# Patient Record
Sex: MALE | Race: WHITE | NOT HISPANIC OR LATINO | URBAN - METROPOLITAN AREA
[De-identification: names, ages, dates, MRNs, and addresses within clinical notes are randomized per-mention and may not be internally consistent; named-entity substitution may affect disease eponyms.]

---

## 2014-09-10 RX ORDER — DOCUSATE SODIUM 100 MG
2 CAPSULE ORAL
Qty: 30 | Refills: 0 | DISCHARGE
Start: 2014-09-10

## 2017-03-10 ENCOUNTER — EMERGENCY (EMERGENCY)
Facility: HOSPITAL | Age: 27
LOS: 1 days | Discharge: HOME | End: 2017-03-10
Attending: EMERGENCY MEDICINE | Admitting: EMERGENCY MEDICINE
Payer: MEDICAID

## 2017-03-10 VITALS — HEART RATE: 89 BPM | OXYGEN SATURATION: 99 %

## 2017-03-10 VITALS
HEART RATE: 103 BPM | OXYGEN SATURATION: 100 % | DIASTOLIC BLOOD PRESSURE: 48 MMHG | SYSTOLIC BLOOD PRESSURE: 148 MMHG | RESPIRATION RATE: 15 BRPM | TEMPERATURE: 98 F

## 2017-03-10 PROCEDURE — 12013 RPR F/E/E/N/L/M 2.6-5.0 CM: CPT

## 2017-03-10 PROCEDURE — 99283 EMERGENCY DEPT VISIT LOW MDM: CPT | Mod: 25

## 2017-03-10 RX ORDER — TETANUS TOXOID, REDUCED DIPHTHERIA TOXOID AND ACELLULAR PERTUSSIS VACCINE, ADSORBED 5; 2.5; 8; 8; 2.5 [IU]/.5ML; [IU]/.5ML; UG/.5ML; UG/.5ML; UG/.5ML
0.5 SUSPENSION INTRAMUSCULAR ONCE
Qty: 0 | Refills: 0 | Status: COMPLETED | OUTPATIENT
Start: 2017-03-10 | End: 2017-03-10

## 2017-03-10 RX ORDER — BENZOIN RESIN 1000 MG/ML
1 LIQUID TOPICAL ONCE
Qty: 0 | Refills: 0 | Status: DISCONTINUED | OUTPATIENT
Start: 2017-03-10 | End: 2017-03-10

## 2017-03-10 RX ADMIN — TETANUS TOXOID, REDUCED DIPHTHERIA TOXOID AND ACELLULAR PERTUSSIS VACCINE, ADSORBED 0.5 MILLILITER(S): 5; 2.5; 8; 8; 2.5 SUSPENSION INTRAMUSCULAR at 09:27

## 2017-03-10 NOTE — ED PROVIDER NOTE - PMH
Acne    Acne    Autism    Bradycardia    Constipated    Constipation    Dermatitis  with hx atopic dermatitis  Enuresis    GERD (gastroesophageal reflux disease)    Hyperlipemia    Hypothyroid    Hypothyroid    Lead poisoning    Lead poisoning    Low HDL (under 40)    Lyme disease    Lyme disease    MR (mental retardation)    Pervasive developmental disorder, active  with self injurous behavior  Pneumonia    Proctitis    Sepsis    Sinus bradycardia

## 2017-03-10 NOTE — ED PROCEDURE NOTE - CPROC ED POST PROC CARE GUIDE1
Instructed patient/caregiver regarding signs and symptoms of infection./Keep the cast/splint/dressing clean and dry./Verbal/written post procedure instructions were given to patient/caregiver.
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
## 2017-03-10 NOTE — ED ADULT TRIAGE NOTE - CHIEF COMPLAINT QUOTE
awake from [place] has hx of  MR autism and head banging  was banging head today has small abrasion to back of head and laceration to chin   baseline mental status at present

## 2017-03-10 NOTE — ED PROVIDER NOTE - OBJECTIVE STATEMENT
28 y/o M pt with PMHx of MR [facility] aides from group home to the ED for laceration on chin since today. Aide states that pt was acting out today and had "head banging episode". Pt is currently at baseline state as per aides. Denies LOC as per aide. 26 y/o M pt with PMHx of MR [facility] aides from group home to the ED for laceration on chin since today. Aide states that pt was acting out today and had "head banging episode" - consistent w/ prior agitation episodes. Pt is currently at baseline state as per aides. Denies LOC as per aide.

## 2017-03-10 NOTE — ED PROVIDER NOTE - NS ED MD SCRIBE ATTENDING SCRIBE SECTIONS
REVIEW OF SYSTEMS/DISPOSITION/HIV/PAST MEDICAL/SURGICAL/SOCIAL HISTORY/VITAL SIGNS( Pullset)/HISTORY OF PRESENT ILLNESS

## 2017-03-10 NOTE — ED PROVIDER NOTE - PROGRESS NOTE DETAILS
The scribe's documentation has been prepared under my direction and personally reviewed by me in its entirety. I confirm that the note above accurately reflects all work, treatment, procedures, and medical decicion-making performed by me.  Hansel Rogel MD

## 2017-03-13 ENCOUNTER — EMERGENCY (EMERGENCY)
Facility: HOSPITAL | Age: 27
LOS: 1 days | Discharge: HOME | End: 2017-03-13
Attending: EMERGENCY MEDICINE | Admitting: EMERGENCY MEDICINE
Payer: MEDICAID

## 2017-03-13 VITALS — HEART RATE: 89 BPM | RESPIRATION RATE: 16 BRPM | OXYGEN SATURATION: 100 %

## 2017-03-13 PROCEDURE — 99282 EMERGENCY DEPT VISIT SF MDM: CPT

## 2017-03-13 NOTE — ED PROVIDER NOTE - OBJECTIVE STATEMENT
26yo M with PMHX of acne, autism, constipation, dermatitis, enuresis, GERD, HLD, hypothyroid, lead poisoning, low HDL, lyme disease, MR, developmental disorder, PNA, proctitis, sepsis, sinus bradycardia, from group facility, presents to ED for wound check. Per Aids: pt had x5 vicryl absorbable sutures placed x3d ago at [facility] ED for chin lac. Per aids, no redness, no pus. Pt at baseline.

## 2017-03-13 NOTE — ED PROVIDER NOTE - NS ED MD SCRIBE ATTENDING SCRIBE SECTIONS
REVIEW OF SYSTEMS/HISTORY OF PRESENT ILLNESS/PHYSICAL EXAM/HIV/PAST MEDICAL/SURGICAL/SOCIAL HISTORY/DISPOSITION/VITAL SIGNS( Pullset)

## 2017-05-17 ENCOUNTER — EMERGENCY (EMERGENCY)
Facility: HOSPITAL | Age: 27
LOS: 0 days | Discharge: HOME | End: 2017-05-17
Attending: EMERGENCY MEDICINE
Payer: MEDICAID

## 2017-05-17 VITALS
HEART RATE: 100 BPM | WEIGHT: 139.99 LBS | DIASTOLIC BLOOD PRESSURE: 97 MMHG | TEMPERATURE: 98 F | OXYGEN SATURATION: 100 % | SYSTOLIC BLOOD PRESSURE: 126 MMHG | HEIGHT: 65 IN | RESPIRATION RATE: 16 BRPM
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
## 2017-05-17 DIAGNOSIS — E73.9 LACTOSE INTOLERANCE, UNSPECIFIED: ICD-10-CM

## 2017-05-17 DIAGNOSIS — Z23 ENCOUNTER FOR IMMUNIZATION: ICD-10-CM

## 2017-05-17 DIAGNOSIS — S91.311A LACERATION WITHOUT FOREIGN BODY, RIGHT FOOT, INITIAL ENCOUNTER: ICD-10-CM
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
## 2017-05-17 DIAGNOSIS — M79.671 PAIN IN RIGHT FOOT: ICD-10-CM

## 2017-05-17 PROCEDURE — 99283 EMERGENCY DEPT VISIT LOW MDM: CPT

## 2017-05-17 RX ORDER — TETANUS TOXOID, REDUCED DIPHTHERIA TOXOID AND ACELLULAR PERTUSSIS VACCINE, ADSORBED 5; 2.5; 8; 8; 2.5 [IU]/.5ML; [IU]/.5ML; UG/.5ML; UG/.5ML; UG/.5ML
0.5 SUSPENSION INTRAMUSCULAR ONCE
Qty: 0 | Refills: 0 | Status: COMPLETED | OUTPATIENT
Start: 2017-05-17 | End: 2017-05-17

## 2017-05-17 RX ADMIN — TETANUS TOXOID, REDUCED DIPHTHERIA TOXOID AND ACELLULAR PERTUSSIS VACCINE, ADSORBED 0.5 MILLILITER(S): 5; 2.5; 8; 8; 2.5 SUSPENSION INTRAMUSCULAR at 19:03
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
## 2017-08-22 ENCOUNTER — EMERGENCY (EMERGENCY)
Facility: HOSPITAL | Age: 27
LOS: 1 days | Discharge: HOME | End: 2017-08-22
Admitting: EMERGENCY MEDICINE
Payer: COMMERCIAL

## 2017-08-22 VITALS
TEMPERATURE: 98 F | DIASTOLIC BLOOD PRESSURE: 75 MMHG | SYSTOLIC BLOOD PRESSURE: 125 MMHG | OXYGEN SATURATION: 96 % | HEART RATE: 57 BPM | RESPIRATION RATE: 16 BRPM

## 2017-08-22 PROCEDURE — 99283 EMERGENCY DEPT VISIT LOW MDM: CPT
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
## 2017-08-22 NOTE — ED PROVIDER NOTE - NS_EDPROVIDERDISPOUSERTYPE_ED_A_ED
Scribe Attestation (For Scribes USE Only)... Scribe Attestation (For Scribes USE Only).../I have personally evaluated and examined the patient. The Attending was available to me as a supervising provider if needed.

## 2018-01-07 ENCOUNTER — EMERGENCY (EMERGENCY)
Facility: HOSPITAL | Age: 28
LOS: 1 days | Discharge: HOME | End: 2018-01-07
Attending: EMERGENCY MEDICINE | Admitting: EMERGENCY MEDICINE
Payer: MEDICAID

## 2018-01-07 VITALS
RESPIRATION RATE: 17 BRPM | DIASTOLIC BLOOD PRESSURE: 86 MMHG | HEART RATE: 61 BPM | SYSTOLIC BLOOD PRESSURE: 128 MMHG | OXYGEN SATURATION: 97 % | TEMPERATURE: 98 F

## 2018-01-07 PROCEDURE — 71046 X-RAY EXAM CHEST 2 VIEWS: CPT | Mod: 26

## 2018-01-07 PROCEDURE — 99283 EMERGENCY DEPT VISIT LOW MDM: CPT

## 2018-01-07 NOTE — ED PROVIDER NOTE - OBJECTIVE STATEMENT
Pt is a 28 y/o M w/ autism, who presents to the ED with cough, and rhinorrhea since yesterday. He lives at a group home and as precaution he was brought in today by care takers. They deny any fever, or changes in behavior.

## 2018-01-07 NOTE — ED PROVIDER NOTE - CARE PLAN
Principal Discharge DX:	Cough Principal Discharge DX:	Cough  Instructions for follow-up, activity and diet:	See your primary care doctor within 24-48 hours, bring copies of all reports with you. Take Tylenol as prescribed for fever. Return to the ER for worsening symptoms or any other concerns.

## 2018-01-07 NOTE — ED PROVIDER NOTE - PROGRESS NOTE DETAILS
KAT Prieto: Received sign out from KAT RAE to reassess patient and follow up on CXR results. CXR wnl, will dc back to group home.

## 2018-01-07 NOTE — ED PROVIDER NOTE - PLAN OF CARE
See your primary care doctor within 24-48 hours, bring copies of all reports with you. Take Tylenol as prescribed for fever. Return to the ER for worsening symptoms or any other concerns.

## 2018-02-20 ENCOUNTER — EMERGENCY (EMERGENCY)
Facility: HOSPITAL | Age: 28
LOS: 1 days | Discharge: HOME | End: 2018-02-20
Attending: EMERGENCY MEDICINE | Admitting: EMERGENCY MEDICINE
Payer: MEDICAID

## 2018-02-20 ENCOUNTER — TRANSCRIPTION ENCOUNTER (OUTPATIENT)
Age: 28
End: 2018-02-20

## 2018-02-20 VITALS
TEMPERATURE: 99 F | DIASTOLIC BLOOD PRESSURE: 82 MMHG | RESPIRATION RATE: 16 BRPM | SYSTOLIC BLOOD PRESSURE: 137 MMHG | HEART RATE: 89 BPM | OXYGEN SATURATION: 99 %

## 2018-02-20 PROCEDURE — 99283 EMERGENCY DEPT VISIT LOW MDM: CPT
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
## 2018-02-20 NOTE — ED PROVIDER NOTE - OBJECTIVE STATEMENT
28 yo male bib aides, 28 yo male bib aides, had fever 101 earlier today, but now afebrile, no complaints, wanted rapid flu per group home.  denies nvd, no cough no abd pain cp sob

## 2018-02-20 NOTE — ED ADULT TRIAGE NOTE - CHIEF COMPLAINT QUOTE
pt with fever this morning 101 at group home, did not eat his breakfast otherwise acting his normal self as per group home staff.  Pt went to Urgent Care and was sent to ED because he could not give urine sample.  Pt stating that he had to urinate during triage, given cup.  PMH: autism, lead poisoning, MR, bradycardia

## 2018-02-20 NOTE — ED PROVIDER NOTE - MEDICAL DECISION MAKING DETAILS
spoke w dr hess, pmd, has appt this afternoon, discussed unremarkable exam, she accepts pt for workup in her office, as has rapid flu there, and return to ER if worse

## 2018-05-11 ENCOUNTER — EMERGENCY (EMERGENCY)
Facility: HOSPITAL | Age: 28
LOS: 1 days | Discharge: HOME | End: 2018-05-11
Attending: EMERGENCY MEDICINE | Admitting: EMERGENCY MEDICINE
Payer: MEDICAID

## 2018-05-11 VITALS
OXYGEN SATURATION: 97 % | SYSTOLIC BLOOD PRESSURE: 138 MMHG | HEART RATE: 82 BPM | TEMPERATURE: 98 F | DIASTOLIC BLOOD PRESSURE: 74 MMHG | RESPIRATION RATE: 18 BRPM

## 2018-05-11 PROCEDURE — 12011 RPR F/E/E/N/L/M 2.5 CM/<: CPT

## 2018-05-11 PROCEDURE — 99282 EMERGENCY DEPT VISIT SF MDM: CPT | Mod: 25

## 2018-05-11 NOTE — ED PROVIDER NOTE - PHYSICAL EXAMINATION
ATTENDING PHYSICAL EXAM DR. WASHINGTON ***GEN - NAD; well appearing; A+O x3 ***HEAD - NC/AT ***EYES/NOSE - PERRL, EOMI, mucous membranes moist, no discharge ***THROAT: Oral cavity and pharynx normal. No inflammation, swelling, exudate, or lesions.  ***NECK: Neck supple, non-tender without lymphadenopathy, no masses, no thyromegaly.   ***PULMONARY - CTA b/l, symmetric breath sounds. ***CARDIAC -s1s2, RRR, no M,G,R  ***ABDOMEN - +BS, ND, NT, soft, no guarding, no rebound, no masses   ***BACK - no CVA tenderness, Normal  spine ***EXTREMITIES - symmetric pulses, 2+ dp, capillary refill < 2 seconds, no clubbing, no cyanosis, no edema ***SKIN - no rash or bruising   ***NEUROLOGIC - alert, CN 2-12 intact, reflexes nl, sensation nl, coordination nl, finger to nose nl, moving all 4 ext, no pronator drift, gait nl

## 2018-05-11 NOTE — ED ADULT TRIAGE NOTE - CHIEF COMPLAINT QUOTE
Pt brought from Group home, accompanied by staff for slip & fall in shower. + head injury, apprx 1 cm lac observed to R forehead with minimal bleeding. No LOC. Hx: MR, Autism. Pt cooperative in triage, follows commands. No other complaints at this time.
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
## 2018-06-20 ENCOUNTER — OUTPATIENT (OUTPATIENT)
Dept: OUTPATIENT SERVICES | Facility: HOSPITAL | Age: 28
LOS: 1 days | End: 2018-06-20
Payer: MEDICAID

## 2018-06-20 VITALS
SYSTOLIC BLOOD PRESSURE: 114 MMHG | RESPIRATION RATE: 16 BRPM | DIASTOLIC BLOOD PRESSURE: 76 MMHG | TEMPERATURE: 97 F | WEIGHT: 164.91 LBS | OXYGEN SATURATION: 96 % | HEIGHT: 64.5 IN | HEART RATE: 71 BPM

## 2018-06-20 DIAGNOSIS — K05.6 PERIODONTAL DISEASE, UNSPECIFIED: ICD-10-CM

## 2018-06-20 DIAGNOSIS — K02.62 DENTAL CARIES ON SMOOTH SURFACE PENETRATING INTO DENTIN: ICD-10-CM

## 2018-06-20 DIAGNOSIS — Z01.818 ENCOUNTER FOR OTHER PREPROCEDURAL EXAMINATION: ICD-10-CM

## 2018-06-20 DIAGNOSIS — Z01.20 ENCOUNTER FOR DENTAL EXAMINATION AND CLEANING WITHOUT ABNORMAL FINDINGS: ICD-10-CM

## 2018-06-20 DIAGNOSIS — G40.909 EPILEPSY, UNSPECIFIED, NOT INTRACTABLE, WITHOUT STATUS EPILEPTICUS: ICD-10-CM

## 2018-06-20 LAB
ANION GAP SERPL CALC-SCNC: 14 MMOL/L — SIGNIFICANT CHANGE UP (ref 5–17)
BUN SERPL-MCNC: 13 MG/DL — SIGNIFICANT CHANGE UP (ref 7–23)
CALCIUM SERPL-MCNC: 9 MG/DL — SIGNIFICANT CHANGE UP (ref 8.4–10.5)
CHLORIDE SERPL-SCNC: 101 MMOL/L — SIGNIFICANT CHANGE UP (ref 96–108)
CO2 SERPL-SCNC: 24 MMOL/L — SIGNIFICANT CHANGE UP (ref 22–31)
CREAT SERPL-MCNC: 0.76 MG/DL — SIGNIFICANT CHANGE UP (ref 0.5–1.3)
GLUCOSE SERPL-MCNC: 90 MG/DL — SIGNIFICANT CHANGE UP (ref 70–99)
HCT VFR BLD CALC: 41.4 % — SIGNIFICANT CHANGE UP (ref 39–50)
HGB BLD-MCNC: 14.4 G/DL — SIGNIFICANT CHANGE UP (ref 13–17)
MCHC RBC-ENTMCNC: 31.6 PG — SIGNIFICANT CHANGE UP (ref 27–34)
MCHC RBC-ENTMCNC: 34.8 GM/DL — SIGNIFICANT CHANGE UP (ref 32–36)
MCV RBC AUTO: 91 FL — SIGNIFICANT CHANGE UP (ref 80–100)
PLATELET # BLD AUTO: 221 K/UL — SIGNIFICANT CHANGE UP (ref 150–400)
POTASSIUM SERPL-MCNC: 4.2 MMOL/L — SIGNIFICANT CHANGE UP (ref 3.5–5.3)
POTASSIUM SERPL-SCNC: 4.2 MMOL/L — SIGNIFICANT CHANGE UP (ref 3.5–5.3)
RBC # BLD: 4.55 M/UL — SIGNIFICANT CHANGE UP (ref 4.2–5.8)
RBC # FLD: 12.9 % — SIGNIFICANT CHANGE UP (ref 10.3–14.5)
SODIUM SERPL-SCNC: 139 MMOL/L — SIGNIFICANT CHANGE UP (ref 135–145)
WBC # BLD: 8.69 K/UL — SIGNIFICANT CHANGE UP (ref 3.8–10.5)
WBC # FLD AUTO: 8.69 K/UL — SIGNIFICANT CHANGE UP (ref 3.8–10.5)

## 2018-06-20 PROCEDURE — 85027 COMPLETE CBC AUTOMATED: CPT

## 2018-06-20 PROCEDURE — 80048 BASIC METABOLIC PNL TOTAL CA: CPT

## 2018-06-20 PROCEDURE — G0463: CPT

## 2018-06-20 RX ORDER — OXCARBAZEPINE 300 MG/1
1 TABLET, FILM COATED ORAL
Qty: 0 | Refills: 0 | COMMUNITY
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
## 2018-06-20 NOTE — H&P PST ADULT - PMH
Acne    Constipation    Dermatitis  with hx atopic dermatitis  Enuresis    Hypothyroid    Lead poisoning    Low HDL (under 40)    Lyme disease    MR (mental retardation)    Pervasive developmental disorder, active  with self injurous behavior  Proctitis    Sinus bradycardia Acne    Acne    Autism    Bradycardia    Constipated    Constipation    Dermatitis  with hx atopic dermatitis  Enuresis    GERD (gastroesophageal reflux disease)    Hyperlipemia    Hypothyroid    Hypothyroid    Lead poisoning    Lead poisoning    Low HDL (under 40)    Lyme disease    Lyme disease    Mental retardation  severe  Pervasive developmental disorder, active  with self injurous behavior  Pneumonia    Proctitis    Sepsis    Sinus bradycardia Autism    Constipation    Enuresis    GERD (gastroesophageal reflux disease)    Hyperlipemia    Hypothyroid    Lead poisoning    Low HDL (under 40)    Lyme disease    Mental retardation  severe  Pervasive developmental disorder, active  with self injurous behavior  Seizure disorder

## 2018-07-10 ENCOUNTER — TRANSCRIPTION ENCOUNTER (OUTPATIENT)
Age: 28
End: 2018-07-10

## 2018-07-11 ENCOUNTER — OUTPATIENT (OUTPATIENT)
Dept: OUTPATIENT SERVICES | Facility: HOSPITAL | Age: 28
LOS: 1 days | End: 2018-07-11
Payer: MEDICAID

## 2018-07-11 VITALS
WEIGHT: 164.91 LBS | RESPIRATION RATE: 18 BRPM | HEART RATE: 73 BPM | TEMPERATURE: 98 F | HEIGHT: 64.5 IN | OXYGEN SATURATION: 97 % | SYSTOLIC BLOOD PRESSURE: 125 MMHG | DIASTOLIC BLOOD PRESSURE: 78 MMHG

## 2018-07-11 VITALS
RESPIRATION RATE: 24 BRPM | SYSTOLIC BLOOD PRESSURE: 126 MMHG | DIASTOLIC BLOOD PRESSURE: 76 MMHG | HEART RATE: 87 BPM | TEMPERATURE: 98 F | OXYGEN SATURATION: 97 %

## 2018-07-11 DIAGNOSIS — K05.6 PERIODONTAL DISEASE, UNSPECIFIED: ICD-10-CM

## 2018-07-11 DIAGNOSIS — Z01.818 ENCOUNTER FOR OTHER PREPROCEDURAL EXAMINATION: ICD-10-CM

## 2018-07-11 DIAGNOSIS — K02.62 DENTAL CARIES ON SMOOTH SURFACE PENETRATING INTO DENTIN: ICD-10-CM

## 2018-07-11 PROCEDURE — D2161: CPT

## 2018-07-11 PROCEDURE — D7140: CPT

## 2018-07-11 PROCEDURE — C1889: CPT

## 2018-07-11 PROCEDURE — D2140: CPT

## 2018-07-11 PROCEDURE — D2391: CPT

## 2018-07-11 RX ORDER — SODIUM CHLORIDE 9 MG/ML
1000 INJECTION, SOLUTION INTRAVENOUS
Qty: 0 | Refills: 0 | Status: DISCONTINUED | OUTPATIENT
Start: 2018-07-11 | End: 2018-07-26

## 2018-07-11 RX ORDER — ACETAMINOPHEN 500 MG
1000 TABLET ORAL ONCE
Qty: 0 | Refills: 0 | Status: DISCONTINUED | OUTPATIENT
Start: 2018-07-11 | End: 2018-07-26

## 2018-07-11 RX ORDER — ONDANSETRON 8 MG/1
4 TABLET, FILM COATED ORAL ONCE
Qty: 0 | Refills: 0 | Status: DISCONTINUED | OUTPATIENT
Start: 2018-07-11 | End: 2018-07-26

## 2018-07-11 NOTE — ASU PATIENT PROFILE, ADULT - PMH
Autism    Constipation    Enuresis    GERD (gastroesophageal reflux disease)    Hyperlipemia    Hypothyroid    Lead poisoning    Low HDL (under 40)    Lyme disease    Mental retardation  severe  Pervasive developmental disorder, active  with self injurous behavior  Seizure disorder

## 2018-07-11 NOTE — BRIEF OPERATIVE NOTE - PROCEDURE
<<-----Click on this checkbox to enter Procedure Dental exam, with x-ray imaging, dental cleaning, and restoration  07/11/2018    Active  MONICA

## 2018-07-11 NOTE — ASU DISCHARGE PLAN (ADULT/PEDIATRIC). - INSTRUCTIONS
post op instructions were given, one extraction performed to the upper left side, no straw or spitting for two days, see Dr Day in one week [id number], patient may return to program on 7/13/18

## 2018-07-17 ENCOUNTER — EMERGENCY (EMERGENCY)
Facility: HOSPITAL | Age: 28
LOS: 1 days | Discharge: HOME | End: 2018-07-17
Attending: EMERGENCY MEDICINE | Admitting: EMERGENCY MEDICINE
Payer: MEDICAID

## 2018-07-17 VITALS
TEMPERATURE: 98 F | OXYGEN SATURATION: 99 % | SYSTOLIC BLOOD PRESSURE: 133 MMHG | DIASTOLIC BLOOD PRESSURE: 92 MMHG | HEART RATE: 84 BPM | RESPIRATION RATE: 18 BRPM

## 2018-07-17 PROBLEM — F79 UNSPECIFIED INTELLECTUAL DISABILITIES: Chronic | Status: ACTIVE | Noted: 2018-06-20

## 2018-07-17 PROCEDURE — 70450 CT HEAD/BRAIN W/O DYE: CPT | Mod: 26

## 2018-07-17 PROCEDURE — 99284 EMERGENCY DEPT VISIT MOD MDM: CPT
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
## 2018-07-17 NOTE — ED PROVIDER NOTE - ENMT, MLM
Airway patent, Nasal mucosa clear. Mouth with normal mucosa. Throat has no vesicles, no oropharyngeal exudates and uvula is midline. No hemotympanum bilaterally. No bustos's sign. No raccoon eyes. Head soft tissue swelling over right temporal area. No deformity or step off. No apparent tenderness

## 2018-07-17 NOTE — ED PROVIDER NOTE - PSYCHIATRIC, MLM
Alert and oriented to person, place, time/situation. normal mood and affect. no apparent risk to self or others. Calm and cooperative

## 2018-07-17 NOTE — ED PROVIDER NOTE - MEDICAL DECISION MAKING DETAILS
Pt with soft tissue swelling over temporal area after minor head trauma. Perform CT head and reassess
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
## 2018-07-17 NOTE — ED PROVIDER NOTE - MUSCULOSKELETAL, MLM
Spine appears normal, range of motion is not limited, no muscle or joint tenderness. No deformity. Moving all extremities. Ambulatory with steady gait

## 2018-07-24 ENCOUNTER — EMERGENCY (EMERGENCY)
Facility: HOSPITAL | Age: 28
LOS: 0 days | Discharge: HOME | End: 2018-07-24
Attending: STUDENT IN AN ORGANIZED HEALTH CARE EDUCATION/TRAINING PROGRAM
Payer: MEDICAID

## 2018-07-24 VITALS
DIASTOLIC BLOOD PRESSURE: 83 MMHG | HEART RATE: 97 BPM | TEMPERATURE: 99 F | RESPIRATION RATE: 20 BRPM | SYSTOLIC BLOOD PRESSURE: 136 MMHG | OXYGEN SATURATION: 99 %

## 2018-07-24 DIAGNOSIS — M25.571 PAIN IN RIGHT ANKLE AND JOINTS OF RIGHT FOOT: ICD-10-CM
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
## 2018-07-24 DIAGNOSIS — G40.909 EPILEPSY, UNSPECIFIED, NOT INTRACTABLE, WITHOUT STATUS EPILEPTICUS: ICD-10-CM
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
## 2018-07-24 DIAGNOSIS — E78.5 HYPERLIPIDEMIA, UNSPECIFIED: ICD-10-CM

## 2018-07-24 DIAGNOSIS — F84.0 AUTISTIC DISORDER: ICD-10-CM

## 2018-07-24 DIAGNOSIS — K59.00 CONSTIPATION, UNSPECIFIED: ICD-10-CM
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
## 2018-07-24 DIAGNOSIS — F79 UNSPECIFIED INTELLECTUAL DISABILITIES: ICD-10-CM

## 2018-07-24 DIAGNOSIS — F84.9 PERVASIVE DEVELOPMENTAL DISORDER, UNSPECIFIED: ICD-10-CM

## 2018-07-24 PROCEDURE — 99283 EMERGENCY DEPT VISIT LOW MDM: CPT | Mod: 25

## 2018-07-24 PROCEDURE — 29515 APPLICATION SHORT LEG SPLINT: CPT

## 2018-07-24 PROCEDURE — 73610 X-RAY EXAM OF ANKLE: CPT | Mod: 26,RT
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
## 2018-07-24 NOTE — ED PROVIDER NOTE - MEDICAL DECISION MAKING DETAILS
rest, ice, otc pain meds and orthopaedic f/u in 1 week   Patient discharge to 2 staff members at group home

## 2018-07-24 NOTE — ED ADULT TRIAGE NOTE - CHIEF COMPLAINT QUOTE
pt complaining of right ankle pain and swelling. as per group home rep pt fell this morning.  denies head injury. history of mental retardation.

## 2018-07-24 NOTE — ED PROVIDER NOTE - ATTENDING CONTRIBUTION TO CARE
I have personally seen and evaluated this patient and agree with the PA note, exam and plan of care    28 year old male presented today from a group home accompanied with a staff member for evaluation for ankle swelling and tenderness s/p fall, on exam pt has lateral ankle soft tissue swelling with decreased rom due to the pain, xray is neg for acute fracture, ace wrap applied, home care instructions provided, referred to ortho

## 2018-08-01 ENCOUNTER — EMERGENCY (EMERGENCY)
Facility: HOSPITAL | Age: 28
LOS: 1 days | Discharge: NURSING, REHAB OR LONG-TERM CARE | End: 2018-08-01
Attending: EMERGENCY MEDICINE | Admitting: EMERGENCY MEDICINE
Payer: MEDICAID

## 2018-08-01 ENCOUNTER — APPOINTMENT (OUTPATIENT)
Dept: ORTHOPEDIC SURGERY | Facility: HOSPITAL | Age: 28
End: 2018-08-01

## 2018-08-01 VITALS
DIASTOLIC BLOOD PRESSURE: 93 MMHG | OXYGEN SATURATION: 99 % | SYSTOLIC BLOOD PRESSURE: 132 MMHG | TEMPERATURE: 98 F | RESPIRATION RATE: 18 BRPM | HEART RATE: 88 BPM

## 2018-08-01 PROCEDURE — 99282 EMERGENCY DEPT VISIT SF MDM: CPT

## 2018-08-01 NOTE — ED PROVIDER NOTE - PHYSICAL EXAMINATION
MSK: full active ROM of LE and ankle, pt able to ambulate independently, some bruising of L lateral ankle

## 2018-08-01 NOTE — ED PROVIDER NOTE - OBJECTIVE STATEMENT
27 YO M presents from group home for f/u of foot injury. Three aides bedside contribute to HPI. Pt seen a week ago in [facility] ED and given a posterior splint and informed to f/u with ortho. Patient saw ortho. Per aides they state they were informed to f/u in the ED again in a week. Pt denies any acute complaints and pain. 27 YO M presents from group home for f/u of foot injury. Three aides bedside contribute to HPI. Pt seen a week ago in ED and given a posterior splint and informed to f/u with ortho. Patient saw ortho. Per aides they state they were informed to f/u in the ED again in a week. Pt denies any acute complaints and pain.

## 2018-08-01 NOTE — ED PROVIDER NOTE - ATTENDING CONTRIBUTION TO CARE
Laz: 29 yo male with a h/o MR presents to the ED for f/u of right ankle sprain. Pt already seen as an outpatient by ortho. Since then pt has been ambulatory and not complaining of pain. Staff confused as to why they came back tot he ED- likely meant to f/u with outpatient ortho. GENERAL: well appearing, NAD, HEENT: MMM, CARDIO: +S1/S2, no murmurs, rubs or gallops, LUNGS: CTA B/L, no wheezing, rales or rhonchi,  EXT: mild ecchymosis of lateral aspect of right ankle, no bony TTP, pt ambulatory.,, 2+ distal pulses x 4 extremities. NEURO: follows commands and answers basic questions SKIN: ecchymosis as noted above. A/P- 29 yo male with ankle sprain, healing well. xrays reviewed, no signs of fracture and patient clinically improving. will d/c to f/u with ortho.

## 2018-08-01 NOTE — ED PROVIDER NOTE - MEDICAL DECISION MAKING DETAILS
29 YO M presents for f/u of ankle sprain. Pt able to ambulate independently, does not appear in acute distress, and denies pain. Will inform to continue ortho f/u.

## 2018-08-05 ENCOUNTER — EMERGENCY (EMERGENCY)
Facility: HOSPITAL | Age: 28
LOS: 0 days | Discharge: HOME | End: 2018-08-05
Attending: EMERGENCY MEDICINE
Payer: MEDICAID

## 2018-08-05 VITALS
HEIGHT: 64 IN | SYSTOLIC BLOOD PRESSURE: 135 MMHG | DIASTOLIC BLOOD PRESSURE: 92 MMHG | RESPIRATION RATE: 17 BRPM | OXYGEN SATURATION: 100 % | HEART RATE: 83 BPM | TEMPERATURE: 98 F | WEIGHT: 139.99 LBS

## 2018-08-05 DIAGNOSIS — Z04.1 ENCOUNTER FOR EXAMINATION AND OBSERVATION FOLLOWING TRANSPORT ACCIDENT: ICD-10-CM

## 2018-08-05 DIAGNOSIS — F79 UNSPECIFIED INTELLECTUAL DISABILITIES: ICD-10-CM

## 2018-08-05 DIAGNOSIS — K59.00 CONSTIPATION, UNSPECIFIED: ICD-10-CM

## 2018-08-05 DIAGNOSIS — E78.5 HYPERLIPIDEMIA, UNSPECIFIED: ICD-10-CM

## 2018-08-05 DIAGNOSIS — F84.0 AUTISTIC DISORDER: ICD-10-CM

## 2018-08-05 DIAGNOSIS — A69.20 LYME DISEASE, UNSPECIFIED: ICD-10-CM

## 2018-08-05 DIAGNOSIS — E03.9 HYPOTHYROIDISM, UNSPECIFIED: ICD-10-CM

## 2018-08-05 PROCEDURE — 99283 EMERGENCY DEPT VISIT LOW MDM: CPT
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
## 2018-08-05 NOTE — ED ADULT NURSE NOTE - NSIMPLEMENTINTERV_GEN_ALL_ED
Implemented All Universal Safety Interventions:  [place] to call system. Call bell, personal items and telephone within reach. Instruct patient to call for assistance. Room bathroom lighting operational. Non-slip footwear when patient is off stretcher. Physically safe environment: no spills, clutter or unnecessary equipment. Stretcher in lowest position, wheels locked, appropriate side rails in place.
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
## 2018-08-08 ENCOUNTER — EMERGENCY (EMERGENCY)
Facility: HOSPITAL | Age: 28
LOS: 1 days | Discharge: HOME | End: 2018-08-08
Admitting: EMERGENCY MEDICINE
Payer: MEDICAID

## 2018-08-08 VITALS
HEART RATE: 60 BPM | SYSTOLIC BLOOD PRESSURE: 136 MMHG | OXYGEN SATURATION: 99 % | DIASTOLIC BLOOD PRESSURE: 54 MMHG | TEMPERATURE: 98 F | RESPIRATION RATE: 16 BRPM

## 2018-08-08 PROCEDURE — 99282 EMERGENCY DEPT VISIT SF MDM: CPT

## 2018-08-08 RX ORDER — TETANUS TOXOID, REDUCED DIPHTHERIA TOXOID AND ACELLULAR PERTUSSIS VACCINE, ADSORBED 5; 2.5; 8; 8; 2.5 [IU]/.5ML; [IU]/.5ML; UG/.5ML; UG/.5ML; UG/.5ML
0.5 SUSPENSION INTRAMUSCULAR ONCE
Qty: 0 | Refills: 0 | Status: COMPLETED | OUTPATIENT
Start: 2018-08-08 | End: 2018-08-08

## 2018-08-08 RX ADMIN — TETANUS TOXOID, REDUCED DIPHTHERIA TOXOID AND ACELLULAR PERTUSSIS VACCINE, ADSORBED 0.5 MILLILITER(S): 5; 2.5; 8; 8; 2.5 SUSPENSION INTRAMUSCULAR at 15:01

## 2018-08-08 NOTE — ED PROVIDER NOTE - PROGRESS NOTE DETAILS
RENETTA Roberts- cleaned wound with alcohol. Applied bacitracin and covered with band-aid. Wound care discussed- clean with soap and water daily, apply bacitracin and cover for 1 week- written on group home paper work.  Tdap administered. Head precautions/ strict return to ED discussed with patient and staff.

## 2018-08-08 NOTE — ED PROVIDER NOTE - PLAN OF CARE
Follow up with your PMD within 24-48 hrs hours.  Rest, Take Tylenol 650mg every 4-6 hours as needed for pain . You may have a headache associated with nausea and lightheadedness in the next few hours/days. This is called a concussion and does not warrant return to the Emergency department unless you develop significant worsening of pain, profuse vomiting, dizziness, changes in vision, difficulty walking/speaking, weakness or numbness to your extremities. Worsening or new concerning symptoms return to the emergency department. Follow up with your PMD within 24-48 hrs hours.  Rest, Take Tylenol 650mg every 4-6 hours as needed for pain . You may have a headache associated with nausea and lightheadedness in the next few hours/days. This is called a concussion and does not warrant return to the Emergency department unless you develop significant worsening of pain, profuse vomiting, dizziness, changes in vision, difficulty walking/speaking, weakness or numbness to your extremities. Wound care as discussed. Worsening or new concerning symptoms return to the emergency department. Tdap (tetanus shot) administered today.
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
## 2018-08-08 NOTE — ED PROVIDER NOTE - CARE PLAN
Principal Discharge DX:	Traumatic injury of head, initial encounter  Assessment and plan of treatment:	Follow up with your PMD within 24-48 hrs hours.  Rest, Take Tylenol 650mg every 4-6 hours as needed for pain . You may have a headache associated with nausea and lightheadedness in the next few hours/days. This is called a concussion and does not warrant return to the Emergency department unless you develop significant worsening of pain, profuse vomiting, dizziness, changes in vision, difficulty walking/speaking, weakness or numbness to your extremities. Worsening or new concerning symptoms return to the emergency department.  Secondary Diagnosis:	Abrasion Principal Discharge DX:	Traumatic injury of head, initial encounter  Assessment and plan of treatment:	Follow up with your PMD within 24-48 hrs hours.  Rest, Take Tylenol 650mg every 4-6 hours as needed for pain . You may have a headache associated with nausea and lightheadedness in the next few hours/days. This is called a concussion and does not warrant return to the Emergency department unless you develop significant worsening of pain, profuse vomiting, dizziness, changes in vision, difficulty walking/speaking, weakness or numbness to your extremities. Wound care as discussed. Worsening or new concerning symptoms return to the emergency department. Tdap (tetanus shot) administered today.  Secondary Diagnosis:	Abrasion
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
## 2018-08-08 NOTE — ED PROVIDER NOTE - MEDICAL DECISION MAKING DETAILS
27 y/o m with h/o MR presenting with head injury and abrasion- no LOC, no present neuro deficits- wound care, up date Tdap, strict head return precautions discussed with patient and staff

## 2018-08-24 NOTE — ED PROVIDER NOTE - CPE EDP RESP NORM
Info to pt, advised to monitor blood pressure and contact the office with a follow up. Reminder sets   normal...

## 2018-09-08 PROBLEM — S92.023A: Status: ACTIVE | Noted: 2018-09-08

## 2018-09-12 ENCOUNTER — APPOINTMENT (OUTPATIENT)
Dept: ORTHOPEDIC SURGERY | Facility: HOSPITAL | Age: 28
End: 2018-09-12

## 2018-09-12 DIAGNOSIS — S92.023A: ICD-10-CM

## 2018-11-27 ENCOUNTER — TRANSCRIPTION ENCOUNTER (OUTPATIENT)
Age: 28
End: 2018-11-27

## 2019-01-01 ENCOUNTER — EMERGENCY (EMERGENCY)
Facility: HOSPITAL | Age: 29
LOS: 1 days | Discharge: HOME | End: 2019-01-01
Attending: EMERGENCY MEDICINE | Admitting: EMERGENCY MEDICINE
Payer: MEDICAID

## 2019-01-01 VITALS
DIASTOLIC BLOOD PRESSURE: 94 MMHG | RESPIRATION RATE: 17 BRPM | OXYGEN SATURATION: 96 % | SYSTOLIC BLOOD PRESSURE: 136 MMHG | TEMPERATURE: 98 F | HEART RATE: 104 BPM

## 2019-01-01 PROCEDURE — 99285 EMERGENCY DEPT VISIT HI MDM: CPT | Mod: 25

## 2019-01-01 PROCEDURE — 70450 CT HEAD/BRAIN W/O DYE: CPT | Mod: 26

## 2019-01-01 RX ORDER — CEPHALEXIN 500 MG
500 CAPSULE ORAL ONCE
Qty: 0 | Refills: 0 | Status: COMPLETED | OUTPATIENT
Start: 2019-01-01 | End: 2019-01-01

## 2019-01-01 RX ORDER — CEPHALEXIN 500 MG
1 CAPSULE ORAL
Qty: 14 | Refills: 0
Start: 2019-01-01 | End: 2019-01-07

## 2019-01-01 RX ORDER — DIPHENHYDRAMINE HCL 50 MG
50 CAPSULE ORAL ONCE
Qty: 0 | Refills: 0 | Status: COMPLETED | OUTPATIENT
Start: 2019-01-01 | End: 2019-01-01

## 2019-01-01 RX ORDER — HALOPERIDOL DECANOATE 100 MG/ML
5 INJECTION INTRAMUSCULAR ONCE
Qty: 0 | Refills: 0 | Status: COMPLETED | OUTPATIENT
Start: 2019-01-01 | End: 2019-01-01

## 2019-01-01 RX ADMIN — HALOPERIDOL DECANOATE 5 MILLIGRAM(S): 100 INJECTION INTRAMUSCULAR at 06:36

## 2019-01-01 RX ADMIN — Medication 2 MILLIGRAM(S): at 06:36

## 2019-01-01 RX ADMIN — Medication 50 MILLIGRAM(S): at 06:38

## 2019-01-01 RX ADMIN — Medication 500 MILLIGRAM(S): at 07:59
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
## 2019-01-01 NOTE — ED ADULT NURSE NOTE - CHIEF COMPLAINT QUOTE
Patient arrives with 2 staff members from [facility] for injury to forehead after being aggressive towards staff as per staff members. Staff states he "knocks his head on different surfaces and injuries himself and that he has very volatile behavior." Noted to have dried blood on lips. Security called to [facility] patient becoming aggressive and staff holding patient, anm present. Patient escorted once calmed to stretcher and moved to  for safety. Unable to obtain v/s

## 2019-01-01 NOTE — ED ADULT TRIAGE NOTE - CHIEF COMPLAINT QUOTE
Patient arrives with 2 staff members from [facility] for injury to forehead after being aggressive towards staff as per staff members. Staff states he "knocks his head on different surfaces and injuries himself and that he has very volatile behavior." Noted to have dried blood on lips. Security called to [facility] patient becoming aggressive and staff holding patient, anm present. Patient escorted once calmed to stretcher and moved to  for safety. Unable to obtain v/s

## 2019-01-01 NOTE — ED PROCEDURE NOTE - PROCEDURE ADDITIONAL DETAILS
a bead was noted to be impacted in patient's forehead. it was removed with forceps. bleeding controlled, wound washed. hemostasis attained.

## 2019-01-01 NOTE — ED ADULT NURSE NOTE - CHPI ED NUR SYMPTOMS NEG
no nausea/no chills/no vomiting/no decreased eating/drinking/no dizziness/no fever/no weakness/no tingling

## 2019-01-01 NOTE — ED ADULT NURSE NOTE - INTERVENTIONS DEFINITIONS
Monitor gait and stability/Non-slip footwear when patient is off stretcher/Physically safe environment: no spills, clutter or unnecessary equipment/Stretcher in lowest position, wheels locked, appropriate side rails in place

## 2019-01-01 NOTE — ED PROVIDER NOTE - PROGRESS NOTE DETAILS
AJM: bead noted to be impacted in the middle of pts forehead on ct. bead was exracted and wound closed. will give keflex for infection ppx. no suture placed as hemostasis was reached and out of concern for possible infection/injury to patient. staff informed of decision. awaiting CT head read. AJM: ct head neg. dc

## 2019-01-01 NOTE — ED ADULT NURSE NOTE - ED STAT RN HANDOFF DETAILS
Pt discharged by provider with instructions.  Caregivers verbalizes understanding.  Pt returning to facility with caregivers.  Pt abx prescriptiuon sent electronically by provider.

## 2019-01-01 NOTE — ED ADULT NURSE REASSESSMENT NOTE - NS ED NURSE REASSESS COMMENT FT1
Pt escorted to CT with caregivers and RN.  Pt completed CT scan.  Attending removed plastic bead that had been "stuck" in pt forehead prior to visit.  Staff reports pt hits forehead against objects.  Bleeding stopped by guaze with pressure.  Fresh gauze placed with Bacitracin to forehead by RN as per MD.  Pt vitals as noted.  Caregivers remain at bedside.  Pt calm and cooperative.

## 2019-01-01 NOTE — ED ADULT NURSE NOTE - OBJECTIVE STATEMENT
Patient arrives with 2 staff members from [facility] for injury to forehead after being aggressive towards staff as per staff members. Staff states he "knocks his head on different surfaces and injuries himself and that he has very volatile behavior." Noted to have dried blood on lips.  Pt assessed by Attending Juan Carlos assessed pt.  Pt medicated as per EMAR.  Pt belongings checked and secured by staff.  Pt checked by metal detector.  Pt changed into gown and scrubs.  Pt awaiting CT scan.

## 2019-01-01 NOTE — ED PROVIDER NOTE - SKIN, MLM
Skin normal color for race, warm, dry and intact. No evidence of rash. + abrasion to middle of forehead, non-bleeding.

## 2019-01-01 NOTE — ED PROVIDER NOTE - NSFOLLOWUPINSTRUCTIONS_ED_ALL_ED_FT
Please take keflex as prescribed. Return to ER for worsening pain, aggression, fevers, chills, pus from wound.

## 2019-01-14 NOTE — PRE-ANESTHESIA EVALUATION ADULT - NSANTHSUBSTSD_GEN_ALL_CORE
No Skin Substitute Text: The defect edges were debeveled with a #15 scalpel blade.  Given the location of the defect, shape of the defect and the proximity to free margins a skin substitute graft was deemed most appropriate.  The graft material was trimmed to fit the size of the defect. The graft was then placed in the primary defect and oriented appropriately.

## 2019-02-20 ENCOUNTER — APPOINTMENT (OUTPATIENT)
Dept: ORTHOPEDIC SURGERY | Facility: HOSPITAL | Age: 29
End: 2019-02-20

## 2019-02-20 ENCOUNTER — OUTPATIENT (OUTPATIENT)
Dept: OUTPATIENT SERVICES | Facility: HOSPITAL | Age: 29
LOS: 1 days | End: 2019-02-20
Payer: MEDICAID

## 2019-02-20 VITALS
BODY MASS INDEX: 29.16 KG/M2 | SYSTOLIC BLOOD PRESSURE: 123 MMHG | HEIGHT: 65 IN | DIASTOLIC BLOOD PRESSURE: 65 MMHG | WEIGHT: 175 LBS | HEART RATE: 60 BPM

## 2019-02-20 DIAGNOSIS — M79.609 PAIN IN UNSPECIFIED LIMB: ICD-10-CM

## 2019-02-20 DIAGNOSIS — M24.573 CONTRACTURE, UNSPECIFIED ANKLE: ICD-10-CM

## 2019-02-20 PROCEDURE — G0463: CPT

## 2019-02-21 DIAGNOSIS — S93.401A SPRAIN OF UNSPECIFIED LIGAMENT OF RIGHT ANKLE, INITIAL ENCOUNTER: ICD-10-CM

## 2019-02-21 DIAGNOSIS — M24.573 CONTRACTURE, UNSPECIFIED ANKLE: ICD-10-CM

## 2019-03-27 NOTE — ED PROVIDER NOTE - LOWER EXTREMITY EXAM, RIGHT
April 8, 2019     Betina CASTELLANOS Anurag Odom  [address] [place] [address]  [place] [address]      Colonoscopy Instructions - Suprep with MAC sedation  Mert Hooks  [phone]    Date of Procedure: April 15, 2019  Arrive to register at: 7:45 am  Place: [facility]- 4th floor  [address], [address]  [place] [address]  [phone]  Please enter through the main doors near the [facility] and check in on the 4th floor.  Pre-Admit :  Open Access Scheduling Patient Line [phone]    Please read the entire instructions as soon as you receive them.    Patient Checklist  7 Days prior to your procedure  · If possible, try to avoid non-steroidal anti-inflammatory drugs (ibuprofen, Advil, Motrin, etc.).   · If you are on Plavix, please follow the instructions discussed at your clinic visit. If any questions call the office for more information.    · Stop taking iron supplements     5 Days prior to your procedure  · If you are on Coumadin, hold it as instructed at your visit. If any questions call the office for more information.  · Confirm you have a  to take you home following the procedure. You cannot take a taxi cab, bus, or public transport home alone.   · Obtain your Suprep from the pharmacy    2 Days before your procedure  · Avoid high fiber foods such as vegetables, popcorn, nuts etc.    1 Day before your procedure  · No solid food. No alcohol.   · Clear liquids ALL DAY. If you can see through it, you can drink it. Examples are clear broth or consommé, fruit juices without pulp (no orange or tomato), sports drinks (Gatorade, Propel etc.), Jell-o (no fruit added), coffee or tea (sweeteners are ok, no milk or cream), soda or non-alcoholic carbonated drinks, popsicles, water, and clear hard candies. Avoid red or purple colors.   · It is important to try and stay hydrated during the day by drinking fluids. A solution such as Gatorade or a  similar sports drink will help you to stay hydrated.   · At 3:00 pm take 20 mg Dulcolax (over the counter) with 8 oz. of water (either two 10 mg tablets or four 5 mg tablets).   · At 5:00 PM, pour one (1) 6-ounce bottle of Suprep liquid into the mixing container. Add cool drinking water to the 16-ounce line on the container and mix. Drink all the liquid in the container. Then drink two (2) more 16-ounce containers of water over the next 1 hour.      Day of the procedure  · No solid food. No alcohol-water only until 3 hours prior to your procedure.  You should not drink or take anything by mouth 3 hours prior to your procedure.  · At 7:45 AM (5 hrs prior to the procedure), pour one (1) 6-ounce bottle of Suprep liquid into the mixing container. Add cool drinking water to the 16-ounce line on the container and mix. Drink all the liquid in the container. Then drink two (2) more 16-ounce containers of water over the next 1 hour.  · You can take your medications with a sip of water up to 3 hours from your scheduled procedure, except for diabetic medications or blood thinners as previously instructed.      If you have any questions about the process, please call the office.     Special instructions  Diabetics - Contact your physician or my office for instructions on your diabetic medications, including insulin. In general, hold your diabetic medications the day of the procedure. Check your blood sugars frequently the day you are on a clear liquid diet.      Blood Thinners - Including dabigatran (Pradaxa), clopidogril (Plavix), warfarin (Coumadin), dipyridamole ( Persantine), rivaroxaban (Xarelto). Please contact my office if you are on these medications for instructions.     Kidney disease - If you have any problems with kidney disease. You should not take this prep. Please call my office, and we will prescribe a safer alternative    Frequently Asked Questions    What is a colonoscopy?   A colonoscopy is a procedure where  we can examine your large intestine for abnormalities. A flexible tube that has a camera and light at the tip and as thick as your finger is used in the exam.     How long is the colon?   The average length of the colon is 4 to 5 feet.     Why do I need to take the preparation and clear liquid diet?  The most important part in a successful exam is the preparation. It is important to clean your colon completely prior to the exam. If there is still remaining stool in the colon, it can prolong your procedure, and we may not be able to visualize the entire colon and lesions could be missed. If you have a substantial amount of stool remaining, the procedure may be terminated and a repeat or alternative prep may be required.    We have you take the Suprep the evening prior and morning of the procedure since studies have shown that taking the prep in 2 separate doses help improve the cleansing of the colon.     Is there an alternative to the Suprep?  In the past, most preps were performed with Fleets Phospho-Soda. However recent studies have shown the risk of causing permanent kidney damage/failure. Due to the risk of permanent kidney damage with the Fleet Phospho-Soda, most gastroenterologists have stopped using it. There is a prep taking pills; however this has the same medication as the Fleets Phospho-Soda, so the risk of kidney damage is still present.     There are alternatives to Suprep that may cost less, however it is 2 to 4 liters of total volume. If you would like the alternative, then contact my office.      If people have problems taking the prep at home, there is an alternative where you can take the entire preparation in the hospital the day of the procedure.     Can I take all my medications?  Most medications can be continued without problems. However please contact my office if you take diabetic medications, blood thinners such as Plavix, Coumadin, Warfarin, arthritis medications, non-steroidal  anti-inflammatory (ibuprofen, Motrin etc.), or iron supplements    What can I expect the day of the procedure?  You will arrive at the facility where you are scheduled. We have you arrive early since you will need to fill out your information. An IV will be placed so we can give you medications. Once in the procedure room, I will talk to you and answer any questions you may have. The anesthesiologist will talk to you before the procedure.     You will usually lie on your left side. I will then advance the scope to the end of your large intestine. During the procedure it is normal to feel some pressure, bloating or cramping. Careful examination will be performed throughout your colon. Then entire procedure takes approximately 30 minutes, however this can be prolonged in complicated cases.    Once the procedure is complete, you will be brought to the recovery room until you are stable to leave. You should plan on being at the procedure site for 2 to 3 hours.     What if something abnormal is found during the colonoscopy?  Most polyps can be removed at the time of the colonoscopy. Biopsies, tissue samples, can be obtained during the exam.     What are polyps?  Polyps are abnormal growth of colon tissue. A majority are benign or non-cancerous. All polyps that are removed are sent to the lab for analysis. Some polyps have the potential of transforming into cancer. This is why it is important to remove them while the are small and non-cancerous. Polyps can range from a couple of millimeters to a couple of inches.     How are the polyps removed?  The polyps can be removed by biopsy instruments. Some polyps are removed with a wire snare and cautery. Cautery produces an electrical current to help burn and remove the polyps. You should not feel any pain with removal of the polyps. The polyps are then retrieved and sent to a lab to be analyzed.     What will happen after the procedure?  I will discuss the findings with you. If  tissue samples/polyps are removed, I will give you a call in approximately 1 week with the results. If you do not hear from me in 2 weeks, you can call my office for results. Your family history, your personal history, and the number and type of polyps you have will determine when you may need a repeat exam.     Even if you feel alert after the procedure, your judgment and reflexes may be impaired the rest of the day. You should not drive, work machinery or perform any other task that requires your full attention.    You may have bloating and cramping after the exam. This usually is improved with passing of gas.    Normally you may resume a regular diet after the procedure is completed. If you are on a blood thinner, this may be held if a large polyp is removed.    Why do I need some one to accompany me to the exam?  Because you are given a sedative, you need someone you know to drive you home after the exam. If you are taking a bus, taxi or van service a responsible adult you know must accompany you. If this is a problem, the colonoscopy can be attempted without any sedation.     What are the complications of the exam?  Colonoscopies are relatively safe, however complications can occur. Some but not all of the risk are discussed below.  Some patients may have an adverse reaction with the sedation or complications from heart and lung disease. There is also the risk of causing bleeding and perforation (poking a hole in the colon). If these complications do occur, they may need surgical treatment. There are also a lot of large folds in the colon, so all of the walls of the colon cannot be visualized, and lesions can be missed.     After the procedure, if you have any abdominal pain, fever, chills, or rectal bleeding it is important to notify me or seek immediate medical attention.  It is important to know that bleeding can occur even several days after the procedure.     Information above was obtained and revised from  the American Society for Gastrointestinal Endoscopy web site.   Http://www.asge.org/PatientInfoIndex.aspx?fg=931. April 21st 2009.    MAC sedation/Anesthesia assistance    This is additional information about the sedation. You will be undergoing sedation under the assistance of the Anesthesia department. They will decide about what anesthesia would be in your best interest.    Due to the increase in potency of the medication that they use, this can increase the the risk of side effects compared to the regular conscious sedation. Any questions about the sedation should be directed to your anesthesiologist. You will meet them on the day of your procedure.    The Anesthesia Department has requested the following discontinuation of the following medications prior to your procedure:    • Phentermine - hold 10 days prior to your procedure  • Selegiline patches - hold 10 days prior to your procedure  • Sildenafil, Verdenafil, Tadalafil (only if taken for erectile dysfunction)- hold 48 hours prior to procedure  • Short acting insulin - hold the day of the procedure       normal/SWELLING

## 2019-05-15 ENCOUNTER — APPOINTMENT (OUTPATIENT)
Dept: ORTHOPEDIC SURGERY | Facility: HOSPITAL | Age: 29
End: 2019-05-15

## 2019-05-15 ENCOUNTER — OUTPATIENT (OUTPATIENT)
Dept: OUTPATIENT SERVICES | Facility: HOSPITAL | Age: 29
LOS: 1 days | End: 2019-05-15
Payer: MEDICAID

## 2019-05-15 VITALS
HEIGHT: 65 IN | DIASTOLIC BLOOD PRESSURE: 72 MMHG | WEIGHT: 172 LBS | SYSTOLIC BLOOD PRESSURE: 134 MMHG | BODY MASS INDEX: 28.66 KG/M2 | HEART RATE: 68 BPM

## 2019-05-15 DIAGNOSIS — M79.609 PAIN IN UNSPECIFIED LIMB: ICD-10-CM

## 2019-05-15 DIAGNOSIS — S93.401A SPRAIN OF UNSPECIFIED LIGAMENT OF RIGHT ANKLE, INITIAL ENCOUNTER: ICD-10-CM

## 2019-05-15 PROCEDURE — G0463: CPT

## 2019-07-07 ENCOUNTER — EMERGENCY (EMERGENCY)
Facility: HOSPITAL | Age: 29
LOS: 1 days | Discharge: HOME | End: 2019-07-07
Attending: EMERGENCY MEDICINE | Admitting: EMERGENCY MEDICINE
Payer: MEDICAID

## 2019-07-07 VITALS
HEART RATE: 92 BPM | RESPIRATION RATE: 16 BRPM | TEMPERATURE: 97 F | SYSTOLIC BLOOD PRESSURE: 144 MMHG | DIASTOLIC BLOOD PRESSURE: 97 MMHG | OXYGEN SATURATION: 98 %

## 2019-07-07 PROCEDURE — 99285 EMERGENCY DEPT VISIT HI MDM: CPT | Mod: 25

## 2019-07-07 PROCEDURE — 12013 RPR F/E/E/N/L/M 2.6-5.0 CM: CPT

## 2019-07-07 RX ORDER — DIPHENHYDRAMINE HCL 50 MG
50 CAPSULE ORAL ONCE
Refills: 0 | Status: COMPLETED | OUTPATIENT
Start: 2019-07-07 | End: 2019-07-07

## 2019-07-07 RX ORDER — CHLORPROMAZINE HCL 10 MG
25 TABLET ORAL ONCE
Refills: 0 | Status: COMPLETED | OUTPATIENT
Start: 2019-07-07 | End: 2019-07-07

## 2019-07-07 RX ORDER — HALOPERIDOL DECANOATE 100 MG/ML
5 INJECTION INTRAMUSCULAR ONCE
Refills: 0 | Status: DISCONTINUED | OUTPATIENT
Start: 2019-07-07 | End: 2019-07-07

## 2019-07-07 RX ORDER — HALOPERIDOL DECANOATE 100 MG/ML
5 INJECTION INTRAMUSCULAR ONCE
Refills: 0 | Status: COMPLETED | OUTPATIENT
Start: 2019-07-07 | End: 2019-07-07

## 2019-07-07 RX ADMIN — HALOPERIDOL DECANOATE 5 MILLIGRAM(S): 100 INJECTION INTRAMUSCULAR at 22:22

## 2019-07-07 RX ADMIN — Medication 2 MILLIGRAM(S): at 22:22

## 2019-07-07 RX ADMIN — Medication 25 MILLIGRAM(S): at 23:17

## 2019-07-07 RX ADMIN — Medication 50 MILLIGRAM(S): at 22:22

## 2019-07-07 NOTE — ED PROVIDER NOTE - PHYSICAL EXAMINATION
On exam, HDS, NAD, awake, alert, interactive, chin with 4cm laceration through the skin and muscle, no other signs of new trauma on the head, cspine NTTP in midline, + FROM, lungs CTAB, heart sounds normal, chest wall NTTP, abd benign, ext without deformity, edema, or TTP, + FROM On exam, HDS, NAD, awake, alert, interactive, chin with 4cm laceration through the skin and muscle, + hematoma to R parietal scalp, no other signs of new trauma on the head, cspine NTTP in midline, + FROM, lungs CTAB, heart sounds normal, chest wall NTTP, abd benign, ext without deformity, edema, or TTP, + FROM

## 2019-07-07 NOTE — ED ADULT TRIAGE NOTE - CHIEF COMPLAINT QUOTE
Pt. from [facility].  pt. banged his head and face at facility sustaining a laceration to chin. noted with a deep laceration to chin. hx of autism. Denies any fall or LOC

## 2019-07-07 NOTE — ED ADULT NURSE NOTE - OBJECTIVE STATEMENT
Pt received into intake A&Ox 2 (baseline mental status) ambulatory brought in by staff S/P  banging his head and chin into a table at 4pm: Pt has laceration to frontal chin with small amount BRB.  No LOC, N/V.  He is at his usual baseline of behavior at this time.  Last tdap 2017. MD evaluated. Pt medicated as ordered for behavioral purpose. Staff at bedside, comfort measures provided. Will continue to monitor for safety and comfort.

## 2019-07-07 NOTE — ED PROVIDER NOTE - CLINICAL SUMMARY MEDICAL DECISION MAKING FREE TEXT BOX
Cabot: 29M with severe MR, autism, self injurous behavior, here with a chin laceration after banging his head and chin into a table at 4pm.  No LOC, N/V.  He is at his usual baseline of behavior at this time.  Last tdap 2017.  On exam, HDS, NAD, awake, alert, interactive, chin with 4cm laceration through the skin and muscle, no other signs of new trauma on the head, cspine NTTP in midline, + FROM, lungs CTAB, heart sounds normal, chest wall NTTP, abd benign, ext without deformity, edema, or TTP, + FROM.  No sign if ICH.  Will give ativan, haldol, benadryl, repair laceration. Cabot: 29M with severe MR, autism, self injurous behavior, here with a chin laceration after banging his head and chin into a table at 4pm.  No LOC, N/V.  He is at his usual baseline of behavior at this time.  Last tdap 2017.  On exam, HDS, NAD, awake, alert, interactive, chin with 4cm laceration through the skin and muscle, no other signs of new trauma on the head, cspine NTTP in midline, + FROM, lungs CTAB, heart sounds normal, chest wall NTTP, abd benign, ext without deformity, edema, or TTP, + FROM.  No sign if ICH.  Will give ativan, haldol, benadryl, CT head and MF, repair laceration.

## 2019-07-07 NOTE — ED PROVIDER NOTE - ATTENDING CONTRIBUTION TO CARE
I, Jennifer Cabot, MD, have performed a history and physical exam of the patient and discussed their management with the resident. I reviewed the resident's note and agree with the documented findings and plan of care. My medical decision making and observations are found above.    Cabot: 29M with severe MR, autism, self injurous behavior, here with a chin laceration after banging his head and chin into a table at 4pm.  No LOC, N/V.  He is at his usual baseline of behavior at this time.  Last tdap 2017.  On exam, HDS, NAD, awake, alert, interactive, chin with 4cm laceration through the skin and muscle, no other signs of new trauma on the head, cspine NTTP in midline, + FROM, lungs CTAB, heart sounds normal, chest wall NTTP, abd benign, ext without deformity, edema, or TTP, + FROM.  No sign if ICH.  Will give ativan, haldol, benadryl, repair laceration. I, Jennifer Cabot, MD, have performed a history and physical exam of the patient and discussed their management with the resident. I reviewed the resident's note and agree with the documented findings and plan of care. My medical decision making and observations are found above.    Cabot: 29M with severe MR, autism, self injurous behavior, here with a chin laceration after banging his head and chin into a table at 4pm.  No LOC, N/V.  He is at his usual baseline of behavior at this time.  Last tdap 2017.  On exam, HDS, NAD, awake, alert, interactive, chin with 4cm laceration through the skin and muscle, no other signs of new trauma on the head, cspine NTTP in midline, + FROM, lungs CTAB, heart sounds normal, chest wall NTTP, abd benign, ext without deformity, edema, or TTP, + FROM.  No sign if ICH.  Will give ativan, haldol, benadryl, CT head and MF, repair laceration.

## 2019-07-07 NOTE — ED PROVIDER NOTE - NSFOLLOWUPINSTRUCTIONS_ED_ALL_ED_FT
You were seen at [facility] ER for chin laceration after self injury. You had a negative CT Scan of the head and cervical spine without any blood in the brain or cervical fracture. You also have the laceration repaired with running sutures (6).     You should keep the area clean with warm soap and water.     You should have the sutures removed in 5-7 days by his Primary Care doctor or can return to the ER.     Please return for any puss draining from the laceration repair, change in mental status, or any other concerns.

## 2019-07-07 NOTE — ED PROVIDER NOTE - PROGRESS NOTE DETAILS
I saw the patient and according to staff at bedside patient was hitting is head today when he hit his chin causing a large laceration. Last tetanus 4cm, at baseline mental status, injury occurred 4 hours prior to arrival. CT scans negative [facility] ICH and Fc. Laceration repaired. Will discharge with instructions to have sutures removed in 5 days by PCP or ED.   -Imer Burgos PGY5 EMIM Pager#[phone]

## 2019-07-07 NOTE — ED PROVIDER NOTE - OBJECTIVE STATEMENT
Cabot: 29M with severe MR, autism, self injurous behavior, here with a chin laceration after banging his head and chin into a table at 4pm.  No LOC, N/V.  He is at his usual baseline of behavior at this time.  Last tdap 2017.

## 2019-07-07 NOTE — ED ADULT NURSE NOTE - CHIEF COMPLAINT QUOTE
Pt. from [facility].  pt. banged his head and face at facility sustaining a laceration to chin. noted with a deep laceration to chin. hx of autism. Denies any fall or LOC

## 2019-07-08 PROCEDURE — 70486 CT MAXILLOFACIAL W/O DYE: CPT | Mod: 26

## 2019-07-08 PROCEDURE — 70450 CT HEAD/BRAIN W/O DYE: CPT | Mod: 26

## 2019-07-08 RX ORDER — CHLORPROMAZINE HCL 10 MG
25 TABLET ORAL ONCE
Refills: 0 | Status: COMPLETED | OUTPATIENT
Start: 2019-07-08 | End: 2019-07-08

## 2019-07-08 RX ADMIN — Medication 25 MILLIGRAM(S): at 00:06

## 2019-09-23 ENCOUNTER — OUTPATIENT (OUTPATIENT)
Dept: OUTPATIENT SERVICES | Facility: HOSPITAL | Age: 29
LOS: 1 days | End: 2019-09-23
Payer: MEDICAID

## 2019-09-23 VITALS
TEMPERATURE: 98 F | DIASTOLIC BLOOD PRESSURE: 78 MMHG | HEIGHT: 66.5 IN | RESPIRATION RATE: 20 BRPM | WEIGHT: 192.02 LBS | SYSTOLIC BLOOD PRESSURE: 132 MMHG | OXYGEN SATURATION: 98 % | HEART RATE: 78 BPM

## 2019-09-23 DIAGNOSIS — K05.6 PERIODONTAL DISEASE, UNSPECIFIED: ICD-10-CM

## 2019-09-23 DIAGNOSIS — Z01.818 ENCOUNTER FOR OTHER PREPROCEDURAL EXAMINATION: ICD-10-CM

## 2019-09-23 DIAGNOSIS — K02.9 DENTAL CARIES, UNSPECIFIED: ICD-10-CM

## 2019-09-23 DIAGNOSIS — K02.62 DENTAL CARIES ON SMOOTH SURFACE PENETRATING INTO DENTIN: ICD-10-CM

## 2019-09-23 DIAGNOSIS — R56.9 UNSPECIFIED CONVULSIONS: ICD-10-CM

## 2019-09-23 DIAGNOSIS — Z98.811 DENTAL RESTORATION STATUS: Chronic | ICD-10-CM

## 2019-09-23 LAB
ANION GAP SERPL CALC-SCNC: 12 MMOL/L — SIGNIFICANT CHANGE UP (ref 5–17)
BUN SERPL-MCNC: 11 MG/DL — SIGNIFICANT CHANGE UP (ref 7–23)
CALCIUM SERPL-MCNC: 9.9 MG/DL — SIGNIFICANT CHANGE UP (ref 8.4–10.5)
CHLORIDE SERPL-SCNC: 93 MMOL/L — LOW (ref 96–108)
CO2 SERPL-SCNC: 24 MMOL/L — SIGNIFICANT CHANGE UP (ref 22–31)
CREAT SERPL-MCNC: 0.61 MG/DL — SIGNIFICANT CHANGE UP (ref 0.5–1.3)
GLUCOSE SERPL-MCNC: 91 MG/DL — SIGNIFICANT CHANGE UP (ref 70–99)
HCT VFR BLD CALC: 45.1 % — SIGNIFICANT CHANGE UP (ref 39–50)
HGB BLD-MCNC: 14.9 G/DL — SIGNIFICANT CHANGE UP (ref 13–17)
MCHC RBC-ENTMCNC: 31.2 PG — SIGNIFICANT CHANGE UP (ref 27–34)
MCHC RBC-ENTMCNC: 33 GM/DL — SIGNIFICANT CHANGE UP (ref 32–36)
MCV RBC AUTO: 94.5 FL — SIGNIFICANT CHANGE UP (ref 80–100)
PLATELET # BLD AUTO: 238 K/UL — SIGNIFICANT CHANGE UP (ref 150–400)
POTASSIUM SERPL-MCNC: 4.2 MMOL/L — SIGNIFICANT CHANGE UP (ref 3.5–5.3)
POTASSIUM SERPL-SCNC: 4.2 MMOL/L — SIGNIFICANT CHANGE UP (ref 3.5–5.3)
RBC # BLD: 4.77 M/UL — SIGNIFICANT CHANGE UP (ref 4.2–5.8)
RBC # FLD: 12.3 % — SIGNIFICANT CHANGE UP (ref 10.3–14.5)
SODIUM SERPL-SCNC: 129 MMOL/L — LOW (ref 135–145)
WBC # BLD: 7.01 K/UL — SIGNIFICANT CHANGE UP (ref 3.8–10.5)
WBC # FLD AUTO: 7.01 K/UL — SIGNIFICANT CHANGE UP (ref 3.8–10.5)

## 2019-09-23 PROCEDURE — 85027 COMPLETE CBC AUTOMATED: CPT

## 2019-09-23 PROCEDURE — 80048 BASIC METABOLIC PNL TOTAL CA: CPT

## 2019-09-23 PROCEDURE — G0463: CPT

## 2019-09-23 RX ORDER — OXCARBAZEPINE 300 MG/1
1 TABLET, FILM COATED ORAL
Qty: 0 | Refills: 0 | DISCHARGE

## 2019-09-23 RX ORDER — DIAZEPAM 5 MG
1 TABLET ORAL
Qty: 0 | Refills: 0 | DISCHARGE

## 2019-09-23 RX ORDER — FLUTICASONE PROPIONATE 50 MCG
1 SPRAY, SUSPENSION NASAL
Qty: 0 | Refills: 0 | DISCHARGE

## 2019-09-23 RX ORDER — PSYLLIUM SEED (WITH DEXTROSE)
2 POWDER (GRAM) ORAL
Qty: 0 | Refills: 0 | DISCHARGE

## 2019-09-23 RX ORDER — LACTULOSE 10 G/15ML
1 SOLUTION ORAL
Qty: 0 | Refills: 0 | DISCHARGE

## 2019-09-23 RX ORDER — OXYBUTYNIN CHLORIDE 5 MG
1 TABLET ORAL
Qty: 0 | Refills: 0 | DISCHARGE

## 2019-09-23 NOTE — H&P PST ADULT - HISTORY OF PRESENT ILLNESS
28 y/o male with PMH: Seizure Disorder: medically mangaed. No seizure activity in years,  Cerebral Palsy, Autism, Mental Retardation: simple verbalizations, co-operative with care, Ambulatory, steady Gait, self mutilating behavior: bangs head: wears helmet as safety precaution. Current dx: Dental Caries. Scheduled: Comprehensive Dental Treatment on 10/7/19.

## 2019-09-23 NOTE — H&P PST ADULT - RS GEN PE MLT RESP DETAILS PC
airway patent/normal/respirations non-labored/clear to auscultation bilaterally/breath sounds equal/good air movement

## 2019-09-23 NOTE — H&P PST ADULT - NSICDXPASTMEDICALHX_GEN_ALL_CORE_FT
PAST MEDICAL HISTORY:  Autism     Constipation     Enuresis     GERD (gastroesophageal reflux disease)     Hyperlipemia     Hypothyroid     Lead poisoning     Low HDL (under 40)     Lyme disease     Mental retardation severe    Pervasive developmental disorder, active with self injurous behavior    Seizure disorder

## 2019-09-23 NOTE — H&P PST ADULT - NSICDXPROBLEM_GEN_ALL_CORE_FT
PROBLEM DIAGNOSES  Problem: Dental caries  Assessment and Plan: Comprehensive dental treatement     Problem: Seizure  Assessment and Plan: Seizure precautions

## 2019-10-06 ENCOUNTER — TRANSCRIPTION ENCOUNTER (OUTPATIENT)
Age: 29
End: 2019-10-06

## 2019-10-06 NOTE — ASU DISCHARGE PLAN (ADULT/PEDIATRIC) - ASU DC SPECIAL INSTRUCTIONSFT
comprehensive dental tx under ga performed    tylenol prn pain    see Dr Day in one to two weeks [phone]    return to program tomorrow comprehensive dental tx under ga performed    tylenol prn pain    see Dr Day in one to two weeks [phone]    return to program tomorrow    Stage 2 to be scheduled in the near future

## 2019-10-06 NOTE — ASU DISCHARGE PLAN (ADULT/PEDIATRIC) - NURSING INSTRUCTIONS
You received IV Tylenol for pain management at 0800. Please DO NOT take any Tylenol (Acetaminophen) containing products, such as Vicodin, Percocet, Excedrin, and cold medications for the next 6 hours (until 2:00PM). DO NOT TAKE MORE THAN 3000 MG OF TYLENOL in a 24 hour period.

## 2019-10-06 NOTE — ASU DISCHARGE PLAN (ADULT/PEDIATRIC) - CALL YOUR DOCTOR IF YOU HAVE ANY OF THE FOLLOWING:
Fever greater than (need to indicate Fahrenheit or Celsius)/Pain not relieved by Medications/Swelling that gets worse/Bleeding that does not stop/Wound/Surgical Site with redness, or foul smelling discharge or pus

## 2019-10-07 ENCOUNTER — OUTPATIENT (OUTPATIENT)
Dept: OUTPATIENT SERVICES | Facility: HOSPITAL | Age: 29
LOS: 1 days | End: 2019-10-07
Payer: MEDICAID

## 2019-10-07 VITALS
RESPIRATION RATE: 18 BRPM | SYSTOLIC BLOOD PRESSURE: 119 MMHG | OXYGEN SATURATION: 98 % | WEIGHT: 192.02 LBS | HEIGHT: 64 IN | TEMPERATURE: 97 F | HEART RATE: 75 BPM | DIASTOLIC BLOOD PRESSURE: 70 MMHG

## 2019-10-07 VITALS
OXYGEN SATURATION: 99 % | SYSTOLIC BLOOD PRESSURE: 140 MMHG | TEMPERATURE: 98 F | HEART RATE: 80 BPM | DIASTOLIC BLOOD PRESSURE: 78 MMHG | RESPIRATION RATE: 20 BRPM

## 2019-10-07 DIAGNOSIS — Z98.811 DENTAL RESTORATION STATUS: Chronic | ICD-10-CM

## 2019-10-07 DIAGNOSIS — K02.62 DENTAL CARIES ON SMOOTH SURFACE PENETRATING INTO DENTIN: ICD-10-CM

## 2019-10-07 DIAGNOSIS — K05.6 PERIODONTAL DISEASE, UNSPECIFIED: ICD-10-CM

## 2019-10-07 PROCEDURE — D2391: CPT

## 2019-10-07 PROCEDURE — D2393: CPT

## 2019-10-07 PROCEDURE — D1110: CPT

## 2019-10-07 PROCEDURE — D2392: CPT

## 2019-10-07 PROCEDURE — D2394: CPT

## 2019-10-07 PROCEDURE — D4355: CPT

## 2019-10-07 RX ORDER — ONDANSETRON 8 MG/1
4 TABLET, FILM COATED ORAL ONCE
Refills: 0 | Status: DISCONTINUED | OUTPATIENT
Start: 2019-10-07 | End: 2019-10-22

## 2019-10-07 RX ORDER — SODIUM CHLORIDE 9 MG/ML
1000 INJECTION, SOLUTION INTRAVENOUS
Refills: 0 | Status: DISCONTINUED | OUTPATIENT
Start: 2019-10-07 | End: 2019-10-22

## 2019-10-07 NOTE — PRE-ANESTHESIA EVALUATION ADULT - NSANTHPMHFT_GEN_ALL_CORE
seizures well controlled - no recent seizures  METS>6 seizures well controlled - no recent seizures  Na chronically low, stable 130-131  METS>6

## 2019-11-05 RX ORDER — LIDOCAINE HCL 20 MG/ML
0.2 VIAL (ML) INJECTION ONCE
Refills: 0 | Status: DISCONTINUED | OUTPATIENT
Start: 2019-11-08 | End: 2019-11-28

## 2019-11-05 RX ORDER — SODIUM CHLORIDE 9 MG/ML
3 INJECTION INTRAMUSCULAR; INTRAVENOUS; SUBCUTANEOUS EVERY 8 HOURS
Refills: 0 | Status: DISCONTINUED | OUTPATIENT
Start: 2019-11-08 | End: 2019-11-28

## 2019-11-06 NOTE — ASU DISCHARGE PLAN (ADULT/PEDIATRIC) - CALL YOUR DOCTOR IF YOU HAVE ANY OF THE FOLLOWING:
Pain not relieved by Medications/Fever greater than (need to indicate Fahrenheit or Celsius)/Swelling that gets worse/Bleeding that does not stop/Wound/Surgical Site with redness, or foul smelling discharge or pus

## 2019-11-06 NOTE — ASU DISCHARGE PLAN (ADULT/PEDIATRIC) - ASU DC SPECIAL INSTRUCTIONSFT
comprehensive dental tx under [place]    see DR Day in one week     tylenol prn pain    return to program tomorrow

## 2019-11-07 ENCOUNTER — TRANSCRIPTION ENCOUNTER (OUTPATIENT)
Age: 29
End: 2019-11-07

## 2019-11-08 ENCOUNTER — OUTPATIENT (OUTPATIENT)
Dept: OUTPATIENT SERVICES | Facility: HOSPITAL | Age: 29
LOS: 1 days | End: 2019-11-08
Payer: MEDICAID

## 2019-11-08 VITALS
DIASTOLIC BLOOD PRESSURE: 86 MMHG | SYSTOLIC BLOOD PRESSURE: 141 MMHG | OXYGEN SATURATION: 100 % | RESPIRATION RATE: 14 BRPM | HEART RATE: 71 BPM

## 2019-11-08 VITALS — WEIGHT: 192.02 LBS | OXYGEN SATURATION: 97 % | TEMPERATURE: 97 F | HEART RATE: 72 BPM | RESPIRATION RATE: 18 BRPM

## 2019-11-08 DIAGNOSIS — Z98.811 DENTAL RESTORATION STATUS: Chronic | ICD-10-CM

## 2019-11-08 DIAGNOSIS — K02.62 DENTAL CARIES ON SMOOTH SURFACE PENETRATING INTO DENTIN: ICD-10-CM

## 2019-11-08 DIAGNOSIS — K05.6 PERIODONTAL DISEASE, UNSPECIFIED: ICD-10-CM

## 2019-11-08 PROCEDURE — D2393: CPT

## 2019-11-08 PROCEDURE — D2394: CPT

## 2019-11-08 PROCEDURE — D2391: CPT

## 2019-11-08 RX ORDER — SODIUM CHLORIDE 9 MG/ML
1000 INJECTION, SOLUTION INTRAVENOUS
Refills: 0 | Status: DISCONTINUED | OUTPATIENT
Start: 2019-11-08 | End: 2019-11-28

## 2019-11-08 RX ORDER — ONDANSETRON 8 MG/1
4 TABLET, FILM COATED ORAL ONCE
Refills: 0 | Status: DISCONTINUED | OUTPATIENT
Start: 2019-11-08 | End: 2019-11-28

## 2019-11-26 NOTE — ED ADULT TRIAGE NOTE - SPO2 (%)
[facility] Cardiology OP Consult Note    Kj Valadez  [id number]  2019    Referred By: Mary Kay Taylor AP*    Chief Complaint: Abnormal ECG    History of Present Illness:   Mr. Kj Valadez is a 64 y.o. male who presents to the Cardiology Clinic for evaluation of hypertension and an abnormal ECG.  The patient has a past medical history significant for type 2 diabetes mellitus with a hemoglobin A1c of 10.4%, hypertension, and dyslipidemia.  He is previously had both splenectomy and pancreatectomy.  He does not have any past cardiac history.  He presents the cardiology clinic today as a referral from his PCP after he was noted to have an abnormal ECG.  At the time of his recent PCP follow-up, an ECG revealed a right bundle branch block, as well as a possible prior inferior MI.  The patient denies any prior history of chest pain or chest discomfort.  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  He frequently walks uphill while carrying bags of feed, and he is able to do so without exertional chest pain or significant exertional dyspnea.  He denies orthopnea, PND, or lower extremity edema.  No syncopal or presyncopal events in the past.  He does report a long history of hypertension, and he has been somewhat intolerant of several antihypertensives in the past.  He brings his home BP log today for review, showing his systolic BP frequently in the 130s.  He has no specific complaints at this time.    Past Cardiac Testin. Last Coronary Angio: None  2. Prior Stress Testing: None  3. Last Echo: None  4. Prior Holter Monitor: None    Review of Systems:   Review of Systems   Constitutional: Negative for activity change, appetite change, chills, diaphoresis, fatigue, fever, unexpected weight gain and unexpected weight loss.   Respiratory: Negative for cough, chest tightness, shortness of breath and wheezing.    Cardiovascular: Negative for chest pain, palpitations and  leg swelling.   Gastrointestinal: Negative for abdominal pain, anal bleeding, blood in stool and GERD.   Endocrine: Negative for cold intolerance and heat intolerance.   Genitourinary: Negative for hematuria.   Neurological: Negative for dizziness, syncope, weakness and light-headedness.   Hematological: Does not bruise/bleed easily.   Psychiatric/Behavioral: Negative for depressed mood and stress. The patient is not nervous/anxious.        Past Medical History:   Past Medical History:   Diagnosis Date   • Basal cell carcinoma (BCC) of skin of right upper extremity including shoulder    • Diabetes mellitus (CMS/HCC)    • Hyperlipidemia     Spouse Denies    • Hypertension        Past Surgical History:   Past Surgical History:   Procedure Laterality Date   • PANCREATECTOMY  2006   • SKIN LESION EXCISION Right 6/14/2019    Procedure: Wide local excision of a right forearm skin lesion with frozen section and complex layered closure;  Surgeon: Camille Pereira MD;  Location: [facility];  Service: General   • SPLENECTOMY  2006       Family History:   Family History   Problem Relation Age of Onset   • Heart attack Other    • Hypertension Other    • Ovarian cancer Other    • Ovarian cancer Mother    • Heart disease Father    • Heart attack Father    • Obesity Sister        Social History:   Social History     Socioeconomic History   • Marital status:      Spouse name: Not on file   • Number of children: Not on file   • Years of education: Not on file   • Highest education level: Not on file   Tobacco Use   • Smoking status: Never Smoker   • Smokeless tobacco: Never Used   • Tobacco comment: Spouse Denies    Substance and Sexual Activity   • Alcohol use: No     Frequency: Never     Comment: Spouse Denies    • Drug use: No     Comment: Spouse Denies    • Sexual activity: Defer       Medications:     Current Outpatient Medications:   •  amLODIPine (NORVASC) 5 MG tablet, Take 1 tablet by mouth Daily., Disp: 30 tablet,  "Rfl: 11  •  aspirin 81 MG tablet, Take  by mouth., Disp: , Rfl:   •  Blood Glucose Monitoring Suppl (FREESTYLE LITE) device, Test blood glucose three times daily, Disp: 1 each, Rfl: 0  •  carvedilol (COREG) 12.5 MG tablet, Take 1 tablet by mouth 2 (Two) Times a Day With Meals., Disp: 60 tablet, Rfl: 11  •  cetirizine (ZyrTEC) 10 MG tablet, Take 10 mg by mouth Daily., Disp: , Rfl:   •  glucose blood test strip, Use to test blood sugar three times daily, Disp: 100 each, Rfl: 11  •  insulin detemir (LEVEMIR) 100 UNIT/ML injection, Inject 15 Units under the skin into the appropriate area as directed Daily., Disp: 10 mL, Rfl: 12  •  Insulin Pen Needle 31G X 5 MM misc, Use to inject Tresiba twice daily, Disp: 100 each, Rfl: 11  •  Insulin Syringe-Needle U-100 31G X 5/16\" 0.3 ML misc, Use as directed to inject insulin once daily, Disp: 100 each, Rfl: 3  •  Lancets (FREESTYLE) lancets, Use to test blood sugar three times daily, Disp: 100 each, Rfl: 11  •  triamterene-hydrochlorothiazide (MAXZIDE) 75-50 MG per tablet, Take 1 tablet by mouth Daily., Disp: 30 tablet, Rfl: 11  •  valsartan (DIOVAN) 320 MG tablet, Take 1 tablet by mouth Daily., Disp: 30 tablet, Rfl: 11    Current Facility-Administered Medications:   •  cloNIDine (CATAPRES) tablet 0.2 mg, 0.2 mg, Oral, Q12H, Melba Malone APRN    Allergies:   Allergies   Allergen Reactions   • No Known Drug Allergy        Physical Exam:  Vital Signs:   Vitals:    11/26/19 0818 11/26/19 0828   BP: (!) 170/102 (!) 154/102   BP Location: Right arm Left arm   Patient Position: Sitting Sitting   Cuff Size: Large Adult Large Adult   Pulse: 75    SpO2: 98%    Weight: 110 kg (243 lb)    Height: 182.9 cm (72\")        Physical Exam   Constitutional: He is oriented to person, place, and time. He appears well-developed and well-nourished. No distress.   HENT:   Head: Normocephalic and atraumatic.   Moist Mucous Membranes.    Eyes: EOM are normal. Pupils are equal, round, and reactive to " light. No scleral icterus.   Neck: No tracheal deviation present.   Cardiovascular: Normal rate, regular rhythm, normal heart sounds and intact distal pulses. Exam reveals no gallop and no friction rub.   No murmur heard.  Normal JVD.     Pulmonary/Chest: Effort normal and breath sounds normal. No stridor. No respiratory distress. He has no wheezes. He has no rales. He exhibits no tenderness.   Abdominal: Soft. Bowel sounds are normal. He exhibits no distension. There is no tenderness. There is no rebound and no guarding.   Musculoskeletal: Normal range of motion. He exhibits no edema.   Lymphadenopathy:     He has no cervical adenopathy.   Neurological: He is alert and oriented to person, place, and time.   Skin: Skin is warm and dry. He is not diaphoretic. No erythema.   Psychiatric: He has a normal mood and affect. His behavior is normal.       Results Review:   I reviewed the patient's new clinical results.  Labs reviewed from 5/31/2019:  1.  Hemoglobin A1c: 10.4%  2.  Lipid profile: Total cholesterol 154, triglycerides 86, HDL 40, LDL 97    ECG 11/11/2019: Sinus rhythm at 67 bpm.  Right bundle branch block.  Cannot rule out prior inferior MI.    Assessment / Plan:     1.  Abnormal ECG  --Recent ECG shows sinus rhythm with a left bundle branch block, cannot rule out inferior MI  --Currently no anginal symptoms or evidence of CHF  --Will obtain echocardiogram to evaluate LVEF and regional wall motion  --If LVEF is preserved and no regional wall motion abnormalities, then no indication for further evaluation given lack of anginal symptoms  --Follow-up in 1 month to review results of his echocardiogram    2.  Essential hypertension  --BP uncontrolled today, however home BP log shows adequate control  --Will continue current antihypertensives  --If the patient is hypertensive at his next follow-up appointment, will then uptitrate carvedilol    3.  Type 2 diabetes mellitus   --Poorly controlled with hemoglobin A1c  10.4%  --Further management per PCP        Preventative Cardiology:   Tobacco Cessation: N/A  Obstructive Sleep Apnea Screening: Deffered  AAA Screening: N/A  Peripheral Arterial Disease Screening: N/A    Follow Up:   Return in about 4 weeks (around 12/24/2019).      Thank you for allowing me to participate in the care of your patient. Please to not hesitate to contact me with additional questions or concerns.     RAS James MD  Interventional Cardiology   11/26/2019  8:32 AM                     97

## 2020-01-08 ENCOUNTER — EMERGENCY (EMERGENCY)
Facility: HOSPITAL | Age: 30
LOS: 1 days | Discharge: HOME | End: 2020-01-08
Attending: EMERGENCY MEDICINE | Admitting: EMERGENCY MEDICINE
Payer: MEDICAID

## 2020-01-08 VITALS
DIASTOLIC BLOOD PRESSURE: 75 MMHG | TEMPERATURE: 98 F | HEART RATE: 82 BPM | SYSTOLIC BLOOD PRESSURE: 137 MMHG | OXYGEN SATURATION: 100 % | RESPIRATION RATE: 16 BRPM

## 2020-01-08 DIAGNOSIS — Z98.811 DENTAL RESTORATION STATUS: Chronic | ICD-10-CM

## 2020-01-08 PROCEDURE — 99282 EMERGENCY DEPT VISIT SF MDM: CPT

## 2020-01-08 NOTE — ED ADULT TRIAGE NOTE - CHIEF COMPLAINT QUOTE
Pt brought in from a group home because pt hit his head on the floor earlier today. As per staff pt was complaining of head pain. Pt has a hx of MR.

## 2020-01-08 NOTE — ED PROVIDER NOTE - PATIENT PORTAL LINK FT
You can access the FollowMyHealth Patient Portal offered by [facility] by registering at the following website: [email or web address]. By joining [email or web address]’s FollowMyHealth portal, you will also be able to view your health information using other applications (apps) compatible with our system.

## 2020-01-08 NOTE — ED PROVIDER NOTE - NSFOLLOWUPINSTRUCTIONS_ED_ALL_ED_FT
Keep abrasions clean and dry.  If vomiting, lethargy, weakness, or any worse symptoms return to the ER.  Keep helmet on at all times when possible.  Followup with your primary care doctor.

## 2020-01-08 NOTE — ED PROVIDER NOTE - OBJECTIVE STATEMENT
brought in by [facility] after pt had usual outburst, lay on floor and started banging head against floor.  Had helmet on but partially slipped off head.  NO LOC, weakness, vomiting.  Incident occurred at 9am and pt presented to the eD in afternoon. Pt has been usual self without complaints.

## 2020-01-08 NOTE — ED PROVIDER NOTE - CLINICAL SUMMARY MEDICAL DECISION MAKING FREE TEXT BOX
Pt s/p minor head injury without fall.  With abrasion on back of head.  No evidence of other injury and well appearing, ambulatory and smiling.    Head injury precautions given to staff and for discharge back to group home with staff.

## 2020-03-11 ENCOUNTER — TRANSCRIPTION ENCOUNTER (OUTPATIENT)
Age: 30
End: 2020-03-11

## 2020-03-23 ENCOUNTER — INPATIENT (INPATIENT)
Facility: HOSPITAL | Age: 30
LOS: 13 days | Discharge: NURSING, REHAB OR LONG-TERM CARE | DRG: 871 | End: 2020-04-06
Attending: PEDIATRICS | Admitting: HOSPITALIST
Payer: MEDICAID

## 2020-03-23 ENCOUNTER — TRANSCRIPTION ENCOUNTER (OUTPATIENT)
Age: 30
End: 2020-03-23

## 2020-03-23 VITALS
HEART RATE: 135 BPM | WEIGHT: 199.96 LBS | SYSTOLIC BLOOD PRESSURE: 116 MMHG | HEIGHT: 66 IN | DIASTOLIC BLOOD PRESSURE: 66 MMHG | OXYGEN SATURATION: 98 % | RESPIRATION RATE: 23 BRPM | TEMPERATURE: 103 F

## 2020-03-23 DIAGNOSIS — B34.9 VIRAL INFECTION, UNSPECIFIED: ICD-10-CM

## 2020-03-23 DIAGNOSIS — A41.9 SEPSIS, UNSPECIFIED ORGANISM: ICD-10-CM

## 2020-03-23 DIAGNOSIS — J18.9 PNEUMONIA, UNSPECIFIED ORGANISM: ICD-10-CM

## 2020-03-23 DIAGNOSIS — G40.909 EPILEPSY, UNSPECIFIED, NOT INTRACTABLE, WITHOUT STATUS EPILEPTICUS: ICD-10-CM

## 2020-03-23 DIAGNOSIS — Z29.9 ENCOUNTER FOR PROPHYLACTIC MEASURES, UNSPECIFIED: ICD-10-CM

## 2020-03-23 DIAGNOSIS — Z98.811 DENTAL RESTORATION STATUS: Chronic | ICD-10-CM

## 2020-03-23 DIAGNOSIS — Z02.9 ENCOUNTER FOR ADMINISTRATIVE EXAMINATIONS, UNSPECIFIED: ICD-10-CM

## 2020-03-23 LAB
ALBUMIN SERPL ELPH-MCNC: 3.8 G/DL — SIGNIFICANT CHANGE UP (ref 3.3–5)
ALP SERPL-CCNC: 64 U/L — SIGNIFICANT CHANGE UP (ref 40–120)
ALT FLD-CCNC: 17 U/L — SIGNIFICANT CHANGE UP (ref 10–45)
ANION GAP SERPL CALC-SCNC: 17 MMOL/L — SIGNIFICANT CHANGE UP (ref 5–17)
APTT BLD: 33 SEC — SIGNIFICANT CHANGE UP (ref 27.5–36.3)
AST SERPL-CCNC: 21 U/L — SIGNIFICANT CHANGE UP (ref 10–40)
BASE EXCESS BLDV CALC-SCNC: 2.6 MMOL/L — HIGH (ref -2–2)
BASOPHILS # BLD AUTO: 0.02 K/UL — SIGNIFICANT CHANGE UP (ref 0–0.2)
BASOPHILS NFR BLD AUTO: 0.3 % — SIGNIFICANT CHANGE UP (ref 0–2)
BILIRUB SERPL-MCNC: 0.3 MG/DL — SIGNIFICANT CHANGE UP (ref 0.2–1.2)
BUN SERPL-MCNC: 14 MG/DL — SIGNIFICANT CHANGE UP (ref 7–23)
CA-I SERPL-SCNC: 1.1 MMOL/L — LOW (ref 1.12–1.3)
CALCIUM SERPL-MCNC: 8.7 MG/DL — SIGNIFICANT CHANGE UP (ref 8.4–10.5)
CHLORIDE BLDV-SCNC: 99 MMOL/L — SIGNIFICANT CHANGE UP (ref 96–108)
CHLORIDE SERPL-SCNC: 94 MMOL/L — LOW (ref 96–108)
CO2 BLDV-SCNC: 27 MMOL/L — SIGNIFICANT CHANGE UP (ref 22–30)
CO2 SERPL-SCNC: 22 MMOL/L — SIGNIFICANT CHANGE UP (ref 22–31)
CREAT SERPL-MCNC: 0.79 MG/DL — SIGNIFICANT CHANGE UP (ref 0.5–1.3)
EOSINOPHIL # BLD AUTO: 0 K/UL — SIGNIFICANT CHANGE UP (ref 0–0.5)
EOSINOPHIL NFR BLD AUTO: 0 % — SIGNIFICANT CHANGE UP (ref 0–6)
GAS PNL BLDV: 131 MMOL/L — LOW (ref 135–145)
GAS PNL BLDV: SIGNIFICANT CHANGE UP
GAS PNL BLDV: SIGNIFICANT CHANGE UP
GLUCOSE BLDV-MCNC: 99 MG/DL — SIGNIFICANT CHANGE UP (ref 70–99)
GLUCOSE SERPL-MCNC: 100 MG/DL — HIGH (ref 70–99)
HCO3 BLDV-SCNC: 26 MMOL/L — SIGNIFICANT CHANGE UP (ref 21–29)
HCT VFR BLD CALC: 37.1 % — LOW (ref 39–50)
HCT VFR BLDA CALC: 60 % — HIGH (ref 39–50)
HGB BLD CALC-MCNC: 19.7 G/DL — CRITICAL HIGH (ref 13–17)
HGB BLD-MCNC: 12.9 G/DL — LOW (ref 13–17)
IMM GRANULOCYTES NFR BLD AUTO: 0.4 % — SIGNIFICANT CHANGE UP (ref 0–1.5)
INR BLD: 1.45 RATIO — HIGH (ref 0.88–1.16)
LACTATE BLDV-MCNC: 1.3 MMOL/L — SIGNIFICANT CHANGE UP (ref 0.7–2)
LYMPHOCYTES # BLD AUTO: 0.67 K/UL — LOW (ref 1–3.3)
LYMPHOCYTES # BLD AUTO: 10 % — LOW (ref 13–44)
MCHC RBC-ENTMCNC: 31.4 PG — SIGNIFICANT CHANGE UP (ref 27–34)
MCHC RBC-ENTMCNC: 34.8 GM/DL — SIGNIFICANT CHANGE UP (ref 32–36)
MCV RBC AUTO: 90.3 FL — SIGNIFICANT CHANGE UP (ref 80–100)
MONOCYTES # BLD AUTO: 0.82 K/UL — SIGNIFICANT CHANGE UP (ref 0–0.9)
MONOCYTES NFR BLD AUTO: 12.2 % — SIGNIFICANT CHANGE UP (ref 2–14)
NEUTROPHILS # BLD AUTO: 5.19 K/UL — SIGNIFICANT CHANGE UP (ref 1.8–7.4)
NEUTROPHILS NFR BLD AUTO: 77.1 % — HIGH (ref 43–77)
NRBC # BLD: 0 /100 WBCS — SIGNIFICANT CHANGE UP (ref 0–0)
PCO2 BLDV: 36 MMHG — SIGNIFICANT CHANGE UP (ref 35–50)
PH BLDV: 7.46 — HIGH (ref 7.35–7.45)
PLATELET # BLD AUTO: 122 K/UL — LOW (ref 150–400)
PO2 BLDV: 56 MMHG — HIGH (ref 25–45)
POTASSIUM BLDV-SCNC: 4.4 MMOL/L — SIGNIFICANT CHANGE UP (ref 3.5–5.3)
POTASSIUM SERPL-MCNC: 4.4 MMOL/L — SIGNIFICANT CHANGE UP (ref 3.5–5.3)
POTASSIUM SERPL-SCNC: 4.4 MMOL/L — SIGNIFICANT CHANGE UP (ref 3.5–5.3)
PROT SERPL-MCNC: 7 G/DL — SIGNIFICANT CHANGE UP (ref 6–8.3)
PROTHROM AB SERPL-ACNC: 16.7 SEC — HIGH (ref 10–12.9)
RBC # BLD: 4.11 M/UL — LOW (ref 4.2–5.8)
RBC # FLD: 12.8 % — SIGNIFICANT CHANGE UP (ref 10.3–14.5)
SAO2 % BLDV: 89 % — HIGH (ref 67–88)
SARS-COV-2 RNA SPEC QL NAA+PROBE: DETECTED
SODIUM SERPL-SCNC: 133 MMOL/L — LOW (ref 135–145)
WBC # BLD: 6.73 K/UL — SIGNIFICANT CHANGE UP (ref 3.8–10.5)
WBC # FLD AUTO: 6.73 K/UL — SIGNIFICANT CHANGE UP (ref 3.8–10.5)

## 2020-03-23 PROCEDURE — 71250 CT THORAX DX C-: CPT | Mod: 26

## 2020-03-23 PROCEDURE — 71045 X-RAY EXAM CHEST 1 VIEW: CPT | Mod: 26

## 2020-03-23 PROCEDURE — 99285 EMERGENCY DEPT VISIT HI MDM: CPT

## 2020-03-23 PROCEDURE — 99222 1ST HOSP IP/OBS MODERATE 55: CPT | Mod: GC

## 2020-03-23 PROCEDURE — 93010 ELECTROCARDIOGRAM REPORT: CPT

## 2020-03-23 RX ORDER — ACETAMINOPHEN 500 MG
650 TABLET ORAL EVERY 6 HOURS
Refills: 0 | Status: DISCONTINUED | OUTPATIENT
Start: 2020-03-23 | End: 2020-04-06

## 2020-03-23 RX ORDER — THIOTHIXENE 5 MG
10 CAPSULE ORAL
Refills: 0 | Status: DISCONTINUED | OUTPATIENT
Start: 2020-03-23 | End: 2020-04-06

## 2020-03-23 RX ORDER — HYDROXYCHLOROQUINE SULFATE 200 MG
400 TABLET ORAL EVERY 12 HOURS
Refills: 0 | Status: COMPLETED | OUTPATIENT
Start: 2020-03-23 | End: 2020-03-24

## 2020-03-23 RX ORDER — OXYBUTYNIN CHLORIDE 5 MG
5 TABLET ORAL THREE TIMES A DAY
Refills: 0 | Status: DISCONTINUED | OUTPATIENT
Start: 2020-03-23 | End: 2020-04-06

## 2020-03-23 RX ORDER — THIOTHIXENE 5 MG
5 CAPSULE ORAL
Refills: 0 | Status: DISCONTINUED | OUTPATIENT
Start: 2020-03-23 | End: 2020-03-23

## 2020-03-23 RX ORDER — PIPERACILLIN AND TAZOBACTAM 4; .5 G/20ML; G/20ML
3.38 INJECTION, POWDER, LYOPHILIZED, FOR SOLUTION INTRAVENOUS ONCE
Refills: 0 | Status: COMPLETED | OUTPATIENT
Start: 2020-03-23 | End: 2020-03-23

## 2020-03-23 RX ORDER — ENOXAPARIN SODIUM 100 MG/ML
40 INJECTION SUBCUTANEOUS DAILY
Refills: 0 | Status: DISCONTINUED | OUTPATIENT
Start: 2020-03-23 | End: 2020-04-06

## 2020-03-23 RX ORDER — OXCARBAZEPINE 300 MG/1
600 TABLET, FILM COATED ORAL
Refills: 0 | Status: DISCONTINUED | OUTPATIENT
Start: 2020-03-23 | End: 2020-04-06

## 2020-03-23 RX ORDER — DIVALPROEX SODIUM 500 MG/1
500 TABLET, DELAYED RELEASE ORAL
Refills: 0 | Status: DISCONTINUED | OUTPATIENT
Start: 2020-03-23 | End: 2020-04-06

## 2020-03-23 RX ORDER — CLINDAMYCIN PHOSPHATE GEL USP, 1% 10 MG/G
1 GEL TOPICAL
Qty: 0 | Refills: 0 | DISCHARGE

## 2020-03-23 RX ORDER — HYDROXYCHLOROQUINE SULFATE 200 MG
TABLET ORAL
Refills: 0 | Status: COMPLETED | OUTPATIENT
Start: 2020-03-23 | End: 2020-03-28

## 2020-03-23 RX ORDER — OXCARBAZEPINE 300 MG/1
1200 TABLET, FILM COATED ORAL
Refills: 0 | Status: DISCONTINUED | OUTPATIENT
Start: 2020-03-23 | End: 2020-04-06

## 2020-03-23 RX ORDER — VANCOMYCIN HCL 1 G
1000 VIAL (EA) INTRAVENOUS ONCE
Refills: 0 | Status: COMPLETED | OUTPATIENT
Start: 2020-03-23 | End: 2020-03-23

## 2020-03-23 RX ORDER — LEVOTHYROXINE SODIUM 125 MCG
50 TABLET ORAL DAILY
Refills: 0 | Status: DISCONTINUED | OUTPATIENT
Start: 2020-03-23 | End: 2020-04-06

## 2020-03-23 RX ORDER — CALCIUM POLYCARBOPHIL 625 MG/1
1 TABLET, FILM COATED ORAL
Qty: 0 | Refills: 0 | DISCHARGE

## 2020-03-23 RX ORDER — MULTIVIT WITH MIN/MFOLATE/K2 340-15/3 G
1 POWDER (GRAM) ORAL
Qty: 0 | Refills: 0 | DISCHARGE

## 2020-03-23 RX ORDER — OXCARBAZEPINE 300 MG/1
1 TABLET, FILM COATED ORAL
Qty: 0 | Refills: 0 | DISCHARGE

## 2020-03-23 RX ORDER — SODIUM CHLORIDE 9 MG/ML
2000 INJECTION, SOLUTION INTRAVENOUS ONCE
Refills: 0 | Status: COMPLETED | OUTPATIENT
Start: 2020-03-23 | End: 2020-03-23

## 2020-03-23 RX ORDER — SODIUM CHLORIDE 0.65 %
2 AEROSOL, SPRAY (ML) NASAL
Qty: 0 | Refills: 0 | DISCHARGE

## 2020-03-23 RX ORDER — DIAZEPAM 5 MG
10 TABLET ORAL DAILY
Refills: 0 | Status: DISCONTINUED | OUTPATIENT
Start: 2020-03-23 | End: 2020-03-30

## 2020-03-23 RX ORDER — DESMOPRESSIN ACETATE 0.1 MG/1
0.2 TABLET ORAL AT BEDTIME
Refills: 0 | Status: DISCONTINUED | OUTPATIENT
Start: 2020-03-23 | End: 2020-04-06

## 2020-03-23 RX ADMIN — Medication 5 MILLIGRAM(S): at 23:57

## 2020-03-23 RX ADMIN — SODIUM CHLORIDE 2000 MILLILITER(S): 9 INJECTION, SOLUTION INTRAVENOUS at 15:33

## 2020-03-23 RX ADMIN — Medication 650 MILLIGRAM(S): at 21:08

## 2020-03-23 RX ADMIN — PIPERACILLIN AND TAZOBACTAM 200 GRAM(S): 4; .5 INJECTION, POWDER, LYOPHILIZED, FOR SOLUTION INTRAVENOUS at 16:05

## 2020-03-23 RX ADMIN — DESMOPRESSIN ACETATE 0.2 MILLIGRAM(S): 0.1 TABLET ORAL at 23:56

## 2020-03-23 RX ADMIN — Medication 10 MILLIGRAM(S): at 23:57

## 2020-03-23 RX ADMIN — Medication 250 MILLIGRAM(S): at 18:00

## 2020-03-23 NOTE — H&P ADULT - PROBLEM SELECTOR PLAN 3
Fever + tachycardia  - 2/2 viral or bacterial PNA most likely  - F/u Bcx, COVID-19 PCR, urine legionella  - S/p Zosyn & Vanc x 1 and 2L LR in ED.  - C/w empirical CTX and AZT as above Fever + tachycardia  - 2/2 viral or bacterial PNA most likely; COVID-19 positive  - F/u Bcx & urine legionella  - S/p Zosyn & Vanc x 1 and 2L LR in ED.  - C/w empirical CTX and AZT as above  - Start COVID-19 treatment

## 2020-03-23 NOTE — H&P ADULT - NSHPPHYSICALEXAM_GEN_ALL_CORE
PHYSICAL EXAM:  GENERAL: NAD, Resting in bed  HEENT:  Head atraumatic, EOMI, PERRLA, conjunctiva and sclera clear; Moist mucous membranes, normal oropharynx  NECK: Supple  CHEST/LUNG: Clear to auscultation bilaterally; No rales or wheezing. Unlabored respirations on 3L O2 via NC, SaO2 95-98%  HEART: Regular rate and rhythm;  ABDOMEN: Bowel sounds present; Soft, Nontender, Nondistended, truncal obesity. No hepatomegaly  EXTREMITIES:  No clubbing, cyanosis, or edema  NERVOUS SYSTEM:  Non-focal; minimally verbal  SKIN: No rashes or lesions

## 2020-03-23 NOTE — ED ADULT NURSE REASSESSMENT NOTE - NS ED NURSE REASSESS COMMENT FT1
pharmacy called for night meds-as per bryan Mcguire non-formulary. gave admitting teams pager to pharmacy to contact. other med to be sent
admitting team being contacted regarding temp

## 2020-03-23 NOTE — ED PROVIDER NOTE - ATTENDING CONTRIBUTION TO CARE
RGUJRAL 29yo male hx Autism presents from group home with cough and and congestion. Pt is a limited historian, Aide w patient. Pt noted to be hypoxic improves w supplemental O2, not in respiratory distress. No sick contacts. Lungs dec bs at bases. +S1S2, Abd soft non tender.  check labs, covid, xray chest.

## 2020-03-23 NOTE — H&P ADULT - ASSESSMENT
30M w/ intellectual disability/Autism with self injurious behavior, HLD, hypothyroidism, GERD, seizure disorder, presents with fever and dry cough x 4 days, and acute worsening of SOB, found to have multifocal PNA, r/o COVID-19.

## 2020-03-23 NOTE — H&P ADULT - HISTORY OF PRESENT ILLNESS
30M w/ intellectual disability/Autism with self injurious behavior, HLD, hypothyroidism, GERD, seizure disorder, presents with fever and dry cough x 4 days, and acute worsening of SOB. Fever max 103.6F while in urgent care this AM. Aide from group home reported patient only went to a birthday party about 1 month ago, and no other sick contacts or travel recently. No known COVID-19 cases in the group home. No other symptoms reported.     Patient is minimally verbal, and unable to answer any questions.

## 2020-03-23 NOTE — H&P ADULT - NSHPLABSRESULTS_GEN_ALL_CORE
12.9   6.73  )-----------( 122      ( 23 Mar 2020 15:49 )             37.1       03-23    133<L>  |  94<L>  |  14  ----------------------------<  100<H>  4.4   |  22  |  0.79    Ca    8.7      23 Mar 2020 15:49    TPro  7.0  /  Alb  3.8  /  TBili  0.3  /  DBili  x   /  AST  21  /  ALT  17  /  AlkPhos  64  03-23            PT/INR - ( 23 Mar 2020 15:49 )   PT: 16.7 sec;   INR: 1.45 ratio         PTT - ( 23 Mar 2020 15:49 )  PTT:33.0 sec      < from: Xray Chest 1 View AP/PA (03.23.20 @ 15:10) >    Impression:    Poor inspiratory effort. The heart is normal in size. Right upper lobe pneumonia is present. Right lower lobe pneumonia cannot be ruled out as well. No acute bony pathology could be identified.    < end of copied text >    < from: CT Chest No Cont (03.23.20 @ 17:17) >    IMPRESSION:    Multifocal pneumonia. Differential includes COVID-19 infection. Correlation with RT-PCR is recommended.    Posterior left 10th and right seventh rib fracture deformities, likely subacute to chronic. Correlate clinically.    < end of copied text >

## 2020-03-23 NOTE — H&P ADULT - PROBLEM SELECTOR PLAN 6
1. Name of PCP: Will obtain info in the AM  2. PCP contacted on admission: [  ] Y   [ x ] N  3. PCP contacted at Discharge: [  ] Y   [  ] N  4. Post-Discharge Appointment Date and Location:   5 Summary of Handoff given to PCP:

## 2020-03-23 NOTE — H&P ADULT - PROBLEM SELECTOR PLAN 2
R/O COVID-19  - F/u COVID-19 PCR  - If hypoxic, escalate as per COVID-19 protocol, no HFNC or BiPAP COVID-19 (+) 3/23    Labs to be checked on admission    -- CBC w/ diff; CMP/Mg/Phos    -- Coags, D-dimer    -- Procalcitonin, lactate    -- ESR, CRP, ferritin    -- LDH, CKs, ABG with lactate; T-cell subset    -- G6PD  - Hydroxychloroquine 400 mg BID x 1 day, then 200 mg BID x 4 days

## 2020-03-23 NOTE — H&P ADULT - NSHPREVIEWOFSYSTEMS_GEN_ALL_CORE
Unable to obtain ROS from patient 2/2 minimally verbal and intellectual disability    Per aide:  REVIEW OF SYSTEMS:    CONSTITUTIONAL: (+) fevers or chills  EYES/ENT: No throat pain   NECK: No pain  RESPIRATORY: (+) cough, (-) wheezing; (+) shortness of breath  CARDIOVASCULAR: No chest pain  GASTROINTESTINAL: No abdominal pain. No vomiting, No diarrhea.   GENITOURINARY: No dysuria  NEUROLOGICAL: No weakness  SKIN: No itching, rashes

## 2020-03-23 NOTE — H&P ADULT - PROBLEM SELECTOR PLAN 1
CT chest revealed multifocal PNA  - CAP vs. COVID-19  - C/w empirical coverage with CTX 1g and  mg x 5 days  - R/o COVID-19  - Wean O2 as tolerated  - Supportive care for fever and cough CT chest revealed multifocal PNA  - COVID-19 vs. superimposed CAP  - C/w empirical coverage with CTX 1g and  mg x 5 days  - COVID-19 (+)  - Wean O2 as tolerated  - Supportive care for fever and cough

## 2020-03-23 NOTE — ED PROVIDER NOTE - PHYSICAL EXAMINATION
Gen: AAO x 3, NAD  Skin: No rashes or lesions, diaphoretic  HEENT: NC/AT, PERRLA, EOMI, dry mucosa  Resp: unlabored, right sided crackles  Cardiac: tachycardic s1s2, no murmurs, rubs or gallops  GI: ND, +BS, Soft, NT  Ext: no pedal edema, FROM in all extremities  Neuro: no focal deficits

## 2020-03-23 NOTE — H&P ADULT - PROBLEM SELECTOR PLAN 4
C/w home meds  - Needs med rec: Unable to reach pharmacy or group home contact after-hour. No med list in chart; Urgent care visit med list without dosage and frequency. C/w home meds

## 2020-03-23 NOTE — H&P ADULT - NSHPSOCIALHISTORY_GEN_ALL_CORE
Lives in group home  Minimally verbal at baseline  Needs assistance for ADL's and daily activities  No ETOH, tobacco use

## 2020-03-23 NOTE — ED ADULT NURSE NOTE - OBJECTIVE STATEMENT
31 yo M presents to ED from urgent care via EMS c/o fever. Patient is awake, alert and follows basic commands, Hx autism. Per caregiver from group home, patient has had fever and cough for 3 days. Patient had temperature of 100.3F but was found to have temperature of 103F at urgent care and was given tylenol. Caregiver denies patient having any sick contacts. Patient arrives on NRB mask, per EMS patient was found to have oxygen saturation in "lows 80s" on room air. Patient found to have oxygen saturation in low 90s in ED, placed on 3L and oxygen saturation improved to high 90s. Nonverbal indicators of pain absent. Skin warm pink and dry. 29 yo M presents to ED from urgent care via EMS c/o fever. Patient is awake, alert and follows basic commands, Hx autism. Per caregiver from group home, patient has had fever and cough for 3 days. Patient had temperature of 100.3F but was found to have temperature of 103F at urgent care and was given tylenol. Caregiver denies patient having any sick contacts. Patient arrives on NRB mask, per EMS patient was found to have oxygen saturation in "lows 80s" on room air. Patient found to have oxygen saturation in low 90s in ED, placed on 3L and oxygen saturation improved to high 90s. Nonverbal indicators of pain absent. Skin warm pink and dry. Comfort and safety measures in place. Bed in lowest position, side rails up. Call bell within reach.

## 2020-03-24 LAB
4/8 RATIO: 3.63 RATIO — HIGH (ref 0.9–3.6)
ABS CD8: 87 /UL — LOW (ref 142–740)
ALBUMIN SERPL ELPH-MCNC: 3.7 G/DL — SIGNIFICANT CHANGE UP (ref 3.3–5)
ALP SERPL-CCNC: 53 U/L — SIGNIFICANT CHANGE UP (ref 40–120)
ALT FLD-CCNC: 18 U/L — SIGNIFICANT CHANGE UP (ref 10–45)
ANION GAP SERPL CALC-SCNC: 12 MMOL/L — SIGNIFICANT CHANGE UP (ref 5–17)
APTT BLD: 31.8 SEC — SIGNIFICANT CHANGE UP (ref 27.5–36.3)
AST SERPL-CCNC: 26 U/L — SIGNIFICANT CHANGE UP (ref 10–40)
BASOPHILS # BLD AUTO: 0.01 K/UL — SIGNIFICANT CHANGE UP (ref 0–0.2)
BASOPHILS NFR BLD AUTO: 0.2 % — SIGNIFICANT CHANGE UP (ref 0–2)
BILIRUB SERPL-MCNC: 0.3 MG/DL — SIGNIFICANT CHANGE UP (ref 0.2–1.2)
BUN SERPL-MCNC: 12 MG/DL — SIGNIFICANT CHANGE UP (ref 7–23)
CALCIUM SERPL-MCNC: 8.7 MG/DL — SIGNIFICANT CHANGE UP (ref 8.4–10.5)
CD3 BLASTS SPEC-ACNC: 414 /UL — LOW (ref 672–1870)
CD3 BLASTS SPEC-ACNC: 57 % — LOW (ref 59–83)
CD4 %: 43 % — SIGNIFICANT CHANGE UP (ref 30–62)
CD8 %: 12 % — SIGNIFICANT CHANGE UP (ref 12–36)
CHLORIDE SERPL-SCNC: 94 MMOL/L — LOW (ref 96–108)
CK SERPL-CCNC: 228 U/L — HIGH (ref 30–200)
CO2 SERPL-SCNC: 25 MMOL/L — SIGNIFICANT CHANGE UP (ref 22–31)
CREAT SERPL-MCNC: 0.75 MG/DL — SIGNIFICANT CHANGE UP (ref 0.5–1.3)
CRP SERPL-MCNC: 18.46 MG/DL — HIGH (ref 0–0.4)
D DIMER BLD IA.RAPID-MCNC: 535 NG/ML DDU — HIGH
EOSINOPHIL # BLD AUTO: 0 K/UL — SIGNIFICANT CHANGE UP (ref 0–0.5)
EOSINOPHIL NFR BLD AUTO: 0 % — SIGNIFICANT CHANGE UP (ref 0–6)
ERYTHROCYTE [SEDIMENTATION RATE] IN BLOOD: 29 MM/HR — HIGH (ref 0–15)
FERRITIN SERPL-MCNC: 504 NG/ML — HIGH (ref 30–400)
GLUCOSE SERPL-MCNC: 88 MG/DL — SIGNIFICANT CHANGE UP (ref 70–99)
HBA1C BLD-MCNC: 5.5 % — SIGNIFICANT CHANGE UP (ref 4–5.6)
HCT VFR BLD CALC: 36 % — LOW (ref 39–50)
HGB BLD-MCNC: 12.2 G/DL — LOW (ref 13–17)
IMM GRANULOCYTES NFR BLD AUTO: 0.7 % — SIGNIFICANT CHANGE UP (ref 0–1.5)
INR BLD: 1.31 RATIO — HIGH (ref 0.88–1.16)
LACTATE BLDV-MCNC: 1.3 MMOL/L — SIGNIFICANT CHANGE UP (ref 0.7–2)
LDH SERPL L TO P-CCNC: 267 U/L — HIGH (ref 50–242)
LYMPHOCYTES # BLD AUTO: 0.87 K/UL — LOW (ref 1–3.3)
LYMPHOCYTES # BLD AUTO: 19.1 % — SIGNIFICANT CHANGE UP (ref 13–44)
MAGNESIUM SERPL-MCNC: 1.8 MG/DL — SIGNIFICANT CHANGE UP (ref 1.6–2.6)
MCHC RBC-ENTMCNC: 30.9 PG — SIGNIFICANT CHANGE UP (ref 27–34)
MCHC RBC-ENTMCNC: 33.9 GM/DL — SIGNIFICANT CHANGE UP (ref 32–36)
MCV RBC AUTO: 91.1 FL — SIGNIFICANT CHANGE UP (ref 80–100)
MONOCYTES # BLD AUTO: 0.68 K/UL — SIGNIFICANT CHANGE UP (ref 0–0.9)
MONOCYTES NFR BLD AUTO: 14.9 % — HIGH (ref 2–14)
NEUTROPHILS # BLD AUTO: 2.97 K/UL — SIGNIFICANT CHANGE UP (ref 1.8–7.4)
NEUTROPHILS NFR BLD AUTO: 65.1 % — SIGNIFICANT CHANGE UP (ref 43–77)
NRBC # BLD: 0 /100 WBCS — SIGNIFICANT CHANGE UP (ref 0–0)
PHOSPHATE SERPL-MCNC: 2.9 MG/DL — SIGNIFICANT CHANGE UP (ref 2.5–4.5)
PLATELET # BLD AUTO: 103 K/UL — LOW (ref 150–400)
POTASSIUM SERPL-MCNC: 4.2 MMOL/L — SIGNIFICANT CHANGE UP (ref 3.5–5.3)
POTASSIUM SERPL-SCNC: 4.2 MMOL/L — SIGNIFICANT CHANGE UP (ref 3.5–5.3)
PROCALCITONIN SERPL-MCNC: 0.45 NG/ML — HIGH (ref 0.02–0.1)
PROT SERPL-MCNC: 6.6 G/DL — SIGNIFICANT CHANGE UP (ref 6–8.3)
PROTHROM AB SERPL-ACNC: 15.2 SEC — HIGH (ref 10–13.1)
RBC # BLD: 3.95 M/UL — LOW (ref 4.2–5.8)
RBC # FLD: 13.1 % — SIGNIFICANT CHANGE UP (ref 10.3–14.5)
SODIUM SERPL-SCNC: 131 MMOL/L — LOW (ref 135–145)
T-CELL CD4 SUBSET PNL BLD: 315 /UL — LOW (ref 489–1457)
TROPONIN T, HIGH SENSITIVITY RESULT: <6 NG/L — SIGNIFICANT CHANGE UP (ref 0–51)
TSH SERPL-MCNC: 1.57 UIU/ML — SIGNIFICANT CHANGE UP (ref 0.27–4.2)
WBC # BLD: 4.56 K/UL — SIGNIFICANT CHANGE UP (ref 3.8–10.5)
WBC # FLD AUTO: 4.56 K/UL — SIGNIFICANT CHANGE UP (ref 3.8–10.5)

## 2020-03-24 PROCEDURE — 93010 ELECTROCARDIOGRAM REPORT: CPT

## 2020-03-24 PROCEDURE — 99233 SBSQ HOSP IP/OBS HIGH 50: CPT | Mod: GC

## 2020-03-24 RX ORDER — HYDROXYCHLOROQUINE SULFATE 200 MG
200 TABLET ORAL EVERY 12 HOURS
Refills: 0 | Status: COMPLETED | OUTPATIENT
Start: 2020-03-25 | End: 2020-03-28

## 2020-03-24 RX ORDER — MAGNESIUM SULFATE 500 MG/ML
2 VIAL (ML) INJECTION ONCE
Refills: 0 | Status: COMPLETED | OUTPATIENT
Start: 2020-03-24 | End: 2020-03-24

## 2020-03-24 RX ADMIN — DIVALPROEX SODIUM 500 MILLIGRAM(S): 500 TABLET, DELAYED RELEASE ORAL at 06:45

## 2020-03-24 RX ADMIN — Medication 400 MILLIGRAM(S): at 17:25

## 2020-03-24 RX ADMIN — Medication 50 MICROGRAM(S): at 06:45

## 2020-03-24 RX ADMIN — OXCARBAZEPINE 600 MILLIGRAM(S): 300 TABLET, FILM COATED ORAL at 06:45

## 2020-03-24 RX ADMIN — OXCARBAZEPINE 1200 MILLIGRAM(S): 300 TABLET, FILM COATED ORAL at 17:25

## 2020-03-24 RX ADMIN — Medication 10 MILLIGRAM(S): at 21:59

## 2020-03-24 RX ADMIN — Medication 5 MILLIGRAM(S): at 21:30

## 2020-03-24 RX ADMIN — DIVALPROEX SODIUM 500 MILLIGRAM(S): 500 TABLET, DELAYED RELEASE ORAL at 17:25

## 2020-03-24 RX ADMIN — Medication 650 MILLIGRAM(S): at 15:39

## 2020-03-24 RX ADMIN — Medication 400 MILLIGRAM(S): at 06:45

## 2020-03-24 RX ADMIN — ENOXAPARIN SODIUM 40 MILLIGRAM(S): 100 INJECTION SUBCUTANEOUS at 11:13

## 2020-03-24 RX ADMIN — Medication 5 MILLIGRAM(S): at 06:45

## 2020-03-24 RX ADMIN — Medication 50 GRAM(S): at 10:11

## 2020-03-24 RX ADMIN — DESMOPRESSIN ACETATE 0.2 MILLIGRAM(S): 0.1 TABLET ORAL at 21:30

## 2020-03-24 RX ADMIN — Medication 650 MILLIGRAM(S): at 14:37

## 2020-03-24 RX ADMIN — Medication 5 MILLIGRAM(S): at 14:00

## 2020-03-24 NOTE — PROGRESS NOTE ADULT - SUBJECTIVE AND OBJECTIVE BOX
PROGRESS NOTE:   Authoted by Dr. Daniella Sanchez MD  Pager [phone] [facility], [id number] J     Patient is a 30y old  Male who presents with a chief complaint of SOB (23 Mar 2020 19:14)      SUBJECTIVE / OVERNIGHT EVENTS: Continued to spike to 103.2F last night, with oral T 99.9F this AM. O2 weaned down to 2L via NC, SaO2 well.     Unable to obtain subjective or ROS 2/2 minimally-verbal baseline    MEDICATIONS  (STANDING):  desmopressin 0.2 milliGRAM(s) Oral at bedtime  diVALproex  milliGRAM(s) Oral <User Schedule>  enoxaparin Injectable 40 milliGRAM(s) SubCutaneous daily  hydroxychloroquine 400 milliGRAM(s) Oral every 12 hours  hydroxychloroquine   Oral   levothyroxine 50 MICROGram(s) Oral daily  OXcarbazepine 600 milliGRAM(s) Oral <User Schedule>  OXcarbazepine 1200 milliGRAM(s) Oral <User Schedule>  oxybutynin 5 milliGRAM(s) Oral three times a day  thiothixene 10 milliGRAM(s) Oral <User Schedule>    MEDICATIONS  (PRN):  acetaminophen   Tablet .. 650 milliGRAM(s) Oral every 6 hours PRN Temp greater or equal to 38C (100.4F)  diazepam    Tablet 10 milliGRAM(s) Oral daily PRN for anxiety      CAPILLARY BLOOD GLUCOSE        I&O's Summary      PHYSICAL EXAM:  Vital Signs Last 24 Hrs  T(C): 37.5 (24 Mar 2020 10:05), Max: 39.9 (23 Mar 2020 20:20)  T(F): 99.5 (24 Mar 2020 10:05), Max: 103.8 (23 Mar 2020 20:20)  HR: 103 (24 Mar 2020 10:05) (80 - 135)  BP: 120/69 (24 Mar 2020 10:05) (113/65 - 155/55)  BP(mean): --  RR: 20 (24 Mar 2020 10:05) (20 - 30)  SpO2: 97% (24 Mar 2020 10:05) (93% - 100%)    CONSTITUTIONAL: NAD, well-developed  RESPIRATORY: Normal respiratory effort; lungs are clear to auscultation bilaterally  CARDIOVASCULAR: Regular rate and rhythm, normal S1 and S2; No lower extremity edema;  ABDOMEN: Nontender to palpation, normoactive bowel sounds, no rebound/guarding; No hepatosplenomegaly  MUSCULOSKELETAL no clubbing or cyanosis of digits; no joint swelling or tenderness to palpation  PSYCH: A+O x 0; Minimally verbal baseline  NEURO: Non-focal, no tremors  SKIN: No rashes    LABS:                        12.2   4.56  )-----------( 103      ( 24 Mar 2020 07:17 )             36.0     03-24    131<L>  |  94<L>  |  12  ----------------------------<  88  4.2   |  25  |  0.75    Ca    8.7      24 Mar 2020 07:09  Phos  2.9     03-24  Mg     1.8     03-24    TPro  6.6  /  Alb  3.7  /  TBili  0.3  /  DBili  x   /  AST  26  /  ALT  18  /  AlkPhos  53  03-24    PT/INR - ( 24 Mar 2020 08:43 )   PT: 15.2 sec;   INR: 1.31 ratio         PTT - ( 24 Mar 2020 08:43 )  PTT:31.8 sec  CARDIAC MARKERS ( 24 Mar 2020 07:09 )  x     / x     / 228 U/L / x     / x          RADIOLOGY & ADDITIONAL TESTS:  No new imaging or tests    COORDINATION OF CARE:  Care Discussed with Consultants/Other Providers [Y/N]: N/A  Prior or Outpatient Records Reviewed [Y/N]: Urgent care documentation.

## 2020-03-24 NOTE — PROGRESS NOTE ADULT - PROBLEM SELECTOR PLAN 3
Fever + tachycardia  - COVID-19 positive  - F/u Bcx & urine legionella  - S/p Zosyn & Vanc x 1 and 2L LR in ED.  - Start COVID-19 treatment

## 2020-03-24 NOTE — PROGRESS NOTE ADULT - PROBLEM SELECTOR PLAN 2
CT chest revealed multifocal PNA  - Likely 2/2 COVID-19  - S/p vanc and Zosyn x 1 in ED; not started on AZT and CTX last night.   - F/u ID regarding utility of AZT  - Wean O2 as tolerated  - Supportive care for fever and cough

## 2020-03-24 NOTE — PROGRESS NOTE ADULT - ASSESSMENT
30M w/ intellectual disability/Autism with self injurious behavior, HLD, hypothyroidism, GERD, seizure disorder, presents with fever and dry cough x 4 days, and acute worsening of SOB, found to have multifocal PNA 2/2 COVID-19 (positive at 3/23).

## 2020-03-24 NOTE — PROGRESS NOTE ADULT - PROBLEM SELECTOR PLAN 1
COVID-19 (+) 3/23  - Hydroxychloroquine 400 mg BID x 1 day, then 200 mg BID x 4 days  - Will f/u with ID regarding utility of AZT;  - Procalcitonin 0.45, , D-dimer 535, trop < 6, ESR 29; F/u the rest of the labs.  - Will check daily CBC w/ diff, CMP/Mg/phos, CRP, lactate, and EKG for QTc  - Will trend every 3 days: ESR, LDH, ferritin, coags, CK/trop, d-dimer and T-cell subset.   - Wean O2 as tolerated

## 2020-03-25 LAB
ALBUMIN SERPL ELPH-MCNC: 3.1 G/DL — LOW (ref 3.3–5)
ALP SERPL-CCNC: 47 U/L — SIGNIFICANT CHANGE UP (ref 40–120)
ALT FLD-CCNC: 26 U/L — SIGNIFICANT CHANGE UP (ref 10–45)
ANION GAP SERPL CALC-SCNC: 14 MMOL/L — SIGNIFICANT CHANGE UP (ref 5–17)
AST SERPL-CCNC: 48 U/L — HIGH (ref 10–40)
BILIRUB SERPL-MCNC: 0.2 MG/DL — SIGNIFICANT CHANGE UP (ref 0.2–1.2)
BUN SERPL-MCNC: 13 MG/DL — SIGNIFICANT CHANGE UP (ref 7–23)
CALCIUM SERPL-MCNC: 8.4 MG/DL — SIGNIFICANT CHANGE UP (ref 8.4–10.5)
CHLORIDE SERPL-SCNC: 95 MMOL/L — LOW (ref 96–108)
CO2 SERPL-SCNC: 24 MMOL/L — SIGNIFICANT CHANGE UP (ref 22–31)
CREAT SERPL-MCNC: 0.76 MG/DL — SIGNIFICANT CHANGE UP (ref 0.5–1.3)
CRP SERPL-MCNC: 19.17 MG/DL — HIGH (ref 0–0.4)
GLUCOSE SERPL-MCNC: 81 MG/DL — SIGNIFICANT CHANGE UP (ref 70–99)
HCT VFR BLD CALC: 36 % — LOW (ref 39–50)
HGB BLD-MCNC: 12.1 G/DL — LOW (ref 13–17)
LACTATE BLDV-MCNC: 1.4 MMOL/L — SIGNIFICANT CHANGE UP (ref 0.7–2)
MAGNESIUM SERPL-MCNC: 2.2 MG/DL — SIGNIFICANT CHANGE UP (ref 1.6–2.6)
MCHC RBC-ENTMCNC: 30.6 PG — SIGNIFICANT CHANGE UP (ref 27–34)
MCHC RBC-ENTMCNC: 33.6 GM/DL — SIGNIFICANT CHANGE UP (ref 32–36)
MCV RBC AUTO: 90.9 FL — SIGNIFICANT CHANGE UP (ref 80–100)
NRBC # BLD: 0 /100 WBCS — SIGNIFICANT CHANGE UP (ref 0–0)
PHOSPHATE SERPL-MCNC: 3.6 MG/DL — SIGNIFICANT CHANGE UP (ref 2.5–4.5)
PLATELET # BLD AUTO: 100 K/UL — LOW (ref 150–400)
POTASSIUM SERPL-MCNC: 4.3 MMOL/L — SIGNIFICANT CHANGE UP (ref 3.5–5.3)
POTASSIUM SERPL-SCNC: 4.3 MMOL/L — SIGNIFICANT CHANGE UP (ref 3.5–5.3)
PROT SERPL-MCNC: 6.2 G/DL — SIGNIFICANT CHANGE UP (ref 6–8.3)
RBC # BLD: 3.96 M/UL — LOW (ref 4.2–5.8)
RBC # FLD: 13 % — SIGNIFICANT CHANGE UP (ref 10.3–14.5)
SODIUM SERPL-SCNC: 133 MMOL/L — LOW (ref 135–145)
WBC # BLD: 3.1 K/UL — LOW (ref 3.8–10.5)
WBC # FLD AUTO: 3.1 K/UL — LOW (ref 3.8–10.5)

## 2020-03-25 PROCEDURE — 99233 SBSQ HOSP IP/OBS HIGH 50: CPT | Mod: GC

## 2020-03-25 PROCEDURE — 93010 ELECTROCARDIOGRAM REPORT: CPT

## 2020-03-25 RX ADMIN — Medication 200 MILLIGRAM(S): at 05:30

## 2020-03-25 RX ADMIN — DESMOPRESSIN ACETATE 0.2 MILLIGRAM(S): 0.1 TABLET ORAL at 22:15

## 2020-03-25 RX ADMIN — Medication 10 MILLIGRAM(S): at 21:15

## 2020-03-25 RX ADMIN — DIVALPROEX SODIUM 500 MILLIGRAM(S): 500 TABLET, DELAYED RELEASE ORAL at 17:00

## 2020-03-25 RX ADMIN — ENOXAPARIN SODIUM 40 MILLIGRAM(S): 100 INJECTION SUBCUTANEOUS at 11:21

## 2020-03-25 RX ADMIN — DIVALPROEX SODIUM 500 MILLIGRAM(S): 500 TABLET, DELAYED RELEASE ORAL at 05:51

## 2020-03-25 RX ADMIN — Medication 5 MILLIGRAM(S): at 22:15

## 2020-03-25 RX ADMIN — Medication 50 MICROGRAM(S): at 05:30

## 2020-03-25 RX ADMIN — Medication 5 MILLIGRAM(S): at 05:30

## 2020-03-25 RX ADMIN — Medication 5 MILLIGRAM(S): at 11:21

## 2020-03-25 RX ADMIN — Medication 200 MILLIGRAM(S): at 17:00

## 2020-03-25 RX ADMIN — OXCARBAZEPINE 1200 MILLIGRAM(S): 300 TABLET, FILM COATED ORAL at 17:01

## 2020-03-25 RX ADMIN — OXCARBAZEPINE 600 MILLIGRAM(S): 300 TABLET, FILM COATED ORAL at 05:51

## 2020-03-25 NOTE — PROGRESS NOTE ADULT - PROBLEM SELECTOR PLAN 2
CT chest revealed multifocal PNA  - Likely 2/2 COVID-19  - S/p vanc and Zosyn x 1 in ED; not started on AZT and CTX last night.   - F/u ID regarding utility of AZT  - Wean O2 as tolerated  - Supportive care for fever and cough CT chest revealed multifocal PNA  - Likely 2/2 COVID-19  - S/p vanc and Zosyn x 1 in ED  - Off abx now, only on hydroxychloroquine  - Wean O2 as tolerated  - Supportive care for fever and cough

## 2020-03-25 NOTE — PROGRESS NOTE ADULT - PROBLEM SELECTOR PLAN 1
COVID-19 (+) 3/23  - Hydroxychloroquine 400 mg BID x 1 day, then 200 mg BID x 4 days  - Will f/u with ID regarding utility of AZT;  - Procalcitonin 0.45, , D-dimer 535, trop < 6, ESR 29; F/u the rest of the labs.  - Will check daily CBC w/ diff, CMP/Mg/phos, CRP, lactate, and EKG for QTc  - Will trend every 3 days: ESR, LDH, ferritin, coags, CK/trop, d-dimer and T-cell subset.   - Wean O2 as tolerated COVID-19 (+) 3/23  - Hydroxychloroquine 400 mg BID x 1 day, then 200 mg BID x 4 days (3/23 - )  - AZT not required per ID.   - Will check daily CBC w/ diff, CMP/Mg/phos, CRP, lactate, and EKG for QTc  - Will trend every 3 days: ESR, LDH, ferritin, coags, CK/trop, d-dimer and T-cell subset.   - Wean O2 as tolerated

## 2020-03-25 NOTE — PROGRESS NOTE ADULT - PROBLEM SELECTOR PLAN 3
Fever + tachycardia  - COVID-19 positive  - F/u Bcx & urine legionella  - S/p Zosyn & Vanc x 1 and 2L LR in ED.  - Start COVID-19 treatment Fever + tachycardia  - COVID-19 positive  - Bcx 3/23 NGTD, Ucx and urine legionella not sent  - S/p Zosyn & Vanc x 1 and 2L LR in ED.  - C/w COVID-19 treatment

## 2020-03-25 NOTE — PROGRESS NOTE ADULT - SUBJECTIVE AND OBJECTIVE BOX
PROGRESS NOTE:   Authoted by Dr. Daniella Sanchez MD  Pager [phone] [facility], [id number] [facility]     Patient is a 30y old  Male who presents with a chief complaint of SOB (24 Mar 2020 10:39)      SUBJECTIVE / OVERNIGHT EVENTS:     REVIEW OF SYSTEMS:    CONSTITUTIONAL: No weakness, fevers or chills  EYES/ENT: No visual changes;  No vertigo or throat pain   NECK: No pain or stiffness  RESPIRATORY: No cough, wheezing, hemoptysis; No shortness of breath  CARDIOVASCULAR: No chest pain or palpitations  GASTROINTESTINAL: No abdominal or epigastric pain. No nausea, vomiting, or hematemesis; No diarrhea or constipation. No melena or hematochezia.  GENITOURINARY: No dysuria, frequency or hematuria  NEUROLOGICAL: No numbness or weakness  SKIN: No itching, rashes    MEDICATIONS  (STANDING):  desmopressin 0.2 milliGRAM(s) Oral at bedtime  diVALproex  milliGRAM(s) Oral <User Schedule>  enoxaparin Injectable 40 milliGRAM(s) SubCutaneous daily  hydroxychloroquine 200 milliGRAM(s) Oral every 12 hours  hydroxychloroquine   Oral   levothyroxine 50 MICROGram(s) Oral daily  OXcarbazepine 600 milliGRAM(s) Oral <User Schedule>  OXcarbazepine 1200 milliGRAM(s) Oral <User Schedule>  oxybutynin 5 milliGRAM(s) Oral three times a day  thiothixene 10 milliGRAM(s) Oral <User Schedule>    MEDICATIONS  (PRN):  acetaminophen   Tablet .. 650 milliGRAM(s) Oral every 6 hours PRN Temp greater or equal to 38C (100.4F)  diazepam    Tablet 10 milliGRAM(s) Oral daily PRN for anxiety      CAPILLARY BLOOD GLUCOSE        I&O's Summary    24 Mar 2020 07:01  -  25 Mar 2020 06:47  --------------------------------------------------------  IN: 320 mL / OUT: 0 mL / NET: 320 mL        PHYSICAL EXAM:  Vital Signs Last 24 Hrs  T(C): 37.8 (25 Mar 2020 04:43), Max: 39.1 (24 Mar 2020 14:39)  T(F): 100 (25 Mar 2020 04:43), Max: 102.4 (24 Mar 2020 14:39)  HR: 94 (25 Mar 2020 04:43) (94 - 107)  BP: 100/63 (25 Mar 2020 04:43) (100/63 - 120/69)  BP(mean): --  RR: 20 (25 Mar 2020 04:43) (20 - 20)  SpO2: 92% (25 Mar 2020 04:43) (92% - 97%)    CONSTITUTIONAL: NAD, well-developed  RESPIRATORY: Normal respiratory effort; lungs are clear to auscultation bilaterally  CARDIOVASCULAR: Regular rate and rhythm, normal S1 and S2, no murmur/rub/gallop; No lower extremity edema; Peripheral pulses are 2+ bilaterally  ABDOMEN: Nontender to palpation, normoactive bowel sounds, no rebound/guarding; No hepatosplenomegaly  MUSCLOSKELETAL: no clubbing or cyanosis of digits; no joint swelling or tenderness to palpation  PSYCH: A+O to person, place, and time; affect appropriate  NEURO: Non-focal, no tremors  SKIN: No rashes    LABS:                        12.2   4.56  )-----------( 103      ( 24 Mar 2020 07:17 )             36.0     03-24    131<L>  |  94<L>  |  12  ----------------------------<  88  4.2   |  25  |  0.75    Ca    8.7      24 Mar 2020 07:09  Phos  2.9     03-24  Mg     1.8     03-24    TPro  6.6  /  Alb  3.7  /  TBili  0.3  /  DBili  x   /  AST  26  /  ALT  18  /  AlkPhos  53  03-24    PT/INR - ( 24 Mar 2020 08:43 )   PT: 15.2 sec;   INR: 1.31 ratio         PTT - ( 24 Mar 2020 08:43 )  PTT:31.8 sec  CARDIAC MARKERS ( 24 Mar 2020 07:09 )  x     / x     / 228 U/L / x     / x              Culture - Blood (collected 23 Mar 2020 18:26)  Source: .Blood Blood-Peripheral  Preliminary Report (24 Mar 2020 19:02):    No growth to date.    Culture - Blood (collected 23 Mar 2020 18:26)  Source: .Blood Blood-Peripheral  Preliminary Report (24 Mar 2020 19:02):    No growth to date.        RADIOLOGY & ADDITIONAL TESTS:  No new imaging or tests    COORDINATION OF CARE:  Care Discussed with Consultants/Other Providers [Y/N]:  Prior or Outpatient Records Reviewed [Y/N]: PROGRESS NOTE:   Authoted by Dr. Daniella Sanchez MD  Pager [phone] [facility], [id number] [facility]     Patient is a 30y old  Male who presents with a chief complaint of SOB (24 Mar 2020 10:39)      SUBJECTIVE / OVERNIGHT EVENTS: Desat on 2L O2 overnight, and O2 increased to 6L. However, patient pulled the NC off during the desat. Currently still on 6L O2 via NC satting ~ 95%. Sleeping.     REVIEW OF SYSTEMS:  Unable to obtain detailed ROS 2/2 baseline minimally verbal.     MEDICATIONS  (STANDING):  desmopressin 0.2 milliGRAM(s) Oral at bedtime  diVALproex  milliGRAM(s) Oral <User Schedule>  enoxaparin Injectable 40 milliGRAM(s) SubCutaneous daily  hydroxychloroquine 200 milliGRAM(s) Oral every 12 hours  hydroxychloroquine   Oral   levothyroxine 50 MICROGram(s) Oral daily  OXcarbazepine 600 milliGRAM(s) Oral <User Schedule>  OXcarbazepine 1200 milliGRAM(s) Oral <User Schedule>  oxybutynin 5 milliGRAM(s) Oral three times a day  thiothixene 10 milliGRAM(s) Oral <User Schedule>    MEDICATIONS  (PRN):  acetaminophen   Tablet .. 650 milliGRAM(s) Oral every 6 hours PRN Temp greater or equal to 38C (100.4F)  diazepam    Tablet 10 milliGRAM(s) Oral daily PRN for anxiety      CAPILLARY BLOOD GLUCOSE        I&O's Summary    24 Mar 2020 07:01  -  25 Mar 2020 06:47  --------------------------------------------------------  IN: 320 mL / OUT: 0 mL / NET: 320 mL        PHYSICAL EXAM:  Vital Signs Last 24 Hrs  T(C): 37.8 (25 Mar 2020 04:43), Max: 39.1 (24 Mar 2020 14:39)  T(F): 100 (25 Mar 2020 04:43), Max: 102.4 (24 Mar 2020 14:39)  HR: 94 (25 Mar 2020 04:43) (94 - 107)  BP: 100/63 (25 Mar 2020 04:43) (100/63 - 120/69)  BP(mean): --  RR: 20 (25 Mar 2020 04:43) (20 - 20)  SpO2: 92% (25 Mar 2020 04:43) (92% - 97%)    CONSTITUTIONAL: NAD, well-developed, sleeping  RESPIRATORY: Normal respiratory effort on 6L O2; lungs are clear to auscultation bilaterally  CARDIOVASCULAR: Regular rate and rhythm, normal S1 and S2; No lower extremity edema;  ABDOMEN: Nontender to palpation, normoactive bowel sounds, no rebound/guarding; No hepatosplenomegaly  MUSCULOSKELETAL no clubbing or cyanosis of digits; no joint swelling or tenderness to palpation  PSYCH: A+O x 0; Minimally verbal baseline  NEURO: Non-focal, no tremors  SKIN: No rashes    LABS:                        12.2   4.56  )-----------( 103      ( 24 Mar 2020 07:17 )             36.0     03-24    131<L>  |  94<L>  |  12  ----------------------------<  88  4.2   |  25  |  0.75    Ca    8.7      24 Mar 2020 07:09  Phos  2.9     03-24  Mg     1.8     03-24    TPro  6.6  /  Alb  3.7  /  TBili  0.3  /  DBili  x   /  AST  26  /  ALT  18  /  AlkPhos  53  03-24    PT/INR - ( 24 Mar 2020 08:43 )   PT: 15.2 sec;   INR: 1.31 ratio         PTT - ( 24 Mar 2020 08:43 )  PTT:31.8 sec  CARDIAC MARKERS ( 24 Mar 2020 07:09 )  x     / x     / 228 U/L / x     / x              Culture - Blood (collected 23 Mar 2020 18:26)  Source: .Blood Blood-Peripheral  Preliminary Report (24 Mar 2020 19:02):    No growth to date.    Culture - Blood (collected 23 Mar 2020 18:26)  Source: .Blood Blood-Peripheral  Preliminary Report (24 Mar 2020 19:02):    No growth to date.        RADIOLOGY & ADDITIONAL TESTS:  No new imaging or tests    COORDINATION OF CARE:  Care Discussed with Consultants/Other Providers [Y/N]: N/A  Prior or Outpatient Records Reviewed [Y/N]: N/A

## 2020-03-26 ENCOUNTER — TRANSCRIPTION ENCOUNTER (OUTPATIENT)
Age: 30
End: 2020-03-26

## 2020-03-26 LAB
ALBUMIN SERPL ELPH-MCNC: 3.1 G/DL — LOW (ref 3.3–5)
ALP SERPL-CCNC: 49 U/L — SIGNIFICANT CHANGE UP (ref 40–120)
ALT FLD-CCNC: 32 U/L — SIGNIFICANT CHANGE UP (ref 10–45)
ANION GAP SERPL CALC-SCNC: 13 MMOL/L — SIGNIFICANT CHANGE UP (ref 5–17)
AST SERPL-CCNC: 58 U/L — HIGH (ref 10–40)
BASOPHILS # BLD AUTO: 0.02 K/UL — SIGNIFICANT CHANGE UP (ref 0–0.2)
BASOPHILS NFR BLD AUTO: 0.5 % — SIGNIFICANT CHANGE UP (ref 0–2)
BILIRUB SERPL-MCNC: 0.2 MG/DL — SIGNIFICANT CHANGE UP (ref 0.2–1.2)
BUN SERPL-MCNC: 16 MG/DL — SIGNIFICANT CHANGE UP (ref 7–23)
CALCIUM SERPL-MCNC: 8.3 MG/DL — LOW (ref 8.4–10.5)
CHLORIDE SERPL-SCNC: 97 MMOL/L — SIGNIFICANT CHANGE UP (ref 96–108)
CO2 SERPL-SCNC: 25 MMOL/L — SIGNIFICANT CHANGE UP (ref 22–31)
CREAT SERPL-MCNC: 0.7 MG/DL — SIGNIFICANT CHANGE UP (ref 0.5–1.3)
EOSINOPHIL # BLD AUTO: 0 K/UL — SIGNIFICANT CHANGE UP (ref 0–0.5)
EOSINOPHIL NFR BLD AUTO: 0 % — SIGNIFICANT CHANGE UP (ref 0–6)
GLUCOSE SERPL-MCNC: 74 MG/DL — SIGNIFICANT CHANGE UP (ref 70–99)
HCT VFR BLD CALC: 37 % — LOW (ref 39–50)
HGB BLD-MCNC: 12.7 G/DL — LOW (ref 13–17)
IMM GRANULOCYTES NFR BLD AUTO: 1.1 % — SIGNIFICANT CHANGE UP (ref 0–1.5)
LYMPHOCYTES # BLD AUTO: 1.55 K/UL — SIGNIFICANT CHANGE UP (ref 1–3.3)
LYMPHOCYTES # BLD AUTO: 34.9 % — SIGNIFICANT CHANGE UP (ref 13–44)
MAGNESIUM SERPL-MCNC: 2.1 MG/DL — SIGNIFICANT CHANGE UP (ref 1.6–2.6)
MCHC RBC-ENTMCNC: 31.3 PG — SIGNIFICANT CHANGE UP (ref 27–34)
MCHC RBC-ENTMCNC: 34.3 GM/DL — SIGNIFICANT CHANGE UP (ref 32–36)
MCV RBC AUTO: 91.1 FL — SIGNIFICANT CHANGE UP (ref 80–100)
MONOCYTES # BLD AUTO: 0.63 K/UL — SIGNIFICANT CHANGE UP (ref 0–0.9)
MONOCYTES NFR BLD AUTO: 14.2 % — HIGH (ref 2–14)
NEUTROPHILS # BLD AUTO: 2.19 K/UL — SIGNIFICANT CHANGE UP (ref 1.8–7.4)
NEUTROPHILS NFR BLD AUTO: 49.3 % — SIGNIFICANT CHANGE UP (ref 43–77)
NRBC # BLD: 0 /100 WBCS — SIGNIFICANT CHANGE UP (ref 0–0)
PLATELET # BLD AUTO: 120 K/UL — LOW (ref 150–400)
POTASSIUM SERPL-MCNC: 4.3 MMOL/L — SIGNIFICANT CHANGE UP (ref 3.5–5.3)
POTASSIUM SERPL-SCNC: 4.3 MMOL/L — SIGNIFICANT CHANGE UP (ref 3.5–5.3)
PROT SERPL-MCNC: 6.5 G/DL — SIGNIFICANT CHANGE UP (ref 6–8.3)
RBC # BLD: 4.06 M/UL — LOW (ref 4.2–5.8)
RBC # FLD: 12.8 % — SIGNIFICANT CHANGE UP (ref 10.3–14.5)
SODIUM SERPL-SCNC: 135 MMOL/L — SIGNIFICANT CHANGE UP (ref 135–145)
WBC # BLD: 4.44 K/UL — SIGNIFICANT CHANGE UP (ref 3.8–10.5)
WBC # FLD AUTO: 4.44 K/UL — SIGNIFICANT CHANGE UP (ref 3.8–10.5)

## 2020-03-26 PROCEDURE — 93010 ELECTROCARDIOGRAM REPORT: CPT

## 2020-03-26 PROCEDURE — 99233 SBSQ HOSP IP/OBS HIGH 50: CPT | Mod: GC

## 2020-03-26 RX ORDER — MAGNESIUM OXIDE 400 MG ORAL TABLET 241.3 MG
400 TABLET ORAL
Refills: 0 | Status: COMPLETED | OUTPATIENT
Start: 2020-03-26 | End: 2020-03-26

## 2020-03-26 RX ADMIN — Medication 200 MILLIGRAM(S): at 17:53

## 2020-03-26 RX ADMIN — Medication 5 MILLIGRAM(S): at 06:43

## 2020-03-26 RX ADMIN — ENOXAPARIN SODIUM 40 MILLIGRAM(S): 100 INJECTION SUBCUTANEOUS at 11:23

## 2020-03-26 RX ADMIN — DESMOPRESSIN ACETATE 0.2 MILLIGRAM(S): 0.1 TABLET ORAL at 21:14

## 2020-03-26 RX ADMIN — OXCARBAZEPINE 600 MILLIGRAM(S): 300 TABLET, FILM COATED ORAL at 06:43

## 2020-03-26 RX ADMIN — Medication 200 MILLIGRAM(S): at 06:42

## 2020-03-26 RX ADMIN — DIVALPROEX SODIUM 500 MILLIGRAM(S): 500 TABLET, DELAYED RELEASE ORAL at 17:53

## 2020-03-26 RX ADMIN — MAGNESIUM OXIDE 400 MG ORAL TABLET 400 MILLIGRAM(S): 241.3 TABLET ORAL at 17:53

## 2020-03-26 RX ADMIN — Medication 10 MILLIGRAM(S): at 21:15

## 2020-03-26 RX ADMIN — Medication 50 MICROGRAM(S): at 06:42

## 2020-03-26 RX ADMIN — OXCARBAZEPINE 1200 MILLIGRAM(S): 300 TABLET, FILM COATED ORAL at 17:53

## 2020-03-26 RX ADMIN — DIVALPROEX SODIUM 500 MILLIGRAM(S): 500 TABLET, DELAYED RELEASE ORAL at 06:42

## 2020-03-26 RX ADMIN — Medication 5 MILLIGRAM(S): at 13:01

## 2020-03-26 RX ADMIN — MAGNESIUM OXIDE 400 MG ORAL TABLET 400 MILLIGRAM(S): 241.3 TABLET ORAL at 09:51

## 2020-03-26 RX ADMIN — Medication 5 MILLIGRAM(S): at 21:14

## 2020-03-26 NOTE — PROGRESS NOTE ADULT - SUBJECTIVE AND OBJECTIVE BOX
PROGRESS NOTE:   Authoted by Dr. Daniella Sanchez MD  Pager [phone] [facility], [id number] [facility]     Patient is a 30y old  Male who presents with a chief complaint of SOB (25 Mar 2020 06:47)      SUBJECTIVE / OVERNIGHT EVENTS: No acute events overnight. Patient was still on 6L O2 via NC with sat > 95%.     REVIEW OF SYSTEMS:    Unable to obtain detailed ROS 2/2 minimally verbal at baseline    MEDICATIONS  (STANDING):  desmopressin 0.2 milliGRAM(s) Oral at bedtime  diVALproex  milliGRAM(s) Oral <User Schedule>  enoxaparin Injectable 40 milliGRAM(s) SubCutaneous daily  hydroxychloroquine 200 milliGRAM(s) Oral every 12 hours  hydroxychloroquine   Oral   levothyroxine 50 MICROGram(s) Oral daily  OXcarbazepine 600 milliGRAM(s) Oral <User Schedule>  OXcarbazepine 1200 milliGRAM(s) Oral <User Schedule>  oxybutynin 5 milliGRAM(s) Oral three times a day  thiothixene 10 milliGRAM(s) Oral <User Schedule>    MEDICATIONS  (PRN):  acetaminophen   Tablet .. 650 milliGRAM(s) Oral every 6 hours PRN Temp greater or equal to 38C (100.4F)  diazepam    Tablet 10 milliGRAM(s) Oral daily PRN for anxiety      CAPILLARY BLOOD GLUCOSE      I&O's Summary    24 Mar 2020 07:01  -  25 Mar 2020 07:00  --------------------------------------------------------  IN: 320 mL / OUT: 0 mL / NET: 320 mL    25 Mar 2020 07:01  -  26 Mar 2020 06:51  --------------------------------------------------------  IN: 360 mL / OUT: 0 mL / NET: 360 mL        PHYSICAL EXAM:  Vital Signs Last 24 Hrs  T(C): 37.2 (25 Mar 2020 21:15), Max: 37.9 (25 Mar 2020 09:13)  T(F): 98.9 (25 Mar 2020 21:15), Max: 100.2 (25 Mar 2020 09:13)  HR: 88 (25 Mar 2020 21:15) (88 - 98)  BP: 99/62 (25 Mar 2020 21:15) (99/62 - 102/64)  BP(mean): --  RR: 20 (25 Mar 2020 21:15) (20 - 20)  SpO2: 96% (25 Mar 2020 21:15) (92% - 97%)    CONSTITUTIONAL: NAD, well-developed  RESPIRATORY: Normal respiratory effort on 6L O2; lungs are clear to auscultation bilaterally  CARDIOVASCULAR: Regular rate and rhythm, normal S1 and S2; No lower extremity edema;  ABDOMEN: Nontender to palpation, normoactive bowel sounds, no rebound/guarding; No hepatosplenomegaly  MUSCULOSKELETAL no clubbing or cyanosis of digits; no joint swelling or tenderness to palpation  PSYCH: A+O x 0; Minimally verbal baseline  NEURO: Non-focal, no tremors  SKIN: No rashes    LABS:                        12.1   3.10  )-----------( 100      ( 25 Mar 2020 06:42 )             36.0     03-25    133<L>  |  95<L>  |  13  ----------------------------<  81  4.3   |  24  |  0.76    Ca    8.4      25 Mar 2020 06:42  Phos  3.6     03-25  Mg     2.2     03-25    TPro  6.2  /  Alb  3.1<L>  /  TBili  0.2  /  DBili  x   /  AST  48<H>  /  ALT  26  /  AlkPhos  47  03-25    PT/INR - ( 24 Mar 2020 08:43 )   PT: 15.2 sec;   INR: 1.31 ratio         PTT - ( 24 Mar 2020 08:43 )  PTT:31.8 sec  CARDIAC MARKERS ( 24 Mar 2020 07:09 )  x     / x     / 228 U/L / x     / x              Culture - Blood (collected 23 Mar 2020 18:26)  Source: .Blood Blood-Peripheral  Preliminary Report (24 Mar 2020 19:02):    No growth to date.    Culture - Blood (collected 23 Mar 2020 18:26)  Source: .Blood Blood-Peripheral  Preliminary Report (24 Mar 2020 19:02):    No growth to date.        RADIOLOGY & ADDITIONAL TESTS:  No new imaging or tests    COORDINATION OF CARE:  Care Discussed with Consultants/Other Providers [Y/N]:  Prior or Outpatient Records Reviewed [Y/N]: PROGRESS NOTE:   Authoted by Dr. Daniella Sanchez MD  Pager [phone] [facility], [id number] [facility]     Patient is a 30y old  Male who presents with a chief complaint of SOB (25 Mar 2020 06:47)      SUBJECTIVE / OVERNIGHT EVENTS: No acute events overnight. Patient was still on 6L O2 via NC with sat 93-97%     REVIEW OF SYSTEMS:    Unable to obtain detailed ROS 2/2 minimally verbal at baseline    MEDICATIONS  (STANDING):  desmopressin 0.2 milliGRAM(s) Oral at bedtime  diVALproex  milliGRAM(s) Oral <User Schedule>  enoxaparin Injectable 40 milliGRAM(s) SubCutaneous daily  hydroxychloroquine 200 milliGRAM(s) Oral every 12 hours  hydroxychloroquine   Oral   levothyroxine 50 MICROGram(s) Oral daily  OXcarbazepine 600 milliGRAM(s) Oral <User Schedule>  OXcarbazepine 1200 milliGRAM(s) Oral <User Schedule>  oxybutynin 5 milliGRAM(s) Oral three times a day  thiothixene 10 milliGRAM(s) Oral <User Schedule>    MEDICATIONS  (PRN):  acetaminophen   Tablet .. 650 milliGRAM(s) Oral every 6 hours PRN Temp greater or equal to 38C (100.4F)  diazepam    Tablet 10 milliGRAM(s) Oral daily PRN for anxiety      CAPILLARY BLOOD GLUCOSE      I&O's Summary    24 Mar 2020 07:01  -  25 Mar 2020 07:00  --------------------------------------------------------  IN: 320 mL / OUT: 0 mL / NET: 320 mL    25 Mar 2020 07:01  -  26 Mar 2020 06:51  --------------------------------------------------------  IN: 360 mL / OUT: 0 mL / NET: 360 mL        PHYSICAL EXAM:  Vital Signs Last 24 Hrs  T(C): 37.2 (25 Mar 2020 21:15), Max: 37.9 (25 Mar 2020 09:13)  T(F): 98.9 (25 Mar 2020 21:15), Max: 100.2 (25 Mar 2020 09:13)  HR: 88 (25 Mar 2020 21:15) (88 - 98)  BP: 99/62 (25 Mar 2020 21:15) (99/62 - 102/64)  BP(mean): --  RR: 20 (25 Mar 2020 21:15) (20 - 20)  SpO2: 96% (25 Mar 2020 21:15) (92% - 97%)    CONSTITUTIONAL: NAD, well-developed  RESPIRATORY: Normal respiratory effort on 6L O2; lungs are clear to auscultation bilaterally  CARDIOVASCULAR: Regular rate and rhythm, normal S1 and S2; No lower extremity edema;  ABDOMEN: Nontender to palpation, normoactive bowel sounds, no rebound/guarding; No hepatosplenomegaly  MUSCULOSKELETAL no clubbing or cyanosis of digits; no joint swelling or tenderness to palpation  PSYCH: A+O x 0; Minimally verbal baseline  NEURO: Non-focal, no tremors  SKIN: No rashes    LABS:                        12.1   3.10  )-----------( 100      ( 25 Mar 2020 06:42 )             36.0     03-25    133<L>  |  95<L>  |  13  ----------------------------<  81  4.3   |  24  |  0.76    Ca    8.4      25 Mar 2020 06:42  Phos  3.6     03-25  Mg     2.2     03-25    TPro  6.2  /  Alb  3.1<L>  /  TBili  0.2  /  DBili  x   /  AST  48<H>  /  ALT  26  /  AlkPhos  47  03-25    PT/INR - ( 24 Mar 2020 08:43 )   PT: 15.2 sec;   INR: 1.31 ratio         PTT - ( 24 Mar 2020 08:43 )  PTT:31.8 sec  CARDIAC MARKERS ( 24 Mar 2020 07:09 )  x     / x     / 228 U/L / x     / x              Culture - Blood (collected 23 Mar 2020 18:26)  Source: .Blood Blood-Peripheral  Preliminary Report (24 Mar 2020 19:02):    No growth to date.    Culture - Blood (collected 23 Mar 2020 18:26)  Source: .Blood Blood-Peripheral  Preliminary Report (24 Mar 2020 19:02):    No growth to date.        RADIOLOGY & ADDITIONAL TESTS:  No new imaging or tests    COORDINATION OF CARE:  Care Discussed with Consultants/Other Providers [Y/N]:  Prior or Outpatient Records Reviewed [Y/N]:

## 2020-03-26 NOTE — PROGRESS NOTE ADULT - PROBLEM SELECTOR PLAN 2
CT chest revealed multifocal PNA  - Likely 2/2 COVID-19  - S/p vanc and Zosyn x 1 in ED  - Off abx now, only on hydroxychloroquine  - Wean O2 as tolerated  - Supportive care for fever and cough CT chest revealed multifocal PNA  - Likely 2/2 COVID-19  - S/p vanc and Zosyn x 1 in ED  - Off abx now, only on hydroxychloroquine; QTC wnl 3/25  - Wean O2 as tolerated  - Supportive care for fever and cough

## 2020-03-26 NOTE — PROGRESS NOTE ADULT - PROBLEM SELECTOR PLAN 3
Fever + tachycardia  - COVID-19 positive  - Bcx 3/23 NGTD, Ucx and urine legionella not sent  - S/p Zosyn & Vanc x 1 and 2L LR in ED.  - C/w COVID-19 treatment

## 2020-03-26 NOTE — DISCHARGE NOTE PROVIDER - HOSPITAL COURSE
30M w/ intellectual disability/Autism with self injurious behavior, HLD, hypothyroidism, GERD, seizure disorder, presents with fever and dry cough x 4 days, and acute worsening of SOB, found to have multifocal PNA 2/2 COVID-19 (positive at 3/23). Patient was started on hydroxychloroquine (QTC wnl), and received Vanc/Zosyn x 1 in ED for bacterial PNA coverage (later d/c'ed 2/2 no indications). Patient initially had escalation of O2 requirement to 6L via NC, however, was thought to be partially a result of underlying SHAWN. _____O2 weaned. _____. Had a transient episode of bradycardia to 40's, asymptomatic and spontaneous recovery. EKG post-episode didn't review any heart blocks or QT prolongation (unable to obtain EKG during the episode 2/2 technical difficulty).     Patient lives in a group home with 5 other residents, requiring full time aides 2/2 intellectual disability and aggressive behaviors. Given COVID-19 (+) status, home quarantine was difficult to achieve. 30M w/ intellectual disability/Autism with self injurious behavior, HLD, hypothyroidism, GERD, seizure disorder, presents with fever and dry cough x 4 days, and acute worsening of SOB, found to have multifocal PNA 2/2 COVID-19 (positive at 3/23). Patient was started on hydroxychloroquine (QTC wnl), and received Vanc/Zosyn x 1 in ED for bacterial PNA coverage (later d/c'ed 2/2 no indications). Patient initially had escalation of O2 requirement to 6L via NC, however, was thought to be partially a result of underlying SHAWN. _____O2 weaned to room air eventually _____. Had a transient episode of bradycardia to 40's, asymptomatic. EKG revealed bigeminy, and MCOT rhythm analysis didn't reveal any unstable heart blocks or QT prolongation.     Patient lives in a group home with 5 other residents, requiring full time aides 2/2 intellectual disability and aggressive behaviors. Given COVID-19 (+) status, home quarantine was difficult to achieve. 30M w/ intellectual disability/Autism with self injurious behavior, HLD, hypothyroidism, GERD, seizure disorder, presents with fever and dry cough x 4 days, and acute worsening of SOB, found to have multifocal PNA 2/2 COVID-19 (positive at 3/23). Patient was started on hydroxychloroquine (QTC wnl), and received Vanc/Zosyn x 1 in ED for bacterial PNA coverage (later d/c'ed 2/2 no indications). Patient initially had escalation of O2 requirement to 6L via NC, however, was thought to be partially a result of underlying SHAWN. O2 via NC weaned to room air eventually. Had a transient episode of bradycardia to 40's, asymptomatic. EKG revealed bigeminy, and MCOT rhythm analysis didn't reveal any unstable heart blocks or QT prolongation.     Patient lives in a group home with 5 other residents, requiring full time aides 2/2 intellectual disability and aggressive behaviors. Given COVID-19 (+) status, home quarantine was difficult to achieve. 30M w/ intellectual disability/Autism with self injurious behavior, HLD, hypothyroidism, GERD, seizure disorder, presents with fever and dry cough x 4 days, and acute worsening of SOB, found to have multifocal PNA 2/2 COVID-19 (positive at 3/23). Patient was started on hydroxychloroquine (QTC wnl), and received Vanc/Zosyn x 1 in ED for bacterial PNA coverage (later d/c'ed 2/2 no indications). Patient initially had escalation of O2 requirement to 6L via NC, however, was thought to be partially a result of underlying SHAWN. O2 via NC weaned to room air eventually. Had a transient episode of bradycardia to 40's, asymptomatic. EKG revealed bigeminy, and MCOT rhythm analysis didn't reveal any unstable heart blocks or QT prolongation.     Patient lives in a group home with 5 other residents, requiring full time aides 2/2 intellectual disability and aggressive behaviors. Given COVID-19 (+) status, home quarantine was difficult to achieve. Transportation was arranged back to group home on 4/6.

## 2020-03-26 NOTE — DISCHARGE NOTE PROVIDER - NSDCCPTREATMENT_GEN_ALL_CORE_FT
PRINCIPAL PROCEDURE  Procedure: CT chest wo con  Findings and Treatment: IMPRESSION:  Multifocal pneumonia. Differential includes COVID-19 infection. Correlation with RT-PCR is recommended.  Posterior left 10th and right seventh rib fracture deformities, likely subacute to chronic. Correlate clinically.

## 2020-03-26 NOTE — DISCHARGE NOTE PROVIDER - NSDCCPCAREPLAN_GEN_ALL_CORE_FT
PRINCIPAL DISCHARGE DIAGNOSIS  Diagnosis: Pneumonia  Assessment and Plan of Treatment: You came to the hospital with shortness of breath, and was found to have pneumonia as a result of COVID-19 (coronavirus) infection. You were given medications and oxygen supplement, and your symptoms improved. Please stay home for at least 14 days for quarantine, and be aware of social distancing. If you experience increased difficulty breathing, chest pain, or other severe symptoms, please call your primary care doctor or come to the hospital.      SECONDARY DISCHARGE DIAGNOSES  Diagnosis: Sepsis  Assessment and Plan of Treatment: You had sepsis (fever, fast heart rate in the setting of infection) due to COVID-19 infection. There was no bacteria in the blood and no other viral infections in upper respiratory system (results from urgent care). Please follow the instruction for COVID-19 infection protocol while at home. If you experience increased difficulty breathing, chest pain, or other severe symptoms, please call your primary care doctor or come to the hospital.

## 2020-03-26 NOTE — DISCHARGE NOTE PROVIDER - DISCHARGE DATE
-- restart prednisone as prescribed  -- start using nebulizer or inhaler at least 4x/day for next 3 days, then as needed  -- try codeine cough syrup before bed - may cause drowsiness so now driving while taking  -- followup next for recheck 06-Apr-2020

## 2020-03-26 NOTE — DISCHARGE NOTE PROVIDER - CARE PROVIDER_API CALL
[facility])  Family Medicine  [address] [place] [address], [address]  [place] [address]  Phone: [phone]  Fax: [phone]  Follow Up Time: 2 weeks

## 2020-03-26 NOTE — DISCHARGE NOTE PROVIDER - NSDCFUADDAPPT_GEN_ALL_CORE_FT
Call [phone] and tell them you were discharged from [facility] with COVID, for a follow-up appt in 3-4 days

## 2020-03-26 NOTE — DISCHARGE NOTE PROVIDER - NSDCMRMEDTOKEN_GEN_ALL_CORE_FT
acetaminophen 325 mg oral tablet: 2 tab(s) orally every 4 hours, As Needed-pain  benzoyl peroxide 10% topical gel: Apply topically to affected area once a day (at bedtime)  benzoyl peroxide 5% wash: Apply topically to affected area once a day (in the morning)  Centrum Silver oral tablet: 1 tab(s) orally once a day  Claritin 10 mg oral tablet: 1 tab(s) orally once a day  clindamycin 1% topical solution: Apply topically to affected area once a day  Colace 100 mg oral capsule: 1 cap(s) orally 2 times a day  Depakote  mg oral tablet, extended release: 1 tab(s) orally 2 times a day  desmopressin 0.2 mg oral tablet: 1 tab(s) orally once a day (at bedtime)  diazePAM 10 mg oral tablet: 2 tab(s) orally once a day, As Needed - 1hour prior PCP, Urology, ENT, DENTAL, Neuro, Xray, Derm and testing  fluticasone 50 mcg/inh nasal spray: 1 spray(s) nasal once a day  Kristalose 10 g oral powder for reconstitution: 1 packet(s) orally once a day  lactase 3000 units oral tablet: 3 tab(s) orally once a day  levothyroxine 50 mcg (0.05 mg) oral tablet: 1 tab(s) orally once a day (in the morning)  magnesium citrate: 150 milliliter(s) orally once a day, As Needed on 2nd day no Bowel Movement  OXcarbazepine 600 mg oral tablet: 1 tab(s) orally once a day (in the morning)  OXcarbazepine 600 mg oral tablet: 2 tab(s) orally once a day (at bedtime)  oxybutynin 5 mg oral tablet: 1 tab(s) orally 3 times a day  psyllium oral capsule: 2 cap(s) orally once a day  Saline Mist 0.65% nasal spray: 2 spray(s) nasal once a day  thiothixene 5 mg oral capsule: 2 cap(s) orally once a day (at bedtime)

## 2020-03-26 NOTE — PROGRESS NOTE ADULT - PROBLEM SELECTOR PLAN 1
COVID-19 (+) 3/23  - Hydroxychloroquine 400 mg BID x 1 day, then 200 mg BID x 4 days (3/23 - )  - AZT not required per ID.   - Daily EKG for QTC   - Wean O2 as tolerated COVID-19 (+) 3/23  - Hydroxychloroquine 400 mg BID x 1 day, then 200 mg BID x 4 days (3/23 - )  - AZT not required per ID.   - Daily EKG for QTC   - Wean O2 as tolerated; currently still on 6L with SaO2 92-97%, may have a component of SHAWN.

## 2020-03-26 NOTE — PROGRESS NOTE ADULT - PROBLEM SELECTOR PLAN 5
DVT ppx: Lovenox 40 mg daily  Diet: Regular  Activity: as tolerated  Dispo: Pending clinical w/u DVT ppx: Lovenox 40 mg daily  Diet: Regular  Activity: as tolerated  Dispo: Pending clinical w/u; May have difficulty with home quarantine 2/2 living in group home with full time aides.

## 2020-03-26 NOTE — DISCHARGE NOTE PROVIDER - NSDCCAREPROVSEEN_GEN_ALL_CORE_FT
[facility] Medicine, Team 1 [facility] Medicine, Team 1  [facility] Medicine, Team 2

## 2020-03-27 DIAGNOSIS — R00.1 BRADYCARDIA, UNSPECIFIED: ICD-10-CM

## 2020-03-27 LAB
ALBUMIN SERPL ELPH-MCNC: 3.2 G/DL — LOW (ref 3.3–5)
ALP SERPL-CCNC: 55 U/L — SIGNIFICANT CHANGE UP (ref 40–120)
ALT FLD-CCNC: 42 U/L — SIGNIFICANT CHANGE UP (ref 10–45)
ANION GAP SERPL CALC-SCNC: 14 MMOL/L — SIGNIFICANT CHANGE UP (ref 5–17)
AST SERPL-CCNC: 62 U/L — HIGH (ref 10–40)
BILIRUB SERPL-MCNC: 0.3 MG/DL — SIGNIFICANT CHANGE UP (ref 0.2–1.2)
BUN SERPL-MCNC: 15 MG/DL — SIGNIFICANT CHANGE UP (ref 7–23)
CALCIUM SERPL-MCNC: 8.5 MG/DL — SIGNIFICANT CHANGE UP (ref 8.4–10.5)
CHLORIDE SERPL-SCNC: 96 MMOL/L — SIGNIFICANT CHANGE UP (ref 96–108)
CO2 SERPL-SCNC: 25 MMOL/L — SIGNIFICANT CHANGE UP (ref 22–31)
CREAT SERPL-MCNC: 0.67 MG/DL — SIGNIFICANT CHANGE UP (ref 0.5–1.3)
CRP SERPL-MCNC: 8.76 MG/DL — HIGH (ref 0–0.4)
G6PD RBC-CCNC: 12.9 U/G HGB — SIGNIFICANT CHANGE UP (ref 7–20.5)
GLUCOSE SERPL-MCNC: 90 MG/DL — SIGNIFICANT CHANGE UP (ref 70–99)
HCT VFR BLD CALC: 37.7 % — LOW (ref 39–50)
HGB BLD-MCNC: 12.7 G/DL — LOW (ref 13–17)
MAGNESIUM SERPL-MCNC: 2.4 MG/DL — SIGNIFICANT CHANGE UP (ref 1.6–2.6)
MCHC RBC-ENTMCNC: 30.8 PG — SIGNIFICANT CHANGE UP (ref 27–34)
MCHC RBC-ENTMCNC: 33.7 GM/DL — SIGNIFICANT CHANGE UP (ref 32–36)
MCV RBC AUTO: 91.3 FL — SIGNIFICANT CHANGE UP (ref 80–100)
NRBC # BLD: 0 /100 WBCS — SIGNIFICANT CHANGE UP (ref 0–0)
PHOSPHATE SERPL-MCNC: 3.3 MG/DL — SIGNIFICANT CHANGE UP (ref 2.5–4.5)
PLATELET # BLD AUTO: 119 K/UL — LOW (ref 150–400)
POTASSIUM SERPL-MCNC: 4.1 MMOL/L — SIGNIFICANT CHANGE UP (ref 3.5–5.3)
POTASSIUM SERPL-SCNC: 4.1 MMOL/L — SIGNIFICANT CHANGE UP (ref 3.5–5.3)
PROT SERPL-MCNC: 6.5 G/DL — SIGNIFICANT CHANGE UP (ref 6–8.3)
RBC # BLD: 4.13 M/UL — LOW (ref 4.2–5.8)
RBC # FLD: 13 % — SIGNIFICANT CHANGE UP (ref 10.3–14.5)
SODIUM SERPL-SCNC: 135 MMOL/L — SIGNIFICANT CHANGE UP (ref 135–145)
WBC # BLD: 4.54 K/UL — SIGNIFICANT CHANGE UP (ref 3.8–10.5)
WBC # FLD AUTO: 4.54 K/UL — SIGNIFICANT CHANGE UP (ref 3.8–10.5)

## 2020-03-27 PROCEDURE — 99232 SBSQ HOSP IP/OBS MODERATE 35: CPT | Mod: GC

## 2020-03-27 RX ADMIN — Medication 10 MILLIGRAM(S): at 20:00

## 2020-03-27 RX ADMIN — Medication 200 MILLIGRAM(S): at 05:43

## 2020-03-27 RX ADMIN — DESMOPRESSIN ACETATE 0.2 MILLIGRAM(S): 0.1 TABLET ORAL at 22:10

## 2020-03-27 RX ADMIN — Medication 200 MILLIGRAM(S): at 18:30

## 2020-03-27 RX ADMIN — OXCARBAZEPINE 600 MILLIGRAM(S): 300 TABLET, FILM COATED ORAL at 06:19

## 2020-03-27 RX ADMIN — DIVALPROEX SODIUM 500 MILLIGRAM(S): 500 TABLET, DELAYED RELEASE ORAL at 06:19

## 2020-03-27 RX ADMIN — OXCARBAZEPINE 1200 MILLIGRAM(S): 300 TABLET, FILM COATED ORAL at 18:30

## 2020-03-27 RX ADMIN — Medication 5 MILLIGRAM(S): at 14:42

## 2020-03-27 RX ADMIN — Medication 5 MILLIGRAM(S): at 22:09

## 2020-03-27 RX ADMIN — ENOXAPARIN SODIUM 40 MILLIGRAM(S): 100 INJECTION SUBCUTANEOUS at 14:41

## 2020-03-27 RX ADMIN — DIVALPROEX SODIUM 500 MILLIGRAM(S): 500 TABLET, DELAYED RELEASE ORAL at 18:30

## 2020-03-27 RX ADMIN — Medication 5 MILLIGRAM(S): at 05:43

## 2020-03-27 RX ADMIN — Medication 50 MICROGRAM(S): at 05:43

## 2020-03-27 NOTE — DIETITIAN INITIAL EVALUATION ADULT. - PHYSICAL APPEARANCE
obese/other (specify) Ht: 66inches, Wt: 199.5lbs, BMI: 32.3kg/m2, IBW: lbs +/- 10%, %IBW: %  Edema: none, Skin: free of pressure injuries per nursing flow sheets

## 2020-03-27 NOTE — DIETITIAN INITIAL EVALUATION ADULT. - PROBLEM SELECTOR PLAN 3
Fever + tachycardia  - 2/2 viral or bacterial PNA most likely; COVID-19 positive  - F/u Bcx & urine legionella  - S/p Zosyn & Vanc x 1 and 2L LR in ED.  - C/w empirical CTX and AZT as above  - Start COVID-19 treatment

## 2020-03-27 NOTE — PROGRESS NOTE ADULT - PROBLEM SELECTOR PLAN 3
2 episodes of jose to 40's  - Called the [facility]ropago for rhythm analysis, will f/u with report  - Spontaneous recovery to normal sinus, asymptomatic during the episode.  - EKG post episode revealed no heart block or QT prolongation  - Continue to monitor 2 episodes of jose to 40's  - Per [facility] rhythm analysis, no report of bradycardia. May be 2/2 measurement error pulse ox.   - Asymptomatic during the reported episodes.   - EKG post episode revealed no heart block or QT prolongation  - Continue to monitor

## 2020-03-27 NOTE — PROGRESS NOTE ADULT - PROBLEM SELECTOR PLAN 1
COVID-19 (+) 3/23  - Hydroxychloroquine 400 mg BID x 1 day, then 200 mg BID x 4 days (3/23 - 27)  - AZT not required per ID.   - Daily EKG for QTC   - Wean O2 as tolerated; currently still on 4L with SaO2 92-97%, may have a component of SHAWN. COVID-19 (+) 3/23  - Hydroxychloroquine 400 mg BID x 1 day, then 200 mg BID x 4 days (3/23 - 28)  - AZT not required per ID.   - Daily EKG for QTC; wnl 3/25-26  - Wean O2 as tolerated; currently still on 4L with SaO2 92-97%, may have a component of SHAWN.

## 2020-03-27 NOTE — DIETITIAN INITIAL EVALUATION ADULT. - OTHER INFO
Pertinent Information: 30M w/ intellectual disability/Autism with self injurious behavior, HLD, hypothyroidism, GERD, seizure disorder, presents with fever and dry cough x 4 days, and acute worsening of SOB, found to have fever. Covid 19 positive.     Unable to conduct a face to face interview or nutrition-focused physical exam due to limited contact restrictions related to patient's medical condition and isolation precaution. Per chart pt minimally verbal at baseline, spoke with Jocelynn from patient's group home. Per report pt is a "picky eater" at baseline, prefers junk food over fruits and vegetables, requires maximum encouragement during meals to eat "healthier foods". Reports pt with decreased PO intake for a few days due to feeling unwell, takes pt with consuming mostly soups. Confirms NKFA, reports pt with lactose intolerance. Obtained food preferences, will honor as able. In-house pt with decreased PO intake, consuming < 50% of meals per nursing flow sheets. Last BM 3/26.     Weight: unable to obtain UBW but group home denies any significant changes in weight, believes weight to be stable. Current dosing weight is ~200lbs (3/23).

## 2020-03-27 NOTE — PROGRESS NOTE ADULT - PROBLEM SELECTOR PLAN 6
DVT ppx: Lovenox 40 mg daily  Diet: Regular  Activity: as tolerated  Dispo: Pending clinical w/u; May have difficulty with home quarantine 2/2 living in group home with full time aides.

## 2020-03-27 NOTE — PROVIDER CONTACT NOTE (OTHER) - ASSESSMENT
Pt found asleep in bed. A&Ox1, VSS. Pt bradycardic at some point as low as 44 HR. No visible signs of pain, discomfort, diaphoresis, trembling.

## 2020-03-27 NOTE — DIETITIAN INITIAL EVALUATION ADULT. - PROBLEM SELECTOR PLAN 2
COVID-19 (+) 3/23    Labs to be checked on admission    -- CBC w/ diff; CMP/Mg/Phos    -- Coags, D-dimer    -- Procalcitonin, lactate    -- ESR, CRP, ferritin    -- LDH, CKs, ABG with lactate; T-cell subset    -- G6PD  - Hydroxychloroquine 400 mg BID x 1 day, then 200 mg BID x 4 days

## 2020-03-27 NOTE — PROGRESS NOTE ADULT - PROBLEM SELECTOR PLAN 4
Resolved  - COVID-19 positive  - Bcx 3/23 NGTD, Ucx and urine legionella not sent  - S/p Zosyn & Vanc x 1 and 2L LR in ED.  - C/w COVID-19 treatment

## 2020-03-27 NOTE — DIETITIAN INITIAL EVALUATION ADULT. - ADD RECOMMEND
1) Continue current diet as tolerated. 2) Recommend addition of Ensure Enlive 2x per day to supplement PO intake.  3) RD to remain available and follow-up as medically appropriate.

## 2020-03-27 NOTE — PROGRESS NOTE ADULT - PROBLEM SELECTOR PLAN 2
CT chest revealed multifocal PNA  - Likely 2/2 COVID-19  - S/p vanc and Zosyn x 1 in ED  - Off abx now, only on hydroxychloroquine; QTC wnl 3/26  - Wean O2 as tolerated  - Supportive care for fever and cough

## 2020-03-27 NOTE — DIETITIAN INITIAL EVALUATION ADULT. - PROBLEM SELECTOR PLAN 1
CT chest revealed multifocal PNA  - COVID-19 vs. superimposed CAP  - C/w empirical coverage with CTX 1g and  mg x 5 days  - COVID-19 (+)  - Wean O2 as tolerated  - Supportive care for fever and cough

## 2020-03-28 LAB
ALBUMIN SERPL ELPH-MCNC: 3.3 G/DL — SIGNIFICANT CHANGE UP (ref 3.3–5)
ALP SERPL-CCNC: 59 U/L — SIGNIFICANT CHANGE UP (ref 40–120)
ALT FLD-CCNC: 37 U/L — SIGNIFICANT CHANGE UP (ref 10–45)
ANION GAP SERPL CALC-SCNC: 14 MMOL/L — SIGNIFICANT CHANGE UP (ref 5–17)
ANION GAP SERPL CALC-SCNC: 14 MMOL/L — SIGNIFICANT CHANGE UP (ref 5–17)
AST SERPL-CCNC: 41 U/L — HIGH (ref 10–40)
BILIRUB SERPL-MCNC: 0.3 MG/DL — SIGNIFICANT CHANGE UP (ref 0.2–1.2)
BUN SERPL-MCNC: 18 MG/DL — SIGNIFICANT CHANGE UP (ref 7–23)
BUN SERPL-MCNC: 18 MG/DL — SIGNIFICANT CHANGE UP (ref 7–23)
CALCIUM SERPL-MCNC: 8.8 MG/DL — SIGNIFICANT CHANGE UP (ref 8.4–10.5)
CALCIUM SERPL-MCNC: 9 MG/DL — SIGNIFICANT CHANGE UP (ref 8.4–10.5)
CHLORIDE SERPL-SCNC: 101 MMOL/L — SIGNIFICANT CHANGE UP (ref 96–108)
CHLORIDE SERPL-SCNC: 103 MMOL/L — SIGNIFICANT CHANGE UP (ref 96–108)
CO2 SERPL-SCNC: 23 MMOL/L — SIGNIFICANT CHANGE UP (ref 22–31)
CO2 SERPL-SCNC: 23 MMOL/L — SIGNIFICANT CHANGE UP (ref 22–31)
CREAT SERPL-MCNC: 0.55 MG/DL — SIGNIFICANT CHANGE UP (ref 0.5–1.3)
CREAT SERPL-MCNC: 0.61 MG/DL — SIGNIFICANT CHANGE UP (ref 0.5–1.3)
CRP SERPL-MCNC: 5.75 MG/DL — HIGH (ref 0–0.4)
CULTURE RESULTS: SIGNIFICANT CHANGE UP
CULTURE RESULTS: SIGNIFICANT CHANGE UP
ERYTHROCYTE [SEDIMENTATION RATE] IN BLOOD: 59 MM/HR — HIGH (ref 0–15)
FERRITIN SERPL-MCNC: 929 NG/ML — HIGH (ref 30–400)
GLUCOSE SERPL-MCNC: 81 MG/DL — SIGNIFICANT CHANGE UP (ref 70–99)
GLUCOSE SERPL-MCNC: 88 MG/DL — SIGNIFICANT CHANGE UP (ref 70–99)
HCT VFR BLD CALC: 41.6 % — SIGNIFICANT CHANGE UP (ref 39–50)
HGB BLD-MCNC: 13.5 G/DL — SIGNIFICANT CHANGE UP (ref 13–17)
LACTATE BLDV-MCNC: 1.6 MMOL/L — SIGNIFICANT CHANGE UP (ref 0.7–2)
MAGNESIUM SERPL-MCNC: 2.2 MG/DL — SIGNIFICANT CHANGE UP (ref 1.6–2.6)
MAGNESIUM SERPL-MCNC: 2.3 MG/DL — SIGNIFICANT CHANGE UP (ref 1.6–2.6)
MCHC RBC-ENTMCNC: 30 PG — SIGNIFICANT CHANGE UP (ref 27–34)
MCHC RBC-ENTMCNC: 32.5 GM/DL — SIGNIFICANT CHANGE UP (ref 32–36)
MCV RBC AUTO: 92.4 FL — SIGNIFICANT CHANGE UP (ref 80–100)
NRBC # BLD: 0 /100 WBCS — SIGNIFICANT CHANGE UP (ref 0–0)
PHOSPHATE SERPL-MCNC: 3.6 MG/DL — SIGNIFICANT CHANGE UP (ref 2.5–4.5)
PHOSPHATE SERPL-MCNC: 3.6 MG/DL — SIGNIFICANT CHANGE UP (ref 2.5–4.5)
PLATELET # BLD AUTO: 139 K/UL — LOW (ref 150–400)
POTASSIUM SERPL-MCNC: 4.4 MMOL/L — SIGNIFICANT CHANGE UP (ref 3.5–5.3)
POTASSIUM SERPL-MCNC: 4.8 MMOL/L — SIGNIFICANT CHANGE UP (ref 3.5–5.3)
POTASSIUM SERPL-SCNC: 4.4 MMOL/L — SIGNIFICANT CHANGE UP (ref 3.5–5.3)
POTASSIUM SERPL-SCNC: 4.8 MMOL/L — SIGNIFICANT CHANGE UP (ref 3.5–5.3)
PROT SERPL-MCNC: 6.8 G/DL — SIGNIFICANT CHANGE UP (ref 6–8.3)
RBC # BLD: 4.5 M/UL — SIGNIFICANT CHANGE UP (ref 4.2–5.8)
RBC # FLD: 12.9 % — SIGNIFICANT CHANGE UP (ref 10.3–14.5)
SODIUM SERPL-SCNC: 138 MMOL/L — SIGNIFICANT CHANGE UP (ref 135–145)
SODIUM SERPL-SCNC: 140 MMOL/L — SIGNIFICANT CHANGE UP (ref 135–145)
SPECIMEN SOURCE: SIGNIFICANT CHANGE UP
SPECIMEN SOURCE: SIGNIFICANT CHANGE UP
WBC # BLD: 4.97 K/UL — SIGNIFICANT CHANGE UP (ref 3.8–10.5)
WBC # FLD AUTO: 4.97 K/UL — SIGNIFICANT CHANGE UP (ref 3.8–10.5)

## 2020-03-28 PROCEDURE — 93010 ELECTROCARDIOGRAM REPORT: CPT

## 2020-03-28 PROCEDURE — 99233 SBSQ HOSP IP/OBS HIGH 50: CPT | Mod: GC

## 2020-03-28 RX ORDER — ASCORBIC ACID 60 MG
1000 TABLET,CHEWABLE ORAL DAILY
Refills: 0 | Status: COMPLETED | OUTPATIENT
Start: 2020-03-28 | End: 2020-04-01

## 2020-03-28 RX ORDER — ZINC SULFATE TAB 220 MG (50 MG ZINC EQUIVALENT) 220 (50 ZN) MG
220 TAB ORAL DAILY
Refills: 0 | Status: COMPLETED | OUTPATIENT
Start: 2020-03-28 | End: 2020-04-02

## 2020-03-28 RX ADMIN — Medication 10 MILLIGRAM(S): at 22:28

## 2020-03-28 RX ADMIN — Medication 50 MICROGRAM(S): at 05:28

## 2020-03-28 RX ADMIN — Medication 200 MILLIGRAM(S): at 05:29

## 2020-03-28 RX ADMIN — DIVALPROEX SODIUM 500 MILLIGRAM(S): 500 TABLET, DELAYED RELEASE ORAL at 06:00

## 2020-03-28 RX ADMIN — Medication 5 MILLIGRAM(S): at 16:27

## 2020-03-28 RX ADMIN — OXCARBAZEPINE 600 MILLIGRAM(S): 300 TABLET, FILM COATED ORAL at 06:00

## 2020-03-28 RX ADMIN — Medication 200 MILLIGRAM(S): at 17:22

## 2020-03-28 RX ADMIN — ZINC SULFATE TAB 220 MG (50 MG ZINC EQUIVALENT) 220 MILLIGRAM(S): 220 (50 ZN) TAB at 16:28

## 2020-03-28 RX ADMIN — Medication 5 MILLIGRAM(S): at 05:29

## 2020-03-28 RX ADMIN — DIVALPROEX SODIUM 500 MILLIGRAM(S): 500 TABLET, DELAYED RELEASE ORAL at 17:22

## 2020-03-28 RX ADMIN — ENOXAPARIN SODIUM 40 MILLIGRAM(S): 100 INJECTION SUBCUTANEOUS at 11:55

## 2020-03-28 RX ADMIN — Medication 5 MILLIGRAM(S): at 22:30

## 2020-03-28 RX ADMIN — DESMOPRESSIN ACETATE 0.2 MILLIGRAM(S): 0.1 TABLET ORAL at 22:30

## 2020-03-28 RX ADMIN — OXCARBAZEPINE 1200 MILLIGRAM(S): 300 TABLET, FILM COATED ORAL at 17:22

## 2020-03-28 NOTE — PROGRESS NOTE ADULT - PROBLEM SELECTOR PLAN 2
CT chest revealed multifocal PNA  - Likely 2/2 COVID-19  - S/p vanc and Zosyn x 1 in ED  - Off abx now, only on hydroxychloroquine; QTC wnl 3/26  - Wean O2 as tolerated  - Supportive care for fever and cough Intermittent jose to 40's.   - EKG today (HR 60-70's) revealed bigeminy.   - Will replete lytes  - Will curbside cards  - Patient asymptomatic Intermittent jose to 40's. tsh nl  - EKG today (HR 60-70's) revealed bigeminy.   - Will replete lytes  - Patient asymptomatic, monitor HR

## 2020-03-28 NOTE — PROGRESS NOTE ADULT - PROBLEM SELECTOR PLAN 4
Resolved  - COVID-19 positive  - Bcx 3/23 NGTD, Ucx and urine legionella not sent  - S/p Zosyn & Vanc x 1 and 2L LR in ED.  - C/w COVID-19 treatment DVT ppx: Lovenox 40 mg daily  Diet: Regular  Activity: as tolerated  Dispo: Pending clinical w/u; May have difficulty with home quarantine 2/2 living in group home with full time aides. C/w home meds

## 2020-03-28 NOTE — PROGRESS NOTE ADULT - PROBLEM SELECTOR PLAN 1
COVID-19 (+) 3/23  - Hydroxychloroquine 400 mg BID x 1 day, then 200 mg BID x 4 days (3/23 - 28)  - AZT not required per ID.   - Daily EKG for QTC; wnl 3/25-26  - Wean O2 as tolerated; currently still on 4L with SaO2 92-97%, may have a component of SHAWN. COVID-19 (+) 3/23  - Hydroxychloroquine 400 mg BID x 1 day, then 200 mg BID x 4 days (3/23 - 28)  - AZT not required per ID.   - Daily EKG for QTC; wnl 3/25-26  - Wean O2 as tolerated; currently on 2L with SaO2 92-95%, may have a component of SHAWN. COVID-19 (+) 3/23  - Hydroxychloroquine 400 mg BID x 1 day, then 200 mg BID x 4 days (3/23 - 28)  - AZT not required per ID.   - Daily EKG for QTC; wnl 3/25-26  - Wean O2 as tolerated; currently on 2L with SaO2 92-95%, may have a component of SHAWN.  - trial vit c and zinc po

## 2020-03-28 NOTE — PROGRESS NOTE ADULT - PROBLEM SELECTOR PLAN 5
C/w home meds 1. Name of PCP:   2. PCP contacted on admission: [  ] Y   [ x ] N  3. PCP contacted at Discharge: [  ] Y   [  ] N  4. Post-Discharge Appointment Date and Location:   5 Summary of Handoff given to PCP: DVT ppx: Lovenox 40 mg daily  Diet: Regular  Activity: as tolerated  Dispo: Pending clinical w/u; May have difficulty with home quarantine 2/2 living in group home with full time aides.

## 2020-03-28 NOTE — PROVIDER CONTACT NOTE (OTHER) - BACKGROUND
pt a&o1 with intellectual diability/autism presented from group home. admitted with covid 19 positive

## 2020-03-28 NOTE — PROVIDER CONTACT NOTE (OTHER) - ASSESSMENT
pt a&o1, VSS, mcon monitor found on floor when giving AM meds. pt removed this monitor yesterday. pt shows no s/s of distress. pt can be combative with care at times and when applying continuous pulse ox probes. pt a&o1, VSS, pt found awake in bed and mcot monitor found on floor when giving AM meds. pt removed this monitor yesterday as well. pt shows no s/s of distress. pt can be combative with care at times and when applying continuous pulse ox probes. No visible signs of pain, discomfort, diaphoresis, trembling.

## 2020-03-28 NOTE — PROGRESS NOTE ADULT - SUBJECTIVE AND OBJECTIVE BOX
PROGRESS NOTE:   Authoted by Dr. Daniella Sanchez MD  Pager [phone] [facility], [address] [address]     Patient is a 30y old  Male who presents with a chief complaint of SOB (27 Mar 2020 06:45)      SUBJECTIVE / OVERNIGHT EVENTS: No acute events overnight. O2 weaned to 2L.     REVIEW OF SYSTEMS:    Unable to obtain ROS 2/2 minimally verbal at baseline.     MEDICATIONS  (STANDING):  desmopressin 0.2 milliGRAM(s) Oral at bedtime  diVALproex  milliGRAM(s) Oral <User Schedule>  enoxaparin Injectable 40 milliGRAM(s) SubCutaneous daily  hydroxychloroquine 200 milliGRAM(s) Oral every 12 hours  hydroxychloroquine   Oral   levothyroxine 50 MICROGram(s) Oral daily  OXcarbazepine 600 milliGRAM(s) Oral <User Schedule>  OXcarbazepine 1200 milliGRAM(s) Oral <User Schedule>  oxybutynin 5 milliGRAM(s) Oral three times a day  thiothixene 10 milliGRAM(s) Oral <User Schedule>    MEDICATIONS  (PRN):  acetaminophen   Tablet .. 650 milliGRAM(s) Oral every 6 hours PRN Temp greater or equal to 38C (100.4F)  diazepam    Tablet 10 milliGRAM(s) Oral daily PRN for anxiety      CAPILLARY BLOOD GLUCOSE        I&O's Summary    27 Mar 2020 07:01  -  28 Mar 2020 07:00  --------------------------------------------------------  IN: 360 mL / OUT: 0 mL / NET: 360 mL        PHYSICAL EXAM:  Vital Signs Last 24 Hrs  T(C): 36.4 (28 Mar 2020 06:47), Max: 36.9 (27 Mar 2020 22:33)  T(F): 97.5 (28 Mar 2020 06:47), Max: 98.4 (27 Mar 2020 22:33)  HR: 56 (28 Mar 2020 06:47) (56 - 78)  BP: 102/60 (28 Mar 2020 06:47) (102/60 - 117/77)  BP(mean): --  RR: 20 (28 Mar 2020 06:47) (20 - 20)  SpO2: 95% (28 Mar 2020 06:47) (94% - 97%)    CONSTITUTIONAL: NAD, well-developed  RESPIRATORY: Normal respiratory effort on 4L O2; lungs are clear to auscultation bilaterally  CARDIOVASCULAR: Regular rate and rhythm, normal S1 and S2; No lower extremity edema;  ABDOMEN: Nontender to palpation, normoactive bowel sounds, no rebound/guarding; No hepatosplenomegaly  MUSCULOSKELETAL no clubbing or cyanosis of digits; no joint swelling or tenderness to palpation  PSYCH: A+O x 0; Minimally verbal baseline  NEURO: Non-focal, no tremors  SKIN: No rashes    LABS:                        12.7   4.54  )-----------( 119      ( 27 Mar 2020 06:15 )             37.7     03-27    135  |  96  |  15  ----------------------------<  90  4.1   |  25  |  0.67    Ca    8.5      27 Mar 2020 06:14  Phos  3.3     03-27  Mg     2.4     03-27    TPro  6.5  /  Alb  3.2<L>  /  TBili  0.3  /  DBili  x   /  AST  62<H>  /  ALT  42  /  AlkPhos  55  03-27        RADIOLOGY & ADDITIONAL TESTS:  No new imaging or tests    COORDINATION OF CARE:  Care Discussed with Consultants/Other Providers [Y/N]: ID  Prior or Outpatient Records Reviewed [Y/N]: N/A PROGRESS NOTE:   Authoted by Dr. Daniella Sanchez MD  Pager [phone] [facility], [address] [facility]     Patient is a 30y old  Male who presents with a chief complaint of SOB (27 Mar 2020 06:45)      SUBJECTIVE / OVERNIGHT EVENTS: Darci down to 40's again. Asymptomatic. VS stable. O2 weaned to 2L.     REVIEW OF SYSTEMS:    Unable to obtain ROS 2/2 minimally verbal at baseline.     MEDICATIONS  (STANDING):  desmopressin 0.2 milliGRAM(s) Oral at bedtime  diVALproex  milliGRAM(s) Oral <User Schedule>  enoxaparin Injectable 40 milliGRAM(s) SubCutaneous daily  hydroxychloroquine 200 milliGRAM(s) Oral every 12 hours  hydroxychloroquine   Oral   levothyroxine 50 MICROGram(s) Oral daily  OXcarbazepine 600 milliGRAM(s) Oral <User Schedule>  OXcarbazepine 1200 milliGRAM(s) Oral <User Schedule>  oxybutynin 5 milliGRAM(s) Oral three times a day  thiothixene 10 milliGRAM(s) Oral <User Schedule>    MEDICATIONS  (PRN):  acetaminophen   Tablet .. 650 milliGRAM(s) Oral every 6 hours PRN Temp greater or equal to 38C (100.4F)  diazepam    Tablet 10 milliGRAM(s) Oral daily PRN for anxiety      CAPILLARY BLOOD GLUCOSE        I&O's Summary    27 Mar 2020 07:01  -  28 Mar 2020 07:00  --------------------------------------------------------  IN: 360 mL / OUT: 0 mL / NET: 360 mL        PHYSICAL EXAM:  Vital Signs Last 24 Hrs  T(C): 36.4 (28 Mar 2020 06:47), Max: 36.9 (27 Mar 2020 22:33)  T(F): 97.5 (28 Mar 2020 06:47), Max: 98.4 (27 Mar 2020 22:33)  HR: 56 (28 Mar 2020 06:47) (56 - 78)  BP: 102/60 (28 Mar 2020 06:47) (102/60 - 117/77)  BP(mean): --  RR: 20 (28 Mar 2020 06:47) (20 - 20)  SpO2: 95% (28 Mar 2020 06:47) (94% - 97%)    CONSTITUTIONAL: NAD, well-developed  RESPIRATORY: Normal respiratory effort on 4L O2; lungs are clear to auscultation bilaterally  CARDIOVASCULAR: Regular rate and rhythm, normal S1 and S2; No lower extremity edema;  ABDOMEN: Nontender to palpation, normoactive bowel sounds, no rebound/guarding; No hepatosplenomegaly  MUSCULOSKELETAL no clubbing or cyanosis of digits; no joint swelling or tenderness to palpation  PSYCH: A+O x 0; Minimally verbal baseline  NEURO: Non-focal, no tremors  SKIN: No rashes    LABS:                        12.7   4.54  )-----------( 119      ( 27 Mar 2020 06:15 )             37.7     03-27    135  |  96  |  15  ----------------------------<  90  4.1   |  25  |  0.67    Ca    8.5      27 Mar 2020 06:14  Phos  3.3     03-27  Mg     2.4     03-27    TPro  6.5  /  Alb  3.2<L>  /  TBili  0.3  /  DBili  x   /  AST  62<H>  /  ALT  42  /  AlkPhos  55  03-27        RADIOLOGY & ADDITIONAL TESTS:  No new imaging or tests    COORDINATION OF CARE:  Care Discussed with Consultants/Other Providers [Y/N]: ID  Prior or Outpatient Records Reviewed [Y/N]: N/A PROGRESS NOTE:   Authoted by Dr. Daniella Sanchez MD  Pager [phone] [facility], [id number] [facility]     Patient is a 30y old  Male who presents with a chief complaint of SOB (27 Mar 2020 06:45)      SUBJECTIVE / OVERNIGHT EVENTS: Darci down to 40's again. Asymptomatic. VS stable. O2 weaned to 2L.     REVIEW OF SYSTEMS:    Unable to obtain ROS 2/2 minimally verbal at baseline.       CAPILLARY BLOOD GLUCOSE        I&O's Summary    27 Mar 2020 07:01  -  28 Mar 2020 07:00  --------------------------------------------------------  IN: 360 mL / OUT: 0 mL / NET: 360 mL        PHYSICAL EXAM:  Vital Signs Last 24 Hrs  T(C): 36.4 (28 Mar 2020 06:47), Max: 36.9 (27 Mar 2020 22:33)  T(F): 97.5 (28 Mar 2020 06:47), Max: 98.4 (27 Mar 2020 22:33)  HR: 56 (28 Mar 2020 06:47) (56 - 78)  BP: 102/60 (28 Mar 2020 06:47) (102/60 - 117/77)  BP(mean): --  RR: 20 (28 Mar 2020 06:47) (20 - 20)  SpO2: 95% (28 Mar 2020 06:47) (94% - 97%)    CONSTITUTIONAL: NAD, well-developed  RESPIRATORY: Normal respiratory effort on 4L O2; lungs are clear to auscultation bilaterally  CARDIOVASCULAR: Regular rate and rhythm, normal S1 and S2; No lower extremity edema;  ABDOMEN: Nontender to palpation, normoactive bowel sounds, no rebound/guarding; No hepatosplenomegaly  MUSCULOSKELETAL no clubbing or cyanosis of digits; no joint swelling or tenderness to palpation  PSYCH: A+O x 0; Minimally verbal baseline  NEURO: Non-focal, no tremors  SKIN: No rashes    LABS:                        12.7   4.54  )-----------( 119      ( 27 Mar 2020 06:15 )             37.7     03-27    135  |  96  |  15  ----------------------------<  90  4.1   |  25  |  0.67    Ca    8.5      27 Mar 2020 06:14  Phos  3.3     03-27  Mg     2.4     03-27    TPro  6.5  /  Alb  3.2<L>  /  TBili  0.3  /  DBili  x   /  AST  62<H>  /  ALT  42  /  AlkPhos  55  03-27        RADIOLOGY & ADDITIONAL TESTS:  No new imaging or tests    COORDINATION OF CARE:  Care Discussed with Consultants/Other Providers [Y/N]: ID  Prior or Outpatient Records Reviewed [Y/N]: N/A    MEDICATIONS  (STANDING):  ascorbic acid 1000 milliGRAM(s) Oral daily  desmopressin 0.2 milliGRAM(s) Oral at bedtime  diVALproex  milliGRAM(s) Oral <User Schedule>  enoxaparin Injectable 40 milliGRAM(s) SubCutaneous daily  hydroxychloroquine 200 milliGRAM(s) Oral every 12 hours  hydroxychloroquine   Oral   levothyroxine 50 MICROGram(s) Oral daily  OXcarbazepine 600 milliGRAM(s) Oral <User Schedule>  OXcarbazepine 1200 milliGRAM(s) Oral <User Schedule>  oxybutynin 5 milliGRAM(s) Oral three times a day  thiothixene 10 milliGRAM(s) Oral <User Schedule>  zinc sulfate 220 milliGRAM(s) Oral daily    MEDICATIONS  (PRN):  acetaminophen   Tablet .. 650 milliGRAM(s) Oral every 6 hours PRN Temp greater or equal to 38C (100.4F)  diazepam    Tablet 10 milliGRAM(s) Oral daily PRN for anxiety

## 2020-03-28 NOTE — PROGRESS NOTE ADULT - PROBLEM SELECTOR PLAN 6
DVT ppx: Lovenox 40 mg daily  Diet: Regular  Activity: as tolerated  Dispo: Pending clinical w/u; May have difficulty with home quarantine 2/2 living in group home with full time aides. 1. Name of PCP:   2. PCP contacted on admission: [  ] Y   [ x ] N  3. PCP contacted at Discharge: [  ] Y   [  ] N  4. Post-Discharge Appointment Date and Location:   5 Summary of Handoff given to PCP:

## 2020-03-28 NOTE — PROGRESS NOTE ADULT - ASSESSMENT
30M w/ intellectual disability/Autism with self injurious behavior, HLD, hypothyroidism, GERD, seizure disorder, presents with fever and dry cough x 4 days, and acute worsening of SOB, found to have multifocal PNA 2/2 COVID-19 (positive at 3/23). 30M w/ intellectual disability/Autism with self injurious behavior, HLD, hypothyroidism, GERD, seizure disorder, presents with fever and dry cough x 4 days, and acute worsening of SOB, found to have multifocal PNA 2/2 COVID-19 (positive at 3/23). C/b bradycardia. 30M w/ intellectual disability/Autism with self injurious behavior, HLD, hypothyroidism, GERD, seizure disorder, presents with fever and dry cough x 4 days, and acute worsening of SOB, found to have acute hypoxic resp failure due to multifocal PNA 2/2 COVID-19 (positive at 3/23). C/b bradycardia.

## 2020-03-28 NOTE — PROGRESS NOTE ADULT - PROBLEM SELECTOR PLAN 3
2 episodes of jose to 40's  - Per [facility] rhythm analysis, no report of bradycardia. May be 2/2 measurement error pulse ox.   - Asymptomatic during the reported episodes.   - EKG post episode revealed no heart block or QT prolongation  - Continue to monitor C/w home meds CT chest revealed multifocal PNA  - Likely 2/2 COVID-19  - S/p vanc and Zosyn x 1 in ED  - Off abx now, only on hydroxychloroquine; QTC wnl 3/26  - Wean O2 as tolerated  - Supportive care for fever and cough CT chest revealed multifocal PNA, Likely 2/2 COVID-19  - S/p vanc and Zosyn x 1 in ED  - Off abx now, only on hydroxychloroquine; QTC wnl 3/26  - Wean O2 as tolerated  - Supportive care for fever and cough

## 2020-03-29 LAB
ALBUMIN SERPL ELPH-MCNC: 3.5 G/DL — SIGNIFICANT CHANGE UP (ref 3.3–5)
ALP SERPL-CCNC: 65 U/L — SIGNIFICANT CHANGE UP (ref 40–120)
ALT FLD-CCNC: 41 U/L — SIGNIFICANT CHANGE UP (ref 10–45)
ANION GAP SERPL CALC-SCNC: 15 MMOL/L — SIGNIFICANT CHANGE UP (ref 5–17)
AST SERPL-CCNC: 45 U/L — HIGH (ref 10–40)
BILIRUB SERPL-MCNC: 0.3 MG/DL — SIGNIFICANT CHANGE UP (ref 0.2–1.2)
BUN SERPL-MCNC: 19 MG/DL — SIGNIFICANT CHANGE UP (ref 7–23)
CALCIUM SERPL-MCNC: 9.2 MG/DL — SIGNIFICANT CHANGE UP (ref 8.4–10.5)
CHLORIDE SERPL-SCNC: 103 MMOL/L — SIGNIFICANT CHANGE UP (ref 96–108)
CK SERPL-CCNC: 70 U/L — SIGNIFICANT CHANGE UP (ref 30–200)
CO2 SERPL-SCNC: 22 MMOL/L — SIGNIFICANT CHANGE UP (ref 22–31)
CREAT SERPL-MCNC: 0.58 MG/DL — SIGNIFICANT CHANGE UP (ref 0.5–1.3)
CRP SERPL-MCNC: 4.2 MG/DL — HIGH (ref 0–0.4)
D DIMER BLD IA.RAPID-MCNC: <150 NG/ML DDU — SIGNIFICANT CHANGE UP
GLUCOSE SERPL-MCNC: 81 MG/DL — SIGNIFICANT CHANGE UP (ref 70–99)
HCT VFR BLD CALC: 40.2 % — SIGNIFICANT CHANGE UP (ref 39–50)
HGB BLD-MCNC: 13.4 G/DL — SIGNIFICANT CHANGE UP (ref 13–17)
LDH SERPL L TO P-CCNC: 359 U/L — HIGH (ref 50–242)
MAGNESIUM SERPL-MCNC: 2.2 MG/DL — SIGNIFICANT CHANGE UP (ref 1.6–2.6)
MCHC RBC-ENTMCNC: 30.5 PG — SIGNIFICANT CHANGE UP (ref 27–34)
MCHC RBC-ENTMCNC: 33.3 GM/DL — SIGNIFICANT CHANGE UP (ref 32–36)
MCV RBC AUTO: 91.4 FL — SIGNIFICANT CHANGE UP (ref 80–100)
NRBC # BLD: 0 /100 WBCS — SIGNIFICANT CHANGE UP (ref 0–0)
PHOSPHATE SERPL-MCNC: 3.6 MG/DL — SIGNIFICANT CHANGE UP (ref 2.5–4.5)
PLATELET # BLD AUTO: 173 K/UL — SIGNIFICANT CHANGE UP (ref 150–400)
POTASSIUM SERPL-MCNC: 4.1 MMOL/L — SIGNIFICANT CHANGE UP (ref 3.5–5.3)
POTASSIUM SERPL-SCNC: 4.1 MMOL/L — SIGNIFICANT CHANGE UP (ref 3.5–5.3)
PROT SERPL-MCNC: 7.1 G/DL — SIGNIFICANT CHANGE UP (ref 6–8.3)
RBC # BLD: 4.4 M/UL — SIGNIFICANT CHANGE UP (ref 4.2–5.8)
RBC # FLD: 13 % — SIGNIFICANT CHANGE UP (ref 10.3–14.5)
SODIUM SERPL-SCNC: 140 MMOL/L — SIGNIFICANT CHANGE UP (ref 135–145)
TROPONIN T, HIGH SENSITIVITY RESULT: <6 NG/L — SIGNIFICANT CHANGE UP (ref 0–51)
WBC # BLD: 5.15 K/UL — SIGNIFICANT CHANGE UP (ref 3.8–10.5)
WBC # FLD AUTO: 5.15 K/UL — SIGNIFICANT CHANGE UP (ref 3.8–10.5)

## 2020-03-29 PROCEDURE — 99232 SBSQ HOSP IP/OBS MODERATE 35: CPT | Mod: GC

## 2020-03-29 RX ADMIN — Medication 10 MILLIGRAM(S): at 01:31

## 2020-03-29 RX ADMIN — Medication 50 MICROGRAM(S): at 06:23

## 2020-03-29 RX ADMIN — OXCARBAZEPINE 600 MILLIGRAM(S): 300 TABLET, FILM COATED ORAL at 06:23

## 2020-03-29 RX ADMIN — Medication 10 MILLIGRAM(S): at 20:25

## 2020-03-29 RX ADMIN — ENOXAPARIN SODIUM 40 MILLIGRAM(S): 100 INJECTION SUBCUTANEOUS at 12:50

## 2020-03-29 RX ADMIN — Medication 1000 MILLIGRAM(S): at 12:51

## 2020-03-29 RX ADMIN — Medication 5 MILLIGRAM(S): at 21:20

## 2020-03-29 RX ADMIN — Medication 5 MILLIGRAM(S): at 18:42

## 2020-03-29 RX ADMIN — DIVALPROEX SODIUM 500 MILLIGRAM(S): 500 TABLET, DELAYED RELEASE ORAL at 18:42

## 2020-03-29 RX ADMIN — Medication 5 MILLIGRAM(S): at 06:23

## 2020-03-29 RX ADMIN — ZINC SULFATE TAB 220 MG (50 MG ZINC EQUIVALENT) 220 MILLIGRAM(S): 220 (50 ZN) TAB at 12:51

## 2020-03-29 RX ADMIN — DIVALPROEX SODIUM 500 MILLIGRAM(S): 500 TABLET, DELAYED RELEASE ORAL at 06:23

## 2020-03-29 RX ADMIN — OXCARBAZEPINE 1200 MILLIGRAM(S): 300 TABLET, FILM COATED ORAL at 18:42

## 2020-03-29 RX ADMIN — DESMOPRESSIN ACETATE 0.2 MILLIGRAM(S): 0.1 TABLET ORAL at 21:20

## 2020-03-29 NOTE — PROGRESS NOTE ADULT - ASSESSMENT
30M w/ intellectual disability/Autism with self injurious behavior, HLD, hypothyroidism, GERD, seizure disorder, presents with fever and dry cough x 4 days, and acute worsening of SOB, found to have acute hypoxic resp failure due to multifocal PNA 2/2 COVID-19 (positive at 3/23). C/b bradycardia.

## 2020-03-29 NOTE — PROGRESS NOTE ADULT - PROBLEM SELECTOR PLAN 2
Intermittent jose to 40's. tsh nl  - EKG today (HR 60-70's) revealed bigeminy.   - Will replete lytes  - Patient asymptomatic, monitor HR Intermittent jose to 40's. tsh nl, no episodes of significant bradycardia overnight. Pt HD stable  - EKG (HR 60-70's) revealed bigeminy previously    - Will replete lytes  - Patient asymptomatic, monitor HR

## 2020-03-29 NOTE — PROGRESS NOTE ADULT - PROBLEM/PLAN-6
Detail Level: Detailed Detail Level: Generalized Hide Additional Notes?: No Detail Level: Zone DISPLAY PLAN FREE TEXT

## 2020-03-29 NOTE — PROGRESS NOTE ADULT - PROBLEM SELECTOR PLAN 3
CT chest revealed multifocal PNA, Likely 2/2 COVID-19  - S/p vanc and Zosyn x 1 in ED  - Off abx now, only on hydroxychloroquine; QTC wnl 3/26  - Wean O2 as tolerated  - Supportive care for fever and cough

## 2020-03-29 NOTE — PROGRESS NOTE ADULT - SUBJECTIVE AND OBJECTIVE BOX
Patient is a 30y old  Male who presents with a chief complaint of SOB (28 Mar 2020 07:07)      SUBJECTIVE / OVERNIGHT EVENTS:  **note in progress    MEDICATIONS  (STANDING):  ascorbic acid 1000 milliGRAM(s) Oral daily  desmopressin 0.2 milliGRAM(s) Oral at bedtime  diVALproex  milliGRAM(s) Oral <User Schedule>  enoxaparin Injectable 40 milliGRAM(s) SubCutaneous daily  levothyroxine 50 MICROGram(s) Oral daily  OXcarbazepine 600 milliGRAM(s) Oral <User Schedule>  OXcarbazepine 1200 milliGRAM(s) Oral <User Schedule>  oxybutynin 5 milliGRAM(s) Oral three times a day  thiothixene 10 milliGRAM(s) Oral <User Schedule>  zinc sulfate 220 milliGRAM(s) Oral daily    MEDICATIONS  (PRN):  acetaminophen   Tablet .. 650 milliGRAM(s) Oral every 6 hours PRN Temp greater or equal to 38C (100.4F)  diazepam    Tablet 10 milliGRAM(s) Oral daily PRN for anxiety      Vital Signs Last 24 Hrs  T(C): 36.1 (29 Mar 2020 06:15), Max: 37 (28 Mar 2020 21:21)  T(F): 96.9 (29 Mar 2020 06:15), Max: 98.6 (28 Mar 2020 21:21)  HR: 60 (29 Mar 2020 06:15) (56 - 73)  BP: 92/62 (29 Mar 2020 06:15) (92/62 - 119/79)  BP(mean): --  RR: 20 (29 Mar 2020 06:15) (20 - 20)  SpO2: 95% (29 Mar 2020 06:15) (93% - 96%)    CAPILLARY BLOOD GLUCOSE        I&O's Summary    28 Mar 2020 07:01  -  29 Mar 2020 07:00  --------------------------------------------------------  IN: 0 mL / OUT: 0 mL / NET: 0 mL        PHYSICAL EXAM:  GENERAL: NAD, well-developed  HEAD:  Atraumatic, Normocephalic  EYES: EOMI, PERRLA, conjunctiva and sclera clear  NECK: Supple, No JVD  CHEST/LUNG: Clear to auscultation bilaterally; No wheeze  HEART: Regular rate and rhythm; No murmurs, rubs, or gallops  ABDOMEN: Soft, Nontender, Nondistended; Bowel sounds present  EXTREMITIES:  2+ Peripheral Pulses, No clubbing, cyanosis, or edema  PSYCH: AAOx3  NEUROLOGY: non-focal  SKIN: No rashes or lesions    LABS:                        13.5   4.97  )-----------( 139      ( 28 Mar 2020 07:05 )             41.6     Auto Eosinophil # x     / Auto Eosinophil % x     / Auto Neutrophil # x     / Auto Neutrophil % x     / BANDS % x        03-28    140  |  103  |  18  ----------------------------<  88  4.8   |  23  |  0.55  03-28    138  |  101  |  18  ----------------------------<  81  4.4   |  23  |  0.61    Ca    8.8      28 Mar 2020 13:27  Mg     2.2     03-28  Phos  3.6     03-28  TPro  6.8  /  Alb  3.3  /  TBili  0.3  /  DBili  x   /  AST  41<H>  /  ALT  37  /  AlkPhos  59  03-28                RESPIRATORY  VENT:    ABG:     VBG:     RADIOLOGY & ADDITIONAL TESTS:  (Imaging Personally Reviewed)    Consultant(s) Notes Reviewed:      Care Discussed with Consultants/Other Providers: Patient is a 30y old  Male who presents with a chief complaint of SOB (28 Mar 2020 07:07)      SUBJECTIVE / OVERNIGHT EVENTS:  Pt seen and examined at bedside. No acute events overnight. Pt did not answer questions so unable to obtain ROS.     MEDICATIONS  (STANDING):  ascorbic acid 1000 milliGRAM(s) Oral daily  desmopressin 0.2 milliGRAM(s) Oral at bedtime  diVALproex  milliGRAM(s) Oral <User Schedule>  enoxaparin Injectable 40 milliGRAM(s) SubCutaneous daily  levothyroxine 50 MICROGram(s) Oral daily  OXcarbazepine 600 milliGRAM(s) Oral <User Schedule>  OXcarbazepine 1200 milliGRAM(s) Oral <User Schedule>  oxybutynin 5 milliGRAM(s) Oral three times a day  thiothixene 10 milliGRAM(s) Oral <User Schedule>  zinc sulfate 220 milliGRAM(s) Oral daily    MEDICATIONS  (PRN):  acetaminophen   Tablet .. 650 milliGRAM(s) Oral every 6 hours PRN Temp greater or equal to 38C (100.4F)  diazepam    Tablet 10 milliGRAM(s) Oral daily PRN for anxiety      Vital Signs Last 24 Hrs  T(C): 36.1 (29 Mar 2020 06:15), Max: 37 (28 Mar 2020 21:21)  T(F): 96.9 (29 Mar 2020 06:15), Max: 98.6 (28 Mar 2020 21:21)  HR: 60 (29 Mar 2020 06:15) (56 - 73)  BP: 92/62 (29 Mar 2020 06:15) (92/62 - 119/79)  BP(mean): --  RR: 20 (29 Mar 2020 06:15) (20 - 20)  SpO2: 95% (29 Mar 2020 06:15) (93% - 96%)    CAPILLARY BLOOD GLUCOSE        I&O's Summary    28 Mar 2020 07:01  -  29 Mar 2020 07:00  --------------------------------------------------------  IN: 0 mL / OUT: 0 mL / NET: 0 mL        PHYSICAL EXAM:  CONSTITUTIONAL: NAD, well-developed  RESPIRATORY: Normal respiratory effort on RA, does not appear to be tachypneic   CARDIOVASCULAR: Regular rate and rhythm, normal S1 and S2; No lower extremity edema;  ABDOMEN: Nontender to palpation, normoactive bowel sounds, no rebound/guarding; No hepatosplenomegaly  MUSCULOSKELETAL no clubbing or cyanosis of digits; no joint swelling or tenderness to palpation  PSYCH: A+O x 0; Minimally verbal baseline  NEURO: Non-focal, no tremors  SKIN: No rashes      LABS:                        13.5   4.97  )-----------( 139      ( 28 Mar 2020 07:05 )             41.6     Auto Eosinophil # x     / Auto Eosinophil % x     / Auto Neutrophil # x     / Auto Neutrophil % x     / BANDS % x        03-28    140  |  103  |  18  ----------------------------<  88  4.8   |  23  |  0.55  03-28    138  |  101  |  18  ----------------------------<  81  4.4   |  23  |  0.61    Ca    8.8      28 Mar 2020 13:27  Mg     2.2     03-28  Phos  3.6     03-28  TPro  6.8  /  Alb  3.3  /  TBili  0.3  /  DBili  x   /  AST  41<H>  /  ALT  37  /  AlkPhos  59  03-28                RESPIRATORY  VENT:    ABG:     VBG:     RADIOLOGY & ADDITIONAL TESTS:  (Imaging Personally Reviewed): LUIS    Consultant(s) Notes Reviewed:  LUIS    Care Discussed with Consultants/Other Providers: LUIS

## 2020-03-29 NOTE — PROGRESS NOTE ADULT - PROBLEM SELECTOR PLAN 1
COVID-19 (+) 3/23  - Hydroxychloroquine 400 mg BID x 1 day, then 200 mg BID x 4 days (3/23 - 28)  - AZT not required per ID.   - Daily EKG for QTC; wnl 3/25-26  - Wean O2 as tolerated; currently on 2L with SaO2 92-95%, may have a component of SHAWN.  - trial vit c and zinc po COVID-19 (+) 3/23  - s/p 5 days Hydroxychloroquine  - weaned off nasal cannula  - c/w trial vit c and zinc po

## 2020-03-29 NOTE — PROGRESS NOTE ADULT - PROBLEM SELECTOR PLAN 5
DVT ppx: Lovenox 40 mg daily  Diet: Regular  Activity: as tolerated  Dispo: Pending clinical w/u; May have difficulty with home quarantine 2/2 living in group home with full time aides. DVT ppx: Lovenox 40 mg daily  Diet: Regular  Activity: as tolerated  Dispo: dc planning - May have difficulty with home quarantine 2/2 living in group home with full time aides.

## 2020-03-29 NOTE — PROGRESS NOTE ADULT - PROBLEM SELECTOR PLAN 6
1. Name of PCP:   2. PCP contacted on admission: [  ] Y   [ x ] N  3. PCP contacted at Discharge: [  ] Y   [  ] N  4. Post-Discharge Appointment Date and Location:   5 Summary of Handoff given to PCP:

## 2020-03-30 LAB
ALBUMIN SERPL ELPH-MCNC: 3.5 G/DL — SIGNIFICANT CHANGE UP (ref 3.3–5)
ALP SERPL-CCNC: 68 U/L — SIGNIFICANT CHANGE UP (ref 40–120)
ALT FLD-CCNC: 54 U/L — HIGH (ref 10–45)
ANION GAP SERPL CALC-SCNC: 11 MMOL/L — SIGNIFICANT CHANGE UP (ref 5–17)
AST SERPL-CCNC: 45 U/L — HIGH (ref 10–40)
BILIRUB SERPL-MCNC: 0.3 MG/DL — SIGNIFICANT CHANGE UP (ref 0.2–1.2)
BUN SERPL-MCNC: 20 MG/DL — SIGNIFICANT CHANGE UP (ref 7–23)
CALCIUM SERPL-MCNC: 9.2 MG/DL — SIGNIFICANT CHANGE UP (ref 8.4–10.5)
CHLORIDE SERPL-SCNC: 100 MMOL/L — SIGNIFICANT CHANGE UP (ref 96–108)
CO2 SERPL-SCNC: 27 MMOL/L — SIGNIFICANT CHANGE UP (ref 22–31)
CREAT SERPL-MCNC: 0.72 MG/DL — SIGNIFICANT CHANGE UP (ref 0.5–1.3)
GLUCOSE SERPL-MCNC: 177 MG/DL — HIGH (ref 70–99)
HCT VFR BLD CALC: 41.4 % — SIGNIFICANT CHANGE UP (ref 39–50)
HGB BLD-MCNC: 13.5 G/DL — SIGNIFICANT CHANGE UP (ref 13–17)
MAGNESIUM SERPL-MCNC: 2.1 MG/DL — SIGNIFICANT CHANGE UP (ref 1.6–2.6)
MCHC RBC-ENTMCNC: 30.2 PG — SIGNIFICANT CHANGE UP (ref 27–34)
MCHC RBC-ENTMCNC: 32.6 GM/DL — SIGNIFICANT CHANGE UP (ref 32–36)
MCV RBC AUTO: 92.6 FL — SIGNIFICANT CHANGE UP (ref 80–100)
NRBC # BLD: 0 /100 WBCS — SIGNIFICANT CHANGE UP (ref 0–0)
PHOSPHATE SERPL-MCNC: 2.5 MG/DL — SIGNIFICANT CHANGE UP (ref 2.5–4.5)
PLATELET # BLD AUTO: 223 K/UL — SIGNIFICANT CHANGE UP (ref 150–400)
POTASSIUM SERPL-MCNC: 4.2 MMOL/L — SIGNIFICANT CHANGE UP (ref 3.5–5.3)
POTASSIUM SERPL-SCNC: 4.2 MMOL/L — SIGNIFICANT CHANGE UP (ref 3.5–5.3)
PROT SERPL-MCNC: 7 G/DL — SIGNIFICANT CHANGE UP (ref 6–8.3)
RBC # BLD: 4.47 M/UL — SIGNIFICANT CHANGE UP (ref 4.2–5.8)
RBC # FLD: 12.9 % — SIGNIFICANT CHANGE UP (ref 10.3–14.5)
SODIUM SERPL-SCNC: 138 MMOL/L — SIGNIFICANT CHANGE UP (ref 135–145)
WBC # BLD: 5.4 K/UL — SIGNIFICANT CHANGE UP (ref 3.8–10.5)
WBC # FLD AUTO: 5.4 K/UL — SIGNIFICANT CHANGE UP (ref 3.8–10.5)

## 2020-03-30 PROCEDURE — 99232 SBSQ HOSP IP/OBS MODERATE 35: CPT | Mod: GC

## 2020-03-30 RX ADMIN — Medication 1000 MILLIGRAM(S): at 11:44

## 2020-03-30 RX ADMIN — Medication 5 MILLIGRAM(S): at 06:56

## 2020-03-30 RX ADMIN — Medication 5 MILLIGRAM(S): at 21:25

## 2020-03-30 RX ADMIN — ENOXAPARIN SODIUM 40 MILLIGRAM(S): 100 INJECTION SUBCUTANEOUS at 11:43

## 2020-03-30 RX ADMIN — DIVALPROEX SODIUM 500 MILLIGRAM(S): 500 TABLET, DELAYED RELEASE ORAL at 06:30

## 2020-03-30 RX ADMIN — OXCARBAZEPINE 1200 MILLIGRAM(S): 300 TABLET, FILM COATED ORAL at 17:43

## 2020-03-30 RX ADMIN — Medication 5 MILLIGRAM(S): at 14:38

## 2020-03-30 RX ADMIN — OXCARBAZEPINE 600 MILLIGRAM(S): 300 TABLET, FILM COATED ORAL at 06:55

## 2020-03-30 RX ADMIN — ZINC SULFATE TAB 220 MG (50 MG ZINC EQUIVALENT) 220 MILLIGRAM(S): 220 (50 ZN) TAB at 11:44

## 2020-03-30 RX ADMIN — DIVALPROEX SODIUM 500 MILLIGRAM(S): 500 TABLET, DELAYED RELEASE ORAL at 17:43

## 2020-03-30 RX ADMIN — Medication 10 MILLIGRAM(S): at 22:58

## 2020-03-30 RX ADMIN — Medication 50 MICROGRAM(S): at 06:55

## 2020-03-30 RX ADMIN — Medication 10 MILLIGRAM(S): at 21:25

## 2020-03-30 RX ADMIN — DESMOPRESSIN ACETATE 0.2 MILLIGRAM(S): 0.1 TABLET ORAL at 21:25

## 2020-03-30 NOTE — PROGRESS NOTE ADULT - PROBLEM SELECTOR PLAN 6
1. Name of PCP:   2. PCP contacted on admission: [  ] Y   [ x ] N  3. PCP contacted at Discharge: [  ] Y   [  ] N  4. Post-Discharge Appointment Date and Location:   5 Summary of Handoff given to PCP: 1. Name of PCP: unknown   2. PCP contacted on admission: [  ] Y   [ x ] N  3. PCP contacted at Discharge: [  ] Y   [  ] N  4. Post-Discharge Appointment Date and Location:   5 Summary of Handoff given to PCP:

## 2020-03-30 NOTE — PROGRESS NOTE ADULT - PROBLEM SELECTOR PLAN 2
Intermittent jose to 40's. tsh nl, no episodes of significant bradycardia overnight. Pt HD stable  - EKG (HR 60-70's) revealed bigeminy previously    - Will replete lytes  - Patient asymptomatic, monitor HR

## 2020-03-30 NOTE — PROGRESS NOTE ADULT - PROBLEM SELECTOR PLAN 5
DVT ppx: Lovenox 40 mg daily  Diet: Regular  Activity: as tolerated  Dispo: d/c planning - May have difficulty with home quarantine 2/2 living in group home with full time aides. DVT ppx: Lovenox 40 mg daily  Diet: Regular  Activity: as tolerated  Dispo: d/c planning - May have difficulty with home quarantine 2/2 living in group home with full time aides. Spoke to Jeffery from , pt will not be accepted back until 4/6

## 2020-03-30 NOTE — PROGRESS NOTE ADULT - ASSESSMENT
30M w/ intellectual disability/Autism with self injurious behavior, HLD, hypothyroidism, GERD, seizure disorder, presents with fever and dry cough x 4 days, and acute worsening of SOB, found to have acute hypoxic resp failure due to multifocal PNA 2/2 COVID-19 (positive at 3/23). C/b bradycardia. 30M w/ intellectual disability/Autism with self injurious behavior, HLD, hypothyroidism, GERD, seizure disorder, presents with fever and dry cough x 4 days, and acute worsening of SOB, found to have acute hypoxic resp failure due to multifocal PNA 2/2 COVID-19 (positive at 3/23). C/b bradycardia, asymptomatic. Now pending placement back to group home, likely on 4/6.

## 2020-03-30 NOTE — PROGRESS NOTE ADULT - PROBLEM SELECTOR PLAN 3
CT chest revealed multifocal PNA, Likely 2/2 COVID-19  - S/p vanc and Zosyn x 1 in ED  - Off abx now, s/p hydroxychloroquine; QTC wnl 3/26  - Currently on room air   - Supportive care for fever and cough

## 2020-03-30 NOTE — PROGRESS NOTE ADULT - PROBLEM SELECTOR PLAN 1
COVID-19 (+) 3/23  - s/p 5 days Hydroxychloroquine  - weaned off nasal cannula, now on room air   - c/w trial vit c and zinc po COVID-19 (+) 3/23  - s/p 5 days Hydroxychloroquine  - weaned off nasal cannula, now on room air   - c/w trial vit c and zinc po (to end on 4/2)

## 2020-03-30 NOTE — PROGRESS NOTE ADULT - SUBJECTIVE AND OBJECTIVE BOX
PROGRESS NOTE:   Authored by Dr. Jailyn Lay MD Pager [phone] [facility], [id number] [address]     Patient is a 30y old  Male who presents with a chief complaint of SOB (29 Mar 2020 07:58)      SUBJECTIVE / OVERNIGHT EVENTS:  Pt seen and examined at bedside. No acute events overnight. Pt did not answer questions so unable to obtain ROS.     ROS: unable to assess     MEDICATIONS  (STANDING):  ascorbic acid 1000 milliGRAM(s) Oral daily  desmopressin 0.2 milliGRAM(s) Oral at bedtime  diVALproex  milliGRAM(s) Oral <User Schedule>  enoxaparin Injectable 40 milliGRAM(s) SubCutaneous daily  levothyroxine 50 MICROGram(s) Oral daily  OXcarbazepine 600 milliGRAM(s) Oral <User Schedule>  OXcarbazepine 1200 milliGRAM(s) Oral <User Schedule>  oxybutynin 5 milliGRAM(s) Oral three times a day  thiothixene 10 milliGRAM(s) Oral <User Schedule>  zinc sulfate 220 milliGRAM(s) Oral daily    MEDICATIONS  (PRN):  acetaminophen   Tablet .. 650 milliGRAM(s) Oral every 6 hours PRN Temp greater or equal to 38C (100.4F)  diazepam    Tablet 10 milliGRAM(s) Oral daily PRN for anxiety      CAPILLARY BLOOD GLUCOSE        I&O's Summary    29 Mar 2020 07:01  -  30 Mar 2020 07:00  --------------------------------------------------------  IN: 0 mL / OUT: 0 mL / NET: 0 mL        PHYSICAL EXAM:  Vital Signs Last 24 Hrs  T(C): 36.4 (30 Mar 2020 06:51), Max: 36.8 (29 Mar 2020 09:26)  T(F): 97.5 (30 Mar 2020 06:51), Max: 98.2 (29 Mar 2020 09:26)  HR: 74 (30 Mar 2020 06:51) (66 - 80)  BP: 111/72 (30 Mar 2020 06:51) (100/70 - 126/74)  BP(mean): --  RR: 19 (30 Mar 2020 06:51) (19 - 20)  SpO2: 92% (30 Mar 2020 06:51) (92% - 95%)    CONSTITUTIONAL: NAD, well-developed  RESPIRATORY: Normal respiratory effort on RA, does not appear to be tachypneic   CARDIOVASCULAR: Regular rate and rhythm, normal S1 and S2; No lower extremity edema;  ABDOMEN: Nontender to palpation, normoactive bowel sounds, no rebound/guarding; No hepatosplenomegaly  MUSCULOSKELETAL no clubbing or cyanosis of digits; no joint swelling or tenderness to palpation  PSYCH: A+O x 0; Minimally verbal baseline  NEURO: Non-focal, no tremors  SKIN: No rashes    LABS:                         RADIOLOGY & ADDITIONAL TESTS:  Results Reviewed:   Imaging Personally Reviewed:  Electrocardiogram Personally Reviewed:    COORDINATION OF CARE:  Care Discussed with Consultants/Other Providers [Y/N]:  Prior or Outpatient Records Reviewed [Y/N]: PROGRESS NOTE:   Authored by Dr. Jailyn Lay MD Pager [phone] [facility], [address] [address]     Patient is a 30y old  Male who presents with a chief complaint of SOB (29 Mar 2020 07:58)      SUBJECTIVE / OVERNIGHT EVENTS:  Pt seen and examined at bedside. No acute events overnight. Pt did not answer questions so unable to obtain ROS.     ROS: unable to assess     MEDICATIONS  (STANDING):  ascorbic acid 1000 milliGRAM(s) Oral daily  desmopressin 0.2 milliGRAM(s) Oral at bedtime  diVALproex  milliGRAM(s) Oral <User Schedule>  enoxaparin Injectable 40 milliGRAM(s) SubCutaneous daily  levothyroxine 50 MICROGram(s) Oral daily  OXcarbazepine 600 milliGRAM(s) Oral <User Schedule>  OXcarbazepine 1200 milliGRAM(s) Oral <User Schedule>  oxybutynin 5 milliGRAM(s) Oral three times a day  thiothixene 10 milliGRAM(s) Oral <User Schedule>  zinc sulfate 220 milliGRAM(s) Oral daily    MEDICATIONS  (PRN):  acetaminophen   Tablet .. 650 milliGRAM(s) Oral every 6 hours PRN Temp greater or equal to 38C (100.4F)  diazepam    Tablet 10 milliGRAM(s) Oral daily PRN for anxiety      CAPILLARY BLOOD GLUCOSE        I&O's Summary    29 Mar 2020 07:01  -  30 Mar 2020 07:00  --------------------------------------------------------  IN: 0 mL / OUT: 0 mL / NET: 0 mL        PHYSICAL EXAM:  Vital Signs Last 24 Hrs  T(C): 36.4 (30 Mar 2020 06:51), Max: 36.8 (29 Mar 2020 09:26)  T(F): 97.5 (30 Mar 2020 06:51), Max: 98.2 (29 Mar 2020 09:26)  HR: 74 (30 Mar 2020 06:51) (66 - 80)  BP: 111/72 (30 Mar 2020 06:51) (100/70 - 126/74)  BP(mean): --  RR: 19 (30 Mar 2020 06:51) (19 - 20)  SpO2: 92% (30 Mar 2020 06:51) (92% - 95%)    CONSTITUTIONAL: NAD, well-developed  RESPIRATORY: Normal respiratory effort on RA, does not appear to be tachypneic   CARDIOVASCULAR: Regular rate and rhythm, normal S1 and S2; No lower extremity edema;  ABDOMEN: Nontender to palpation, normoactive bowel sounds, no rebound/guarding; No hepatosplenomegaly  MUSCULOSKELETAL no clubbing or cyanosis of digits; no joint swelling or tenderness to palpation  PSYCH: A+O x 0; Minimally verbal baseline  NEURO: Non-focal, no tremors  SKIN: No rashes    LABS:                                   13.5   5.40  )-----------( 223      ( 30 Mar 2020 10:55 )             41.4     03-30    138  |  100  |  20  ----------------------------<  177<H>  4.2   |  27  |  0.72    Ca    9.2      30 Mar 2020 10:55  Phos  2.5     03-30  Mg     2.1     03-30    TPro  7.0  /  Alb  3.5  /  TBili  0.3  /  DBili  x   /  AST  45<H>  /  ALT  54<H>  /  AlkPhos  68  03-30          RADIOLOGY & ADDITIONAL TESTS:  Results Reviewed:   Imaging Personally Reviewed:  Electrocardiogram Personally Reviewed:    COORDINATION OF CARE:  Care Discussed with Consultants/Other Providers [Y/N]:  Prior or Outpatient Records Reviewed [Y/N]:

## 2020-03-31 DIAGNOSIS — J18.9 PNEUMONIA, UNSPECIFIED ORGANISM: ICD-10-CM

## 2020-03-31 PROCEDURE — 99232 SBSQ HOSP IP/OBS MODERATE 35: CPT | Mod: GC

## 2020-03-31 RX ORDER — DIAZEPAM 5 MG
10 TABLET ORAL DAILY
Refills: 0 | Status: DISCONTINUED | OUTPATIENT
Start: 2020-03-31 | End: 2020-04-06

## 2020-03-31 RX ADMIN — Medication 1000 MILLIGRAM(S): at 13:02

## 2020-03-31 RX ADMIN — Medication 5 MILLIGRAM(S): at 05:44

## 2020-03-31 RX ADMIN — ENOXAPARIN SODIUM 40 MILLIGRAM(S): 100 INJECTION SUBCUTANEOUS at 13:02

## 2020-03-31 RX ADMIN — Medication 50 MICROGRAM(S): at 05:43

## 2020-03-31 RX ADMIN — Medication 5 MILLIGRAM(S): at 21:27

## 2020-03-31 RX ADMIN — OXCARBAZEPINE 1200 MILLIGRAM(S): 300 TABLET, FILM COATED ORAL at 17:32

## 2020-03-31 RX ADMIN — Medication 5 MILLIGRAM(S): at 13:02

## 2020-03-31 RX ADMIN — DIVALPROEX SODIUM 500 MILLIGRAM(S): 500 TABLET, DELAYED RELEASE ORAL at 17:32

## 2020-03-31 RX ADMIN — OXCARBAZEPINE 600 MILLIGRAM(S): 300 TABLET, FILM COATED ORAL at 05:43

## 2020-03-31 RX ADMIN — ZINC SULFATE TAB 220 MG (50 MG ZINC EQUIVALENT) 220 MILLIGRAM(S): 220 (50 ZN) TAB at 13:02

## 2020-03-31 RX ADMIN — Medication 10 MILLIGRAM(S): at 21:26

## 2020-03-31 RX ADMIN — DESMOPRESSIN ACETATE 0.2 MILLIGRAM(S): 0.1 TABLET ORAL at 21:26

## 2020-03-31 RX ADMIN — DIVALPROEX SODIUM 500 MILLIGRAM(S): 500 TABLET, DELAYED RELEASE ORAL at 05:50

## 2020-03-31 NOTE — PROGRESS NOTE ADULT - PROBLEM SELECTOR PLAN 1
- CT chest revealed multifocal PNA, 2/2 COVID-19 (+) 3/23  - s/p 5 days Hydroxychloroquine  - weaned off nasal cannula, now on room air   - c/w trial vit c and zinc po (to end on 4/2)  - Supportive care for fever and cough

## 2020-03-31 NOTE — PROGRESS NOTE ADULT - PROBLEM SELECTOR PLAN 4
DVT ppx: Lovenox 40 mg daily  Diet: Regular  Activity: as tolerated  Dispo: d/c planning - May have difficulty with home quarantine 2/2 living in group home with full time aides. Spoke to Jeffery from , pt will not be accepted back until 4/6

## 2020-03-31 NOTE — PROGRESS NOTE ADULT - SUBJECTIVE AND OBJECTIVE BOX
PROGRESS NOTE:   Authored by Dr. Jailyn Lay MD Pager [phone] [facility], [address] [address]     Patient is a 30y old  Male who presents with a chief complaint of SOB (30 Mar 2020 07:53)    SUBJECTIVE / OVERNIGHT EVENTS: Pt seen and examined at bedside. No acute events overnight. Pt did not answer questions so unable to obtain ROS.     ROS: unable to assess     MEDICATIONS  (STANDING):  ascorbic acid 1000 milliGRAM(s) Oral daily  desmopressin 0.2 milliGRAM(s) Oral at bedtime  diVALproex  milliGRAM(s) Oral <User Schedule>  enoxaparin Injectable 40 milliGRAM(s) SubCutaneous daily  levothyroxine 50 MICROGram(s) Oral daily  OXcarbazepine 600 milliGRAM(s) Oral <User Schedule>  OXcarbazepine 1200 milliGRAM(s) Oral <User Schedule>  oxybutynin 5 milliGRAM(s) Oral three times a day  thiothixene 10 milliGRAM(s) Oral <User Schedule>  zinc sulfate 220 milliGRAM(s) Oral daily    MEDICATIONS  (PRN):  acetaminophen   Tablet .. 650 milliGRAM(s) Oral every 6 hours PRN Temp greater or equal to 38C (100.4F)      CAPILLARY BLOOD GLUCOSE        I&O's Summary    30 Mar 2020 07:01  -  31 Mar 2020 07:00  --------------------------------------------------------  IN: 220 mL / OUT: 0 mL / NET: 220 mL        PHYSICAL EXAM:  Vital Signs Last 24 Hrs  T(C): 36.4 (31 Mar 2020 04:43), Max: 36.7 (30 Mar 2020 17:05)  T(F): 97.5 (31 Mar 2020 04:43), Max: 98.1 (30 Mar 2020 17:05)  HR: 72 (31 Mar 2020 04:43) (66 - 87)  BP: 119/81 (31 Mar 2020 04:43) (108/60 - 119/81)  BP(mean): --  RR: 20 (31 Mar 2020 04:43) (20 - 20)  SpO2: 92% (31 Mar 2020 04:43) (92% - 96%)    CONSTITUTIONAL: NAD, well-developed  RESPIRATORY: Normal respiratory effort on RA, does not appear to be tachypneic   CARDIOVASCULAR: Regular rate and rhythm, normal S1 and S2; No lower extremity edema;  ABDOMEN: Nontender to palpation, normoactive bowel sounds, no rebound/guarding; No hepatosplenomegaly  MUSCULOSKELETAL no clubbing or cyanosis of digits; no joint swelling or tenderness to palpation  PSYCH: A+O x 0; Minimally verbal baseline  NEURO: Non-focal, no tremors  SKIN: No rashes      LABS:    No labs        RADIOLOGY & ADDITIONAL TESTS:  Results Reviewed:   Imaging Personally Reviewed:  Electrocardiogram Personally Reviewed:    COORDINATION OF CARE:  Care Discussed with Consultants/Other Providers [Y/N]:  Prior or Outpatient Records Reviewed [Y/N]:

## 2020-03-31 NOTE — PROGRESS NOTE ADULT - ASSESSMENT
30M w/ intellectual disability/Autism with self injurious behavior, HLD, hypothyroidism, GERD, seizure disorder, presents with fever and dry cough x 4 days, and acute worsening of SOB, found to have acute hypoxic resp failure due to multifocal PNA 2/2 COVID-19 (positive on 3/23). C/b bradycardia, asymptomatic. Now pending placement back to group home, likely on 4/6.

## 2020-03-31 NOTE — PROGRESS NOTE ADULT - PROBLEM SELECTOR PLAN 2
Intermittent jose to 40's. tsh nl, no episodes of significant bradycardia overnight. Pt HD stable  - EKG (HR 60-70's) revealed bigeminy previously    - Will replete lytes  - Patient asymptomatic, monitor HR  - Pt had MCOT device but he ripped it off

## 2020-04-01 LAB
ALBUMIN SERPL ELPH-MCNC: 3.7 G/DL — SIGNIFICANT CHANGE UP (ref 3.3–5)
ALP SERPL-CCNC: 76 U/L — SIGNIFICANT CHANGE UP (ref 40–120)
ALT FLD-CCNC: 66 U/L — HIGH (ref 10–45)
ANION GAP SERPL CALC-SCNC: 14 MMOL/L — SIGNIFICANT CHANGE UP (ref 5–17)
AST SERPL-CCNC: 43 U/L — HIGH (ref 10–40)
BILIRUB SERPL-MCNC: 0.2 MG/DL — SIGNIFICANT CHANGE UP (ref 0.2–1.2)
BUN SERPL-MCNC: 18 MG/DL — SIGNIFICANT CHANGE UP (ref 7–23)
CALCIUM SERPL-MCNC: 9.5 MG/DL — SIGNIFICANT CHANGE UP (ref 8.4–10.5)
CHLORIDE SERPL-SCNC: 100 MMOL/L — SIGNIFICANT CHANGE UP (ref 96–108)
CO2 SERPL-SCNC: 23 MMOL/L — SIGNIFICANT CHANGE UP (ref 22–31)
CREAT SERPL-MCNC: 0.61 MG/DL — SIGNIFICANT CHANGE UP (ref 0.5–1.3)
CRP SERPL-MCNC: 0.86 MG/DL — HIGH (ref 0–0.4)
FERRITIN SERPL-MCNC: 665 NG/ML — HIGH (ref 30–400)
GLUCOSE SERPL-MCNC: 97 MG/DL — SIGNIFICANT CHANGE UP (ref 70–99)
HCT VFR BLD CALC: 41.1 % — SIGNIFICANT CHANGE UP (ref 39–50)
HGB BLD-MCNC: 13.9 G/DL — SIGNIFICANT CHANGE UP (ref 13–17)
MAGNESIUM SERPL-MCNC: 1.9 MG/DL — SIGNIFICANT CHANGE UP (ref 1.6–2.6)
MCHC RBC-ENTMCNC: 30.9 PG — SIGNIFICANT CHANGE UP (ref 27–34)
MCHC RBC-ENTMCNC: 33.8 GM/DL — SIGNIFICANT CHANGE UP (ref 32–36)
MCV RBC AUTO: 91.3 FL — SIGNIFICANT CHANGE UP (ref 80–100)
NRBC # BLD: 0 /100 WBCS — SIGNIFICANT CHANGE UP (ref 0–0)
PHOSPHATE SERPL-MCNC: 3.4 MG/DL — SIGNIFICANT CHANGE UP (ref 2.5–4.5)
PLATELET # BLD AUTO: 337 K/UL — SIGNIFICANT CHANGE UP (ref 150–400)
POTASSIUM SERPL-MCNC: 4.3 MMOL/L — SIGNIFICANT CHANGE UP (ref 3.5–5.3)
POTASSIUM SERPL-SCNC: 4.3 MMOL/L — SIGNIFICANT CHANGE UP (ref 3.5–5.3)
PROT SERPL-MCNC: 7.2 G/DL — SIGNIFICANT CHANGE UP (ref 6–8.3)
RBC # BLD: 4.5 M/UL — SIGNIFICANT CHANGE UP (ref 4.2–5.8)
RBC # FLD: 12.9 % — SIGNIFICANT CHANGE UP (ref 10.3–14.5)
SODIUM SERPL-SCNC: 137 MMOL/L — SIGNIFICANT CHANGE UP (ref 135–145)
WBC # BLD: 9.25 K/UL — SIGNIFICANT CHANGE UP (ref 3.8–10.5)
WBC # FLD AUTO: 9.25 K/UL — SIGNIFICANT CHANGE UP (ref 3.8–10.5)

## 2020-04-01 PROCEDURE — 99232 SBSQ HOSP IP/OBS MODERATE 35: CPT | Mod: GC

## 2020-04-01 RX ADMIN — Medication 5 MILLIGRAM(S): at 21:53

## 2020-04-01 RX ADMIN — OXCARBAZEPINE 600 MILLIGRAM(S): 300 TABLET, FILM COATED ORAL at 06:10

## 2020-04-01 RX ADMIN — Medication 10 MILLIGRAM(S): at 18:54

## 2020-04-01 RX ADMIN — Medication 10 MILLIGRAM(S): at 21:53

## 2020-04-01 RX ADMIN — Medication 50 MICROGRAM(S): at 05:38

## 2020-04-01 RX ADMIN — ENOXAPARIN SODIUM 40 MILLIGRAM(S): 100 INJECTION SUBCUTANEOUS at 13:29

## 2020-04-01 RX ADMIN — DIVALPROEX SODIUM 500 MILLIGRAM(S): 500 TABLET, DELAYED RELEASE ORAL at 17:09

## 2020-04-01 RX ADMIN — ZINC SULFATE TAB 220 MG (50 MG ZINC EQUIVALENT) 220 MILLIGRAM(S): 220 (50 ZN) TAB at 13:30

## 2020-04-01 RX ADMIN — Medication 1000 MILLIGRAM(S): at 13:29

## 2020-04-01 RX ADMIN — Medication 5 MILLIGRAM(S): at 13:30

## 2020-04-01 RX ADMIN — Medication 5 MILLIGRAM(S): at 05:38

## 2020-04-01 RX ADMIN — DESMOPRESSIN ACETATE 0.2 MILLIGRAM(S): 0.1 TABLET ORAL at 21:53

## 2020-04-01 RX ADMIN — OXCARBAZEPINE 1200 MILLIGRAM(S): 300 TABLET, FILM COATED ORAL at 17:09

## 2020-04-01 RX ADMIN — DIVALPROEX SODIUM 500 MILLIGRAM(S): 500 TABLET, DELAYED RELEASE ORAL at 06:10

## 2020-04-01 NOTE — PROGRESS NOTE ADULT - PROBLEM SELECTOR PLAN 2
Intermittent jose to 40's. tsh nl, revealed bigeminy previously, no episodes of significant bradycardia overnight. Pt HD stable  - HR 60-90's   - Will replete lytes  - Patient asymptomatic, monitor HR  - Pt had MCOT device but he ripped it off Intermittent jose to 40's. tsh nl, revealed bigeminy previously, no episodes of significant bradycardia overnight. Pt HD stable  - HR 60-90's   - Patient asymptomatic, monitor HR  - Pt had MCOT device but he ripped it off

## 2020-04-01 NOTE — PROGRESS NOTE ADULT - PROBLEM SELECTOR PLAN 5
1. Name of PCP: unknown   2. PCP contacted on admission: [  ] Y   [ x ] N  3. PCP contacted at Discharge: [  ] Y   [  ] N  4. Post-Discharge Appointment Date and Location:   5 Summary of Handoff given to PCP:

## 2020-04-01 NOTE — PROGRESS NOTE ADULT - SUBJECTIVE AND OBJECTIVE BOX
Abdirizak Piedra  PGY-1, Internal Medicine, Team  p. [phone]    SUBJECTIVE / OVERNIGHT EVENTS:  Patient seen and examined at bedside.    Other Review of Systems Negative.    MEDICATIONS  (STANDING):  ascorbic acid 1000 milliGRAM(s) Oral daily  desmopressin 0.2 milliGRAM(s) Oral at bedtime  diVALproex  milliGRAM(s) Oral <User Schedule>  enoxaparin Injectable 40 milliGRAM(s) SubCutaneous daily  levothyroxine 50 MICROGram(s) Oral daily  OXcarbazepine 600 milliGRAM(s) Oral <User Schedule>  OXcarbazepine 1200 milliGRAM(s) Oral <User Schedule>  oxybutynin 5 milliGRAM(s) Oral three times a day  thiothixene 10 milliGRAM(s) Oral <User Schedule>  zinc sulfate 220 milliGRAM(s) Oral daily    MEDICATIONS  (PRN):  acetaminophen   Tablet .. 650 milliGRAM(s) Oral every 6 hours PRN Temp greater or equal to 38C (100.4F)  diazepam    Tablet 10 milliGRAM(s) Oral daily PRN for anxiety      OBJECTIVE:    Vital Signs Last 24 Hrs  T(C): 36.6 (01 Apr 2020 06:26), Max: 36.8 (31 Mar 2020 22:33)  T(F): 97.9 (01 Apr 2020 06:26), Max: 98.2 (31 Mar 2020 22:33)  HR: 78 (01 Apr 2020 06:26) (66 - 93)  BP: 94/62 (01 Apr 2020 06:26) (94/62 - 110/68)  BP(mean): --  RR: 18 (01 Apr 2020 06:26) (18 - 20)  SpO2: 92% (01 Apr 2020 06:26) (92% - 94%)    CAPILLARY BLOOD GLUCOSE        I&O's Summary    31 Mar 2020 07:01  -  01 Apr 2020 07:00  --------------------------------------------------------  IN: 920 mL / OUT: 0 mL / NET: 920 mL        PHYSICAL EXAM:  GENERAL: NAD, well-developed  HEAD:  Atraumatic, Normocephalic  EYES: EOMI, PERRLA, conjunctiva and sclera clear  NECK: Supple, No JVD  CHEST/LUNG: Clear to auscultation bilaterally; No wheeze  HEART: Regular rate and rhythm; No murmurs, rubs, or gallops  ABDOMEN: Soft, Nontender, Nondistended; Bowel sounds present  EXTREMITIES:  2+ Peripheral Pulses, No clubbing, cyanosis, or edema  PSYCH: AAOx3  NEUROLOGY: non-focal  SKIN: No rashes or lesions    LABS:                        13.5   5.40  )-----------( 223      ( 30 Mar 2020 10:55 )             41.4     Auto Eosinophil # x     / Auto Eosinophil % x     / Auto Neutrophil # x     / Auto Neutrophil % x     / BANDS % x        03-30    138  |  100  |  20  ----------------------------<  177<H>  4.2   |  27  |  0.72    Ca    9.2      30 Mar 2020 10:55  Mg     2.1     03-30  Phos  2.5     03-30  TPro  7.0  /  Alb  3.5  /  TBili  0.3  /  DBili  x   /  AST  45<H>  /  ALT  54<H>  /  AlkPhos  68  03-30        Care Discussed with Consultants/Other Providers:    RADIOLOGY & ADDITIONAL TESTS:  (Imaging Personally Reviewed) Abdirizak Piedra  PGY-1, Internal Medicine, Team  p. [phone]    SUBJECTIVE / OVERNIGHT EVENTS:  Patient seen and examined at bedside. No acute events. Pt did not answer questions so unable to obtain ROS.     Other Review of Systems Negative.    MEDICATIONS  (STANDING):  ascorbic acid 1000 milliGRAM(s) Oral daily  desmopressin 0.2 milliGRAM(s) Oral at bedtime  diVALproex  milliGRAM(s) Oral <User Schedule>  enoxaparin Injectable 40 milliGRAM(s) SubCutaneous daily  levothyroxine 50 MICROGram(s) Oral daily  OXcarbazepine 600 milliGRAM(s) Oral <User Schedule>  OXcarbazepine 1200 milliGRAM(s) Oral <User Schedule>  oxybutynin 5 milliGRAM(s) Oral three times a day  thiothixene 10 milliGRAM(s) Oral <User Schedule>  zinc sulfate 220 milliGRAM(s) Oral daily    MEDICATIONS  (PRN):  acetaminophen   Tablet .. 650 milliGRAM(s) Oral every 6 hours PRN Temp greater or equal to 38C (100.4F)  diazepam    Tablet 10 milliGRAM(s) Oral daily PRN for anxiety      OBJECTIVE:    Vital Signs Last 24 Hrs  T(C): 36.6 (01 Apr 2020 06:26), Max: 36.8 (31 Mar 2020 22:33)  T(F): 97.9 (01 Apr 2020 06:26), Max: 98.2 (31 Mar 2020 22:33)  HR: 78 (01 Apr 2020 06:26) (66 - 93)  BP: 94/62 (01 Apr 2020 06:26) (94/62 - 110/68)  BP(mean): --  RR: 18 (01 Apr 2020 06:26) (18 - 20)  SpO2: 92% (01 Apr 2020 06:26) (92% - 94%)    CAPILLARY BLOOD GLUCOSE        I&O's Summary    31 Mar 2020 07:01  -  01 Apr 2020 07:00  --------------------------------------------------------  IN: 920 mL / OUT: 0 mL / NET: 920 mL        PHYSICAL EXAM:  GENERAL: NAD, well-developed  HEAD:  Atraumatic, Normocephalic  EYES: EOMI, PERRLA, conjunctiva and sclera clear  NECK: Supple, No JVD  CHEST/LUNG: Clear to auscultation bilaterally; No wheeze  HEART: Regular rate and rhythm; No murmurs, rubs, or gallops  ABDOMEN: Soft, Nontender, Nondistended; Bowel sounds present  EXTREMITIES:  2+ Peripheral Pulses, No clubbing, cyanosis, or edema  PSYCH: AAOx3  NEUROLOGY: non-focal  SKIN: No rashes or lesions    LABS:                        13.5   5.40  )-----------( 223      ( 30 Mar 2020 10:55 )             41.4     Auto Eosinophil # x     / Auto Eosinophil % x     / Auto Neutrophil # x     / Auto Neutrophil % x     / BANDS % x        03-30    138  |  100  |  20  ----------------------------<  177<H>  4.2   |  27  |  0.72    Ca    9.2      30 Mar 2020 10:55  Mg     2.1     03-30  Phos  2.5     03-30  TPro  7.0  /  Alb  3.5  /  TBili  0.3  /  DBili  x   /  AST  45<H>  /  ALT  54<H>  /  AlkPhos  68  03-30        Care Discussed with Consultants/Other Providers:    RADIOLOGY & ADDITIONAL TESTS:  (Imaging Personally Reviewed)

## 2020-04-02 LAB
ANION GAP SERPL CALC-SCNC: 14 MMOL/L — SIGNIFICANT CHANGE UP (ref 5–17)
BASOPHILS # BLD AUTO: 0 K/UL — SIGNIFICANT CHANGE UP (ref 0–0.2)
BASOPHILS NFR BLD AUTO: 0 % — SIGNIFICANT CHANGE UP (ref 0–2)
BUN SERPL-MCNC: 19 MG/DL — SIGNIFICANT CHANGE UP (ref 7–23)
CALCIUM SERPL-MCNC: 9.6 MG/DL — SIGNIFICANT CHANGE UP (ref 8.4–10.5)
CHLORIDE SERPL-SCNC: 101 MMOL/L — SIGNIFICANT CHANGE UP (ref 96–108)
CO2 SERPL-SCNC: 26 MMOL/L — SIGNIFICANT CHANGE UP (ref 22–31)
CREAT SERPL-MCNC: 0.57 MG/DL — SIGNIFICANT CHANGE UP (ref 0.5–1.3)
EOSINOPHIL # BLD AUTO: 0.25 K/UL — SIGNIFICANT CHANGE UP (ref 0–0.5)
EOSINOPHIL NFR BLD AUTO: 2.6 % — SIGNIFICANT CHANGE UP (ref 0–6)
GLUCOSE SERPL-MCNC: 82 MG/DL — SIGNIFICANT CHANGE UP (ref 70–99)
HCT VFR BLD CALC: 42.1 % — SIGNIFICANT CHANGE UP (ref 39–50)
HGB BLD-MCNC: 14.2 G/DL — SIGNIFICANT CHANGE UP (ref 13–17)
LYMPHOCYTES # BLD AUTO: 2.68 K/UL — SIGNIFICANT CHANGE UP (ref 1–3.3)
LYMPHOCYTES # BLD AUTO: 27.8 % — SIGNIFICANT CHANGE UP (ref 13–44)
MAGNESIUM SERPL-MCNC: 1.9 MG/DL — SIGNIFICANT CHANGE UP (ref 1.6–2.6)
MCHC RBC-ENTMCNC: 31.1 PG — SIGNIFICANT CHANGE UP (ref 27–34)
MCHC RBC-ENTMCNC: 33.7 GM/DL — SIGNIFICANT CHANGE UP (ref 32–36)
MCV RBC AUTO: 92.3 FL — SIGNIFICANT CHANGE UP (ref 80–100)
MONOCYTES # BLD AUTO: 1.26 K/UL — HIGH (ref 0–0.9)
MONOCYTES NFR BLD AUTO: 13.1 % — SIGNIFICANT CHANGE UP (ref 2–14)
NEUTROPHILS # BLD AUTO: 4.78 K/UL — SIGNIFICANT CHANGE UP (ref 1.8–7.4)
NEUTROPHILS NFR BLD AUTO: 49.6 % — SIGNIFICANT CHANGE UP (ref 43–77)
PHOSPHATE SERPL-MCNC: 3.9 MG/DL — SIGNIFICANT CHANGE UP (ref 2.5–4.5)
PLATELET # BLD AUTO: 384 K/UL — SIGNIFICANT CHANGE UP (ref 150–400)
POTASSIUM SERPL-MCNC: 4.6 MMOL/L — SIGNIFICANT CHANGE UP (ref 3.5–5.3)
POTASSIUM SERPL-SCNC: 4.6 MMOL/L — SIGNIFICANT CHANGE UP (ref 3.5–5.3)
RBC # BLD: 4.56 M/UL — SIGNIFICANT CHANGE UP (ref 4.2–5.8)
RBC # FLD: 13.2 % — SIGNIFICANT CHANGE UP (ref 10.3–14.5)
SODIUM SERPL-SCNC: 141 MMOL/L — SIGNIFICANT CHANGE UP (ref 135–145)
WBC # BLD: 9.64 K/UL — SIGNIFICANT CHANGE UP (ref 3.8–10.5)
WBC # FLD AUTO: 9.64 K/UL — SIGNIFICANT CHANGE UP (ref 3.8–10.5)

## 2020-04-02 PROCEDURE — 99232 SBSQ HOSP IP/OBS MODERATE 35: CPT | Mod: GC

## 2020-04-02 RX ADMIN — ZINC SULFATE TAB 220 MG (50 MG ZINC EQUIVALENT) 220 MILLIGRAM(S): 220 (50 ZN) TAB at 13:08

## 2020-04-02 RX ADMIN — DESMOPRESSIN ACETATE 0.2 MILLIGRAM(S): 0.1 TABLET ORAL at 21:17

## 2020-04-02 RX ADMIN — Medication 5 MILLIGRAM(S): at 13:08

## 2020-04-02 RX ADMIN — Medication 10 MILLIGRAM(S): at 20:35

## 2020-04-02 RX ADMIN — DIVALPROEX SODIUM 500 MILLIGRAM(S): 500 TABLET, DELAYED RELEASE ORAL at 18:26

## 2020-04-02 RX ADMIN — Medication 10 MILLIGRAM(S): at 21:17

## 2020-04-02 RX ADMIN — OXCARBAZEPINE 600 MILLIGRAM(S): 300 TABLET, FILM COATED ORAL at 05:53

## 2020-04-02 RX ADMIN — Medication 50 MICROGRAM(S): at 05:53

## 2020-04-02 RX ADMIN — Medication 5 MILLIGRAM(S): at 05:53

## 2020-04-02 RX ADMIN — ENOXAPARIN SODIUM 40 MILLIGRAM(S): 100 INJECTION SUBCUTANEOUS at 13:07

## 2020-04-02 RX ADMIN — Medication 5 MILLIGRAM(S): at 21:17

## 2020-04-02 RX ADMIN — OXCARBAZEPINE 1200 MILLIGRAM(S): 300 TABLET, FILM COATED ORAL at 18:26

## 2020-04-02 RX ADMIN — DIVALPROEX SODIUM 500 MILLIGRAM(S): 500 TABLET, DELAYED RELEASE ORAL at 05:53

## 2020-04-02 NOTE — PROGRESS NOTE ADULT - PROBLEM SELECTOR PLAN 2
Intermittent jose to 40's. tsh nl, revealed bigeminy previously, no episodes of significant bradycardia overnight. Pt HD stable  - HR 60-90's   - Patient asymptomatic, monitor HR  - Pt had MCOT device but he ripped it off

## 2020-04-02 NOTE — PROGRESS NOTE ADULT - SUBJECTIVE AND OBJECTIVE BOX
Abdirizak Piedra  PGY-1, Internal Medicine, Team  p. [phone]    SUBJECTIVE / OVERNIGHT EVENTS:  Patient seen and examined at bedside.    Other Review of Systems Negative.    MEDICATIONS  (STANDING):  desmopressin 0.2 milliGRAM(s) Oral at bedtime  diVALproex  milliGRAM(s) Oral <User Schedule>  enoxaparin Injectable 40 milliGRAM(s) SubCutaneous daily  levothyroxine 50 MICROGram(s) Oral daily  OXcarbazepine 600 milliGRAM(s) Oral <User Schedule>  OXcarbazepine 1200 milliGRAM(s) Oral <User Schedule>  oxybutynin 5 milliGRAM(s) Oral three times a day  thiothixene 10 milliGRAM(s) Oral <User Schedule>  zinc sulfate 220 milliGRAM(s) Oral daily    MEDICATIONS  (PRN):  acetaminophen   Tablet .. 650 milliGRAM(s) Oral every 6 hours PRN Temp greater or equal to 38C (100.4F)  diazepam    Tablet 10 milliGRAM(s) Oral daily PRN for anxiety      OBJECTIVE:    Vital Signs Last 24 Hrs  T(C): 36.8 (02 Apr 2020 05:10), Max: 36.8 (02 Apr 2020 05:10)  T(F): 98.3 (02 Apr 2020 05:10), Max: 98.3 (02 Apr 2020 05:10)  HR: 82 (02 Apr 2020 05:10) (82 - 101)  BP: 120/78 (02 Apr 2020 05:10) (118/73 - 120/78)  BP(mean): --  RR: 18 (02 Apr 2020 05:10) (18 - 19)  SpO2: 91% (02 Apr 2020 05:10) (90% - 93%)    CAPILLARY BLOOD GLUCOSE        I&O's Summary    01 Apr 2020 07:01  -  02 Apr 2020 07:00  --------------------------------------------------------  IN: 700 mL / OUT: 0 mL / NET: 700 mL        PHYSICAL EXAM:  GENERAL: NAD, well-developed  HEAD:  Atraumatic, Normocephalic  EYES: EOMI, PERRLA, conjunctiva and sclera clear  NECK: Supple, No JVD  CHEST/LUNG: Clear to auscultation bilaterally; No wheeze  HEART: Regular rate and rhythm; No murmurs, rubs, or gallops  ABDOMEN: Soft, Nontender, Nondistended; Bowel sounds present  EXTREMITIES:  2+ Peripheral Pulses, No clubbing, cyanosis, or edema  PSYCH: AAOx3  NEUROLOGY: non-focal  SKIN: No rashes or lesions    LABS:                        13.9   9.25  )-----------( 337      ( 01 Apr 2020 07:40 )             41.1     Auto Eosinophil # x     / Auto Eosinophil % x     / Auto Neutrophil # x     / Auto Neutrophil % x     / BANDS % x        04-01    137  |  100  |  18  ----------------------------<  97  4.3   |  23  |  0.61    Ca    9.5      01 Apr 2020 07:35  Mg     1.9     04-01  Phos  3.4     04-01  TPro  7.2  /  Alb  3.7  /  TBili  0.2  /  DBili  x   /  AST  43<H>  /  ALT  66<H>  /  AlkPhos  76  04-01                  ABG:     VBG:       Care Discussed with Consultants/Other Providers:    RADIOLOGY & ADDITIONAL TESTS:  (Imaging Personally Reviewed)

## 2020-04-03 PROCEDURE — 99232 SBSQ HOSP IP/OBS MODERATE 35: CPT | Mod: GC

## 2020-04-03 RX ADMIN — Medication 50 MICROGRAM(S): at 06:15

## 2020-04-03 RX ADMIN — Medication 5 MILLIGRAM(S): at 21:57

## 2020-04-03 RX ADMIN — Medication 10 MILLIGRAM(S): at 19:54

## 2020-04-03 RX ADMIN — DESMOPRESSIN ACETATE 0.2 MILLIGRAM(S): 0.1 TABLET ORAL at 21:57

## 2020-04-03 RX ADMIN — DIVALPROEX SODIUM 500 MILLIGRAM(S): 500 TABLET, DELAYED RELEASE ORAL at 17:11

## 2020-04-03 RX ADMIN — Medication 5 MILLIGRAM(S): at 15:02

## 2020-04-03 RX ADMIN — Medication 10 MILLIGRAM(S): at 17:19

## 2020-04-03 RX ADMIN — OXCARBAZEPINE 600 MILLIGRAM(S): 300 TABLET, FILM COATED ORAL at 06:15

## 2020-04-03 RX ADMIN — OXCARBAZEPINE 1200 MILLIGRAM(S): 300 TABLET, FILM COATED ORAL at 17:11

## 2020-04-03 RX ADMIN — ENOXAPARIN SODIUM 40 MILLIGRAM(S): 100 INJECTION SUBCUTANEOUS at 12:33

## 2020-04-03 RX ADMIN — Medication 5 MILLIGRAM(S): at 06:15

## 2020-04-03 RX ADMIN — DIVALPROEX SODIUM 500 MILLIGRAM(S): 500 TABLET, DELAYED RELEASE ORAL at 06:15

## 2020-04-04 PROCEDURE — 99232 SBSQ HOSP IP/OBS MODERATE 35: CPT | Mod: GC

## 2020-04-04 PROCEDURE — 93010 ELECTROCARDIOGRAM REPORT: CPT

## 2020-04-04 RX ORDER — HALOPERIDOL DECANOATE 100 MG/ML
5 INJECTION INTRAMUSCULAR ONCE
Refills: 0 | Status: DISCONTINUED | OUTPATIENT
Start: 2020-04-04 | End: 2020-04-06

## 2020-04-04 RX ORDER — HALOPERIDOL DECANOATE 100 MG/ML
5 INJECTION INTRAMUSCULAR ONCE
Refills: 0 | Status: COMPLETED | OUTPATIENT
Start: 2020-04-04 | End: 2020-04-04

## 2020-04-04 RX ADMIN — OXCARBAZEPINE 600 MILLIGRAM(S): 300 TABLET, FILM COATED ORAL at 06:34

## 2020-04-04 RX ADMIN — DIVALPROEX SODIUM 500 MILLIGRAM(S): 500 TABLET, DELAYED RELEASE ORAL at 06:34

## 2020-04-04 RX ADMIN — ENOXAPARIN SODIUM 40 MILLIGRAM(S): 100 INJECTION SUBCUTANEOUS at 12:05

## 2020-04-04 RX ADMIN — Medication 5 MILLIGRAM(S): at 19:42

## 2020-04-04 RX ADMIN — DESMOPRESSIN ACETATE 0.2 MILLIGRAM(S): 0.1 TABLET ORAL at 19:42

## 2020-04-04 RX ADMIN — Medication 5 MILLIGRAM(S): at 12:05

## 2020-04-04 RX ADMIN — Medication 10 MILLIGRAM(S): at 19:42

## 2020-04-04 RX ADMIN — Medication 5 MILLIGRAM(S): at 06:34

## 2020-04-04 RX ADMIN — Medication 10 MILLIGRAM(S): at 16:50

## 2020-04-04 RX ADMIN — DIVALPROEX SODIUM 500 MILLIGRAM(S): 500 TABLET, DELAYED RELEASE ORAL at 18:01

## 2020-04-04 RX ADMIN — OXCARBAZEPINE 1200 MILLIGRAM(S): 300 TABLET, FILM COATED ORAL at 18:01

## 2020-04-04 RX ADMIN — Medication 50 MICROGRAM(S): at 06:34

## 2020-04-04 RX ADMIN — HALOPERIDOL DECANOATE 5 MILLIGRAM(S): 100 INJECTION INTRAMUSCULAR at 13:08

## 2020-04-04 NOTE — PROGRESS NOTE ADULT - SUBJECTIVE AND OBJECTIVE BOX
PROGRESS NOTE:     CONTACT INFO:  Anita De La Garza MD  [id number]    Patient is a 30y old  Male who presents with a chief complaint of SOB (03 Apr 2020 08:52)      SUBJECTIVE / OVERNIGHT EVENTS:  No acute events overnight. Patient seen and evaluated at bedside. No fever/chills.  Denies SOB at rest, chest pain, palpitations, abdominal pain, nausea/vomiting  Patient sitting in bed, intermittently yelling for food. Not aggresive or combative.     ADDITIONAL REVIEW OF SYSTEMS:    MEDICATIONS  (STANDING):  desmopressin 0.2 milliGRAM(s) Oral at bedtime  diVALproex  milliGRAM(s) Oral <User Schedule>  enoxaparin Injectable 40 milliGRAM(s) SubCutaneous daily  levothyroxine 50 MICROGram(s) Oral daily  OXcarbazepine 600 milliGRAM(s) Oral <User Schedule>  OXcarbazepine 1200 milliGRAM(s) Oral <User Schedule>  oxybutynin 5 milliGRAM(s) Oral three times a day  thiothixene 10 milliGRAM(s) Oral <User Schedule>    MEDICATIONS  (PRN):  acetaminophen   Tablet .. 650 milliGRAM(s) Oral every 6 hours PRN Temp greater or equal to 38C (100.4F)  diazepam    Tablet 10 milliGRAM(s) Oral daily PRN for anxiety      CAPILLARY BLOOD GLUCOSE        I&O's Summary    03 Apr 2020 07:01  -  04 Apr 2020 07:00  --------------------------------------------------------  IN: 940 mL / OUT: 0 mL / NET: 940 mL    04 Apr 2020 07:01  -  04 Apr 2020 11:55  --------------------------------------------------------  IN: 100 mL / OUT: 0 mL / NET: 100 mL        PHYSICAL EXAM:  Vital Signs Last 24 Hrs  T(C): 37.2 (04 Apr 2020 05:00), Max: 37.2 (04 Apr 2020 05:00)  T(F): 99 (04 Apr 2020 05:00), Max: 99 (04 Apr 2020 05:00)  HR: 97 (04 Apr 2020 05:00) (97 - 100)  BP: 123/82 (04 Apr 2020 05:00) (118/78 - 123/82)  BP(mean): --  RR: 18 (04 Apr 2020 05:00) (17 - 18)  SpO2: 93% (04 Apr 2020 05:00) (92% - 93%)    CONSTITUTIONAL: NAD, well-developed, sitting in bed  patient is not cooperative with examination. overall appears well. speaking without any tachypnea.    RADIOLOGY & ADDITIONAL TESTS:  Results Reviewed:   Imaging Personally Reviewed:  Electrocardiogram Personally Reviewed:    COORDINATION OF CARE:  Care Discussed with Consultants/Other Providers [Y/N]:  Prior or Outpatient Records Reviewed [Y/N]:

## 2020-04-04 NOTE — PROGRESS NOTE ADULT - PROBLEM SELECTOR PLAN 2
- resolved- HR in the 80s-100s  - HR 60-90's   - Patient asymptomatic, monitor HR  - Pt had MCOT device but he ripped it off

## 2020-04-04 NOTE — PROVIDER CONTACT NOTE (OTHER) - ACTION/TREATMENT ORDERED:
haldol 5mg IM given  Ativan 10 mg Po  given
MD Burden notified, follow up with team and pass onto day RN. will continue to monitor
Dr. Sanchez aware, continue to monitor patient for fever and watch O2 sat. Will continue to monitor.
Ice packs applied pt on continuous pulse ox as per orders. Cont to monitor temp pt resting comfortably in bed call bell within reach.
Spectra [phone] called with no answer. Will continue to monitor Pt.

## 2020-04-05 RX ADMIN — DIVALPROEX SODIUM 500 MILLIGRAM(S): 500 TABLET, DELAYED RELEASE ORAL at 18:39

## 2020-04-05 RX ADMIN — DESMOPRESSIN ACETATE 0.2 MILLIGRAM(S): 0.1 TABLET ORAL at 21:37

## 2020-04-05 RX ADMIN — Medication 5 MILLIGRAM(S): at 13:51

## 2020-04-05 RX ADMIN — Medication 2 MILLIGRAM(S): at 21:37

## 2020-04-05 RX ADMIN — OXCARBAZEPINE 600 MILLIGRAM(S): 300 TABLET, FILM COATED ORAL at 06:16

## 2020-04-05 RX ADMIN — Medication 5 MILLIGRAM(S): at 06:16

## 2020-04-05 RX ADMIN — OXCARBAZEPINE 1200 MILLIGRAM(S): 300 TABLET, FILM COATED ORAL at 18:39

## 2020-04-05 RX ADMIN — ENOXAPARIN SODIUM 40 MILLIGRAM(S): 100 INJECTION SUBCUTANEOUS at 13:51

## 2020-04-05 RX ADMIN — DIVALPROEX SODIUM 500 MILLIGRAM(S): 500 TABLET, DELAYED RELEASE ORAL at 06:16

## 2020-04-05 RX ADMIN — Medication 5 MILLIGRAM(S): at 21:37

## 2020-04-05 RX ADMIN — Medication 50 MICROGRAM(S): at 06:16

## 2020-04-05 RX ADMIN — Medication 10 MILLIGRAM(S): at 21:55

## 2020-04-05 NOTE — PROGRESS NOTE ADULT - SUBJECTIVE AND OBJECTIVE BOX
[facility] Division of Internal Medicine  Daniel Rose, PGY-1  Pager: [facility]: [phone] | [facility]: [phone]    Patient is a 30y old  Male who presents with a chief complaint of SOB (04 Apr 2020 11:54)      SUBJECTIVE / OVERNIGHT EVENTS: No acute events overnight   ADDITIONAL REVIEW OF SYSTEMS: unable to assess ROS due to medical condition     MEDICATIONS  (STANDING):  desmopressin 0.2 milliGRAM(s) Oral at bedtime  diVALproex  milliGRAM(s) Oral <User Schedule>  enoxaparin Injectable 40 milliGRAM(s) SubCutaneous daily  levothyroxine 50 MICROGram(s) Oral daily  OXcarbazepine 600 milliGRAM(s) Oral <User Schedule>  OXcarbazepine 1200 milliGRAM(s) Oral <User Schedule>  oxybutynin 5 milliGRAM(s) Oral three times a day  thiothixene 10 milliGRAM(s) Oral <User Schedule>    MEDICATIONS  (PRN):  acetaminophen   Tablet .. 650 milliGRAM(s) Oral every 6 hours PRN Temp greater or equal to 38C (100.4F)  diazepam    Tablet 10 milliGRAM(s) Oral daily PRN for anxiety  haloperidol    Injectable 5 milliGRAM(s) IntraMuscular once PRN agitation      CAPILLARY BLOOD GLUCOSE        I&O's Summary    04 Apr 2020 07:01  -  05 Apr 2020 07:00  --------------------------------------------------------  IN: 220 mL / OUT: 0 mL / NET: 220 mL        PHYSICAL EXAM:  Vital Signs Last 24 Hrs  T(C): 36.7 (05 Apr 2020 06:45), Max: 37 (04 Apr 2020 21:51)  T(F): 98 (05 Apr 2020 06:45), Max: 98.6 (04 Apr 2020 21:51)  HR: 72 (05 Apr 2020 06:45) (72 - 99)  BP: 102/65 (05 Apr 2020 06:45) (102/65 - 130/81)  BP(mean): --  RR: 20 (05 Apr 2020 06:45) (20 - 20)  SpO2: 94% (05 Apr 2020 06:45) (92% - 94%)      LABS:                      RADIOLOGY & ADDITIONAL TESTS:  Results Reviewed:   Imaging Personally Reviewed:  Electrocardiogram Personally Reviewed:    COORDINATION OF CARE:  Care Discussed with Consultants/Other Providers [Y/N]:  Prior or Outpatient Records Reviewed [Y/N]:

## 2020-04-05 NOTE — PROGRESS NOTE ADULT - ATTENDING COMMENTS
30 year old male with Autism and seizure disorder admitted with COVID-19    Currently saturating at 94% on RA  Completed Hydroxychloroquine course  d/c pending due to placement issues back to group home currently .
30M w/ intellectual disability/Autism with self injurious behavior, seizure disorder aw PNA 2/2 COVID-19. Now improved, on room air, pending placement back to group home, likely on 4/6.
30M with  intellectual disability/Autism with self injurious behavior, seizure disorder aw PNA 2/2 COVID-19. Now improved, on room air, pending placement back to group home, likely on 4/6.
Placement pending.
30M with intellectual disability/autism with self-injurious behavior, seizure disorder admitted with PNA 2/2 COVID-19. Now improved, on room air, pending placement back to group home, likely tomorrow (4/6).
30 year old male with Autism and seizure disorder admitted with COVID-19    Currently saturating at 94% on RA  Completed Hydroxychloroquine course  Continue vit C and zinc   Issues regarding d/c back to group home need to addressed
Clinical status stable. Placement pending.
d/w resident, exam adopted from resident note  acute hypoxic resp failure 2/2 COVID19+, completed plaquenil, now weaned off nasal cannula, c/w trial of po zinc and vit c .  d/c planning back to group home, medically stable now
Viral pneumonia with acute hypoxic respiratory failure secondary to COVID-19.  Clinically stable, continue to wean down O2 as tolerated.  CRP trending down as well.
d/w resident, exam adopted from resident note  acute hypoxic resp failure 2/2 COVID19+, c/w plaquenil, nasal cannula and wean off as tolerates, trial of po zinc and vit c

## 2020-04-06 ENCOUNTER — TRANSCRIPTION ENCOUNTER (OUTPATIENT)
Age: 30
End: 2020-04-06

## 2020-04-06 VITALS
HEART RATE: 98 BPM | SYSTOLIC BLOOD PRESSURE: 100 MMHG | TEMPERATURE: 98 F | DIASTOLIC BLOOD PRESSURE: 64 MMHG | OXYGEN SATURATION: 92 % | RESPIRATION RATE: 20 BRPM

## 2020-04-06 PROCEDURE — 83615 LACTATE (LD) (LDH) ENZYME: CPT

## 2020-04-06 PROCEDURE — 85652 RBC SED RATE AUTOMATED: CPT

## 2020-04-06 PROCEDURE — 84443 ASSAY THYROID STIM HORMONE: CPT

## 2020-04-06 PROCEDURE — 82550 ASSAY OF CK (CPK): CPT

## 2020-04-06 PROCEDURE — 85379 FIBRIN DEGRADATION QUANT: CPT

## 2020-04-06 PROCEDURE — 82947 ASSAY GLUCOSE BLOOD QUANT: CPT

## 2020-04-06 PROCEDURE — 83735 ASSAY OF MAGNESIUM: CPT

## 2020-04-06 PROCEDURE — 85730 THROMBOPLASTIN TIME PARTIAL: CPT

## 2020-04-06 PROCEDURE — 82435 ASSAY OF BLOOD CHLORIDE: CPT

## 2020-04-06 PROCEDURE — 82330 ASSAY OF CALCIUM: CPT

## 2020-04-06 PROCEDURE — U0001: CPT

## 2020-04-06 PROCEDURE — 84145 PROCALCITONIN (PCT): CPT

## 2020-04-06 PROCEDURE — 96375 TX/PRO/DX INJ NEW DRUG ADDON: CPT

## 2020-04-06 PROCEDURE — 82955 ASSAY OF G6PD ENZYME: CPT

## 2020-04-06 PROCEDURE — 85027 COMPLETE CBC AUTOMATED: CPT

## 2020-04-06 PROCEDURE — 87040 BLOOD CULTURE FOR BACTERIA: CPT

## 2020-04-06 PROCEDURE — 85014 HEMATOCRIT: CPT

## 2020-04-06 PROCEDURE — 80048 BASIC METABOLIC PNL TOTAL CA: CPT

## 2020-04-06 PROCEDURE — 83036 HEMOGLOBIN GLYCOSYLATED A1C: CPT

## 2020-04-06 PROCEDURE — 80053 COMPREHEN METABOLIC PANEL: CPT

## 2020-04-06 PROCEDURE — 86360 T CELL ABSOLUTE COUNT/RATIO: CPT

## 2020-04-06 PROCEDURE — 85610 PROTHROMBIN TIME: CPT

## 2020-04-06 PROCEDURE — 82803 BLOOD GASES ANY COMBINATION: CPT

## 2020-04-06 PROCEDURE — 83605 ASSAY OF LACTIC ACID: CPT

## 2020-04-06 PROCEDURE — 84132 ASSAY OF SERUM POTASSIUM: CPT

## 2020-04-06 PROCEDURE — 93005 ELECTROCARDIOGRAM TRACING: CPT

## 2020-04-06 PROCEDURE — 96374 THER/PROPH/DIAG INJ IV PUSH: CPT

## 2020-04-06 PROCEDURE — 84295 ASSAY OF SERUM SODIUM: CPT

## 2020-04-06 PROCEDURE — 71250 CT THORAX DX C-: CPT

## 2020-04-06 PROCEDURE — 82728 ASSAY OF FERRITIN: CPT

## 2020-04-06 PROCEDURE — 99285 EMERGENCY DEPT VISIT HI MDM: CPT | Mod: 25

## 2020-04-06 PROCEDURE — 86140 C-REACTIVE PROTEIN: CPT

## 2020-04-06 PROCEDURE — 84484 ASSAY OF TROPONIN QUANT: CPT

## 2020-04-06 PROCEDURE — 84100 ASSAY OF PHOSPHORUS: CPT

## 2020-04-06 PROCEDURE — 71045 X-RAY EXAM CHEST 1 VIEW: CPT

## 2020-04-06 RX ADMIN — Medication 50 MICROGRAM(S): at 05:31

## 2020-04-06 RX ADMIN — DIVALPROEX SODIUM 500 MILLIGRAM(S): 500 TABLET, DELAYED RELEASE ORAL at 05:31

## 2020-04-06 RX ADMIN — OXCARBAZEPINE 600 MILLIGRAM(S): 300 TABLET, FILM COATED ORAL at 05:31

## 2020-04-06 RX ADMIN — Medication 5 MILLIGRAM(S): at 05:31

## 2020-04-06 NOTE — PROGRESS NOTE ADULT - PROBLEM SELECTOR PLAN 1
- CT chest revealed multifocal PNA, 2/2 COVID-19 (+) 3/23  - s/p 5 days Hydroxychloroquine  - weaned off nasal cannula, now on room air   -s/p trial vit c and zinc po (ended on 4/2)  - Supportive care for fever and cough

## 2020-04-06 NOTE — DISCHARGE NOTE NURSING/CASE MANAGEMENT/SOCIAL WORK - PATIENT PORTAL LINK FT
You can access the FollowMyHealth Patient Portal offered by [facility] by registering at the following website: [email or web address]. By joining [employer]’s FollowMyHealth portal, you will also be able to view your health information using other applications (apps) compatible with our system.

## 2020-04-06 NOTE — DISCHARGE NOTE NURSING/CASE MANAGEMENT/SOCIAL WORK - NSDCVIVACCINE_GEN_ALL_CORE_FT
Tdap , 2017/3/10 09:27 , Rossy England (RN)  Tdap , 2017/5/17 19:03 , Daniella Rahman (RN)  Tdap , 2018/8/8 15:01 , Milton Muro (RN)

## 2020-04-06 NOTE — PROGRESS NOTE ADULT - SUBJECTIVE AND OBJECTIVE BOX
[facility] Division of Internal Medicine  Daniel Rose, PGY-1  Pager: [facility]: [phone] | [facility]: [phone]    Patient is a 30y old  Male who presents with a chief complaint of SOB (05 Apr 2020 07:48)      SUBJECTIVE / OVERNIGHT EVENTS: No acute events overnight   ADDITIONAL REVIEW OF SYSTEMS: unchanged from yesterday, unable to complete ROS 2/2 medical condition     MEDICATIONS  (STANDING):  desmopressin 0.2 milliGRAM(s) Oral at bedtime  diVALproex  milliGRAM(s) Oral <User Schedule>  enoxaparin Injectable 40 milliGRAM(s) SubCutaneous daily  levothyroxine 50 MICROGram(s) Oral daily  OXcarbazepine 600 milliGRAM(s) Oral <User Schedule>  OXcarbazepine 1200 milliGRAM(s) Oral <User Schedule>  oxybutynin 5 milliGRAM(s) Oral three times a day  thiothixene 10 milliGRAM(s) Oral <User Schedule>    MEDICATIONS  (PRN):  acetaminophen   Tablet .. 650 milliGRAM(s) Oral every 6 hours PRN Temp greater or equal to 38C (100.4F)  diazepam    Tablet 10 milliGRAM(s) Oral daily PRN for anxiety  haloperidol    Injectable 5 milliGRAM(s) IntraMuscular once PRN agitation      CAPILLARY BLOOD GLUCOSE        I&O's Summary    05 Apr 2020 07:01  -  06 Apr 2020 07:00  --------------------------------------------------------  IN: 1860 mL / OUT: 0 mL / NET: 1860 mL        PHYSICAL EXAM:  Vital Signs Last 24 Hrs  T(C): 36.5 (06 Apr 2020 05:19), Max: 36.8 (06 Apr 2020 00:11)  T(F): 97.7 (06 Apr 2020 05:19), Max: 98.3 (06 Apr 2020 00:11)  HR: 98 (06 Apr 2020 05:19) (87 - 98)  BP: 100/64 (06 Apr 2020 05:19) (93/67 - 116/74)  BP(mean): --  RR: 20 (06 Apr 2020 05:19) (20 - 20)  SpO2: 92% (06 Apr 2020 05:19) (91% - 92%)    General: NAD, on RA    LABS:                      RADIOLOGY & ADDITIONAL TESTS:  Results Reviewed:   Imaging Personally Reviewed:  Electrocardiogram Personally Reviewed:    COORDINATION OF CARE:  Care Discussed with Consultants/Other Providers [Y/N]:  Prior or Outpatient Records Reviewed [Y/N]:

## 2020-05-07 NOTE — PROGRESS NOTE ADULT - PROBLEM/PLAN-3
For prescription med change she would need a visit//either telemed or in person.  She can try OTC pepcid with the protonix   DISPLAY PLAN FREE TEXT

## 2020-06-23 NOTE — ED ADULT NURSE NOTE - HEART RATE (BEATS/MIN)
Subjective:       Patient ID: Vadim Sebastian is a 3 y.o. male.    Chief Complaint: Abdominal Pain    HPI  Review of Systems   Constitutional: Negative for activity change, appetite change and unexpected weight change.   HENT: Negative for congestion and trouble swallowing.    Respiratory: Negative for apnea, cough, choking, wheezing and stridor.    Cardiovascular: Negative for chest pain and cyanosis.   Gastrointestinal: Positive for abdominal pain. Negative for blood in stool and vomiting.   Endocrine: Negative for heat intolerance.   Genitourinary: Negative for decreased urine volume, difficulty urinating and dysuria.   Musculoskeletal: Negative for arthralgias, back pain, joint swelling, myalgias and neck stiffness.   Skin: Positive for rash. Negative for color change.   Allergic/Immunologic: Positive for environmental allergies and food allergies.   Neurological: Negative for seizures, weakness and headaches.   Hematological: Negative for adenopathy. Does not bruise/bleed easily.   Psychiatric/Behavioral: Negative for behavioral problems and sleep disturbance. The patient is not hyperactive.        Objective:      Physical Exam    Assessment:       1. Abdominal pain, unspecified abdominal location    2. Multiple food allergies    3. Family history of alpha 1 antitrypsin deficiency        Plan:         CHIEF COMPLAINT: Patient is here for follow up of abdominal pain and food allergies.    HISTORY OF PRESENT ILLNESS:  Patient follows up today for ongoing care of above symptoms.  Mom reports that patient is doing better as long as he takes the Periactin.  He gets it once a day.  She states within 2 or 3 days he was doing better.  If he starts missing the medicine he will get symptoms in that time period as well.  There is no vomiting.  Father has been diagnosed with alpha 1 antitrypsin deficiency.  Mom inquired about this.  There is no vomiting.  There is no jaundice.  There is no excessive bleeding.  Bowel movements  "are normal.  Stools done showed negative H pylori occult blood Giardia Cryptosporidium.  Patient has not been tested for alpha-1 antitrypsin deficiency.    STUDIES REVIEWED:  As above in HPI    MEDICATIONS/ALLERGIES: The patient's MedCard has been reviewed and/or reconciled.    PMH, SH, FH, all reviewed and no changes except as noted.    PHYSICAL EXAMINATION:   BP (!) 107/59   Pulse 91   Temp 98 °F (36.7 °C) (Tympanic)   Ht 3' 6.52" (1.08 m)   Wt 18.8 kg (41 lb 5.4 oz)   SpO2 98%   BMI 16.07 kg/m²    Remainder of vital signs unremarkable, please refer to vital signs sheet.  General: Alert, WN, WH, NAD  Chest: Clear to auscultation bilaterally.No increased work of breathing   Heart: Regular, rate and rhythm without murmur  Abdomen: Soft, non tender, non distended, no hepatosplenomegaly, no stool masses, no rebound or guarding.  Extremities: Symmetric, well perfused and no edema.      IMPRESSION/PLAN:  Patient follows up today for ongoing care above symptoms.  Clinically he is doing better.  We can certainly continue the Periactin.  I think it is reasonable to get labs as listed below including for alpha-1 antitrypsin deficiency celiac disease and transaminases.  We will monitor his weight closely.  His abdominal complaints are very functional in nature.  I will await the results of the labs for further recommendations.  I will see him back in 4-5 months.  Patient Instructions   Continue cyproheptadine  Labs today  Monitor weight  Follow up 4-5 months       This was discussed at length with parents who expressed understanding and agreement. Questions were answered.  This note has been dictated using voice recognition software.  Note sent to referring physician via [employer] or fax              " [phone]

## 2020-07-29 ENCOUNTER — TRANSCRIPTION ENCOUNTER (OUTPATIENT)
Age: 30
End: 2020-07-29

## 2020-11-11 NOTE — ED ADULT TRIAGE NOTE - NS ED TRIAGE AVPU SCALE
[FreeTextEntry1] : + covid, feeling better, however feels she does not have any stamina and has a stuffy nose\par \par chronic sinusitis\par at this point, patient must see specialist, pulm for further assessment and evaluation\par \par Patient was counseled on healthy eating habits, on daily exercise and stress relief. All medications and allergies were reviewed with the patient and any adjustments necessary were made. Patient was counseled to try engage in an exercise activity for at least 30 minutes 3-4 times a week.  Patient was advised to eat a diet low in fat and carbohydrates and high in protein, with plenty of vegetables. Patient was advised to try and engage in relaxing activities whenever possible.\par The patients blood was draw in office and will be followed and assessed for any issues.  Patient was told to return to the office if any issues arise.  Unless otherwise stated, the patient is to continue all other medications as previously prescribed.\par  Alert-The patient is alert, awake and responds to voice. The patient is oriented to time, place, and person. The triage nurse is able to obtain subjective information.

## 2020-11-27 NOTE — ED PROVIDER NOTE - SKIN TEMP
Medications:  1. Take 2.5mg metolazone tonight (Friday 11/27) and Sunday (11/29). Take an extra 60 mEq of potassium on those days only.    Instructions:  1. We will have you get lab work on Monday to recheck your kidney function. Orders will be sent to [facility].  2. Call us on Monday with your weight from that morning, and let us know how you're feeling.    Follow-up: (make these appointments before you leave)  1. Please follow-up with [facility] next week, with labs prior. Make that appointment on your way out today.       Page the [facility] Coordinator on call if you gain more than 3 lb in a day or 5 in a week. Please also page if you feel unwell or have alarms.     Great to see you in clinic today. To Page the VAD Coordinator on call, dial [phone] option #4 and ask to speak to the [facility] coordinator on call.      warm

## 2021-02-23 NOTE — ED ADULT TRIAGE NOTE - STATUS:
[facility] OPIC Progress Note    Date: 2021    Name: Bret Dela Cruz    MRN: [id number]         : 1968    Injectafer Infusion 1 of 2    Ms. Garsia to [facility], ambulatory, at 1130. Pt was assessed and education was provided. Ms. Garsia's vitals were reviewed and patient was observed for 5 minutes prior to treatment. Visit Vitals  /85   Pulse 78   Temp 97 °F (36.1 °C)   Resp 18   Wt 117.1 kg (258 lb 1.6 oz)   SpO2 98%   Breastfeeding No   BMI 38.11 kg/m²         24 g PIV placed in left hand x 1 attempt. PIV flushed easily and had brisk blood return. Injectafer 750 mg  In 250 ml NS infused over 20 minutes per order. VS stable and pt denied complaints of itching, lip/tongue/facial swelling, SOB, CP or other complaints. Patient observed for 30 minutes after infusion. No s/s of allergic reaction. Ms. Yojana Agustin tolerated the infusion, and had no complaints. VS remained stable. PIV flushed with NS 10 ml and removed catheter intact. No bleeding or hematoma noted at site. Zhanna and coban applied. Reviewed discharge instructions with patient, including expected side effects (abdominal cramping, nausea, changes in color of urine or feces) and signs of allergic reaction requiring medical attention (itching/hives/rashes, SOB, chest pain, lip/tongue/facial swelling). Patient given printed copy to take home. Patient verbalized understanding of discharge instructions. Patient armband removed and shredded. Ms. Garsia was discharged from [place] [address] in stable condition at 1315. She is to follow up with 2021 at 1300 for next infusion.     Sage Amos RN  2021  4:10 PM
Intact

## 2021-03-25 NOTE — ED ADULT NURSE NOTE - NS ED NOTE ABUSE RESPONSE YN
Can you find out if BP is doing alright (within normal limits)?  She can always scheduled FU with me PRN.  Thanks, AB   no

## 2021-04-01 NOTE — ED ADULT NURSE NOTE - NSFALLRSKASSESASSIST_ED_ALL_ED
CONSTITUTIONAL: WD,WN. NAD.    SKIN: Normal color and turgor.    HEAD: NC/AT.  EYES: Conjunctiva clear. EOMI. PERRL.    ENT: Airway clear. Normal voice.   RESPIRATORY:  Normal work of breathing. Lungs CTAB.  CARDIOVASCULAR:  RRR, S1S2. No M/R/G.  + JVD    GI:  Abdomen soft, nontender.    MSK: Neck supple.  +1 bilat LE edema.   NEURO: Alert and oriented; CN II-XII grossly intact. Speech clear. 5/5 strength in all extremities.  Good balance. Steady gait.
no

## 2021-06-01 NOTE — H&P PST ADULT - BP NONINVASIVE DIASTOLIC (MM HG)
Prescription set up in separate refill encounter to go over electronically. Printed prescription has been shredded. Patient notified that prescription has been sent.    Tarah Davila, CMA     78

## 2021-07-19 ENCOUNTER — EMERGENCY (EMERGENCY)
Facility: HOSPITAL | Age: 31
LOS: 1 days | Discharge: HOME | End: 2021-07-19
Attending: STUDENT IN AN ORGANIZED HEALTH CARE EDUCATION/TRAINING PROGRAM | Admitting: STUDENT IN AN ORGANIZED HEALTH CARE EDUCATION/TRAINING PROGRAM
Payer: MEDICAID

## 2021-07-19 VITALS
OXYGEN SATURATION: 100 % | HEART RATE: 110 BPM | SYSTOLIC BLOOD PRESSURE: 144 MMHG | TEMPERATURE: 98 F | DIASTOLIC BLOOD PRESSURE: 79 MMHG | RESPIRATION RATE: 18 BRPM | HEIGHT: 66 IN

## 2021-07-19 DIAGNOSIS — Z98.811 DENTAL RESTORATION STATUS: Chronic | ICD-10-CM

## 2021-07-19 PROCEDURE — 73610 X-RAY EXAM OF ANKLE: CPT | Mod: 26,LT

## 2021-07-19 PROCEDURE — 73502 X-RAY EXAM HIP UNI 2-3 VIEWS: CPT | Mod: 26,LT

## 2021-07-19 PROCEDURE — 73590 X-RAY EXAM OF LOWER LEG: CPT | Mod: 26,LT

## 2021-07-19 PROCEDURE — 73552 X-RAY EXAM OF FEMUR 2/>: CPT | Mod: 26,LT

## 2021-07-19 PROCEDURE — 72125 CT NECK SPINE W/O DYE: CPT | Mod: 26

## 2021-07-19 PROCEDURE — 12013 RPR F/E/E/N/L/M 2.6-5.0 CM: CPT

## 2021-07-19 PROCEDURE — 99285 EMERGENCY DEPT VISIT HI MDM: CPT | Mod: 25

## 2021-07-19 PROCEDURE — 73562 X-RAY EXAM OF KNEE 3: CPT | Mod: 26,LT

## 2021-07-19 PROCEDURE — 70486 CT MAXILLOFACIAL W/O DYE: CPT | Mod: 26

## 2021-07-19 PROCEDURE — 70450 CT HEAD/BRAIN W/O DYE: CPT | Mod: 26

## 2021-07-19 RX ORDER — IBUPROFEN 200 MG
600 TABLET ORAL ONCE
Refills: 0 | Status: COMPLETED | OUTPATIENT
Start: 2021-07-19 | End: 2021-07-19

## 2021-07-19 RX ORDER — MIDAZOLAM HYDROCHLORIDE 1 MG/ML
4 INJECTION, SOLUTION INTRAMUSCULAR; INTRAVENOUS ONCE
Refills: 0 | Status: DISCONTINUED | OUTPATIENT
Start: 2021-07-19 | End: 2021-07-19

## 2021-07-19 RX ORDER — ONDANSETRON 8 MG/1
4 TABLET, FILM COATED ORAL ONCE
Refills: 0 | Status: COMPLETED | OUTPATIENT
Start: 2021-07-19 | End: 2021-07-19

## 2021-07-19 RX ADMIN — Medication 600 MILLIGRAM(S): at 10:49

## 2021-07-19 RX ADMIN — ONDANSETRON 4 MILLIGRAM(S): 8 TABLET, FILM COATED ORAL at 13:26

## 2021-07-19 NOTE — ED ADULT NURSE NOTE - OBJECTIVE STATEMENT
pt presents to room 9, arrives in a wheelchair, hx of MR and autism, here for evaluation chin laceration and right side leg pain after ,mechanical fall. staff with pt denies any anticoagulant use. pt medicated as per order, 2 staff members from sending facility with pt at bedside. Will continue to monitor.

## 2021-07-19 NOTE — ED ADULT TRIAGE NOTE - CHIEF COMPLAINT QUOTE
pt with hx of MR, autism, s/p fall today. c/o laceration to chin and left knee pain. denies head injury.

## 2021-07-19 NOTE — ED PROVIDER NOTE - PATIENT PORTAL LINK FT
You can access the FollowMyHealth Patient Portal offered by [facility] by registering at the following website: [email or web address]. By joining [employer]’s FollowMyHealth portal, you will also be able to view your health information using other applications (apps) compatible with our system.

## 2021-07-19 NOTE — ED PROVIDER NOTE - PHYSICAL EXAMINATION
VITALS: Initial triage and subsequent vitals have been reviewed by me.  Gen: Well appearing, NAD, alert, non-toxic  Head: 2cm subq linear lac across chin  HEENT: PERRL, MMM, normal conjunctiva, anicteric, neck supple  Lung: CTAB, no adventitious sounds  CV: RRR, no murmurs, 2+symmetric peripheral pulses  Abd: soft, NTND, no rebound or guarding, no palpable masses  MSK: No edema, no visible deformities, No bony ttp, knee ROM intact no laxity  Neuro: Following commands appropriately, moving all extremities grossly w/o obvious limitation  Skin: see head exam for lac details VITALS: Initial triage and subsequent vitals have been reviewed by me.  Gen: Well appearing, NAD, alert, non-toxic  Head: 2cm subq linear lac across chin  HEENT: PERRL, MMM, normal conjunctiva, anicteric, neck supple  Lung: CTAB, no adventitious sounds  CV: RRR, no murmurs, 2+symmetric peripheral pulses  Abd: soft, NTND, no rebound or guarding, no palpable masses  MSK: No edema, no visible deformities, No bony ttp, knee ROM intact w/o difficulty, no patellar ttp, no laxity  Neuro: Following commands appropriately, moving all extremities grossly w/o obvious limitation  Skin: see head exam for lac details

## 2021-07-19 NOTE — ED PROVIDER NOTE - CLINICAL SUMMARY MEDICAL DECISION MAKING FREE TEXT BOX
s/p low mechanism mechanical trip and fall w/ knee pain and chin lac. No significant head trauma. Other than lac, benign physical exam w/ no clear limitations on limited neuro exam or other signs of trauma. Given low mechanism fall from standing w/ no red flag features on hx or physical exam, will defer CTH as low suspicion ICH at this time and reassess. Affected leg neurovasc intact w/ no clear signs of trauma or deformity, will get XR of extremity to r/o fx. s/p low mechanism mechanical trip and fall w/ knee pain and chin lac. No significant head trauma. Other than lac, benign physical exam w/ no clear limitations on limited neuro exam or other signs of trauma. Given low mechanism fall from standing w/ no red flag features on hx or physical exam, will defer CTH as low suspicion ICH at this time and reassess. Affected leg neurovasc intact w/ no clear signs of trauma or deformity and normal exam, will get XR of extremity to r/o fx.

## 2021-07-19 NOTE — ED PROVIDER NOTE - NSFOLLOWUPINSTRUCTIONS_ED_ALL_ED_FT
Rest, drink plenty of fluids  Advance activity as tolerated  Continue all previously prescribed medications as directed  Follow up with your PMD - bring copies of your results  Return to the ER for chest pain, difficulty breathing, altered mental status, or other new or concerning symptoms   - Some swelling, redness, and pain are common with all wounds and normally will go away as the wound heals. If swelling, redness, or pain increases or if the wound feels warm to the touch from the wound, contact your PMD or us. The sutures should be removed in 5-7 days. For any other concerns, such as re-opening the wound, fever, or pus from the wound, please contact us immediately.  - Leave original bandages on the wound for the first 24 hours. After this time, showering or rinsing is recommended.   - After the first day, remove old bandages and gently cleanse the wound with soap and water. Pat dry, do not rub the site. Cleansing twice a day prevents buildup of debris and will result in easier suture removal.

## 2021-07-19 NOTE — ED PROCEDURE NOTE - ATTENDING CONTRIBUTION TO CARE
I have personally discussed the indications, risks and benefits of the above procedure with the resident and/or ACP. I was present for key portions of the procedure and assisted when required.

## 2021-07-19 NOTE — ED PROVIDER NOTE - OBJECTIVE STATEMENT
30yo M hx MR/scott s/p witnessed trip and fall where he was chasing another person and tripped and fell onto L knee and chin 2.5 hrs PTA. No LOC. No change in mental status, vomiting, only complaining of L knee pain. Unable to bear weight on affected side 2/2 knee pain. Limited hx but pt states no headache, cp, sob, hip pain, ankle pain and only pain in knee. TDAP UTD. 30yo M hx MR/scott s/p witnessed trip and fall where he was chasing another person and tripped and fell onto L knee and chin 2.5 hrs PTA. No LOC. No change in mental status, vomiting, only complaining of L knee pain pointing to patella. Unable to bear weight on affected side 2/2 knee pain. Limited hx but pt states no headache, cp, sob, hip pain, ankle pain and only pain in knee. TDAP UTD.

## 2021-07-19 NOTE — ED PROVIDER NOTE - PROGRESS NOTE DETAILS
Moncho Brar DO - pt now vomiting, will get CTH to r/o ICH, CT tech contacted to expedite CT [employer] PGY3:  CTs and Xrays unremarkable.  Vomiting resolved.  Laceration repaired.  Staff states patient at baseline, will dc home.

## 2021-07-26 ENCOUNTER — TRANSCRIPTION ENCOUNTER (OUTPATIENT)
Age: 31
End: 2021-07-26

## 2021-11-24 ENCOUNTER — INPATIENT (INPATIENT)
Facility: HOSPITAL | Age: 31
LOS: 1 days | Discharge: HOME | End: 2021-11-26
Attending: INTERNAL MEDICINE | Admitting: INTERNAL MEDICINE
Payer: MEDICAID

## 2021-11-24 VITALS
SYSTOLIC BLOOD PRESSURE: 153 MMHG | RESPIRATION RATE: 16 BRPM | DIASTOLIC BLOOD PRESSURE: 90 MMHG | HEIGHT: 66 IN | OXYGEN SATURATION: 100 % | HEART RATE: 74 BPM

## 2021-11-24 DIAGNOSIS — R32 UNSPECIFIED URINARY INCONTINENCE: ICD-10-CM

## 2021-11-24 DIAGNOSIS — F72 SEVERE INTELLECTUAL DISABILITIES: ICD-10-CM

## 2021-11-24 DIAGNOSIS — K59.00 CONSTIPATION, UNSPECIFIED: ICD-10-CM

## 2021-11-24 DIAGNOSIS — Z98.811 DENTAL RESTORATION STATUS: Chronic | ICD-10-CM

## 2021-11-24 DIAGNOSIS — E83.42 HYPOMAGNESEMIA: ICD-10-CM

## 2021-11-24 DIAGNOSIS — E03.9 HYPOTHYROIDISM, UNSPECIFIED: ICD-10-CM

## 2021-11-24 DIAGNOSIS — E78.5 HYPERLIPIDEMIA, UNSPECIFIED: ICD-10-CM

## 2021-11-24 DIAGNOSIS — K52.9 NONINFECTIVE GASTROENTERITIS AND COLITIS, UNSPECIFIED: ICD-10-CM

## 2021-11-24 DIAGNOSIS — Z29.9 ENCOUNTER FOR PROPHYLACTIC MEASURES, UNSPECIFIED: ICD-10-CM

## 2021-11-24 DIAGNOSIS — K21.9 GASTRO-ESOPHAGEAL REFLUX DISEASE WITHOUT ESOPHAGITIS: ICD-10-CM

## 2021-11-24 DIAGNOSIS — E87.1 HYPO-OSMOLALITY AND HYPONATREMIA: ICD-10-CM

## 2021-11-24 DIAGNOSIS — R56.9 UNSPECIFIED CONVULSIONS: ICD-10-CM

## 2021-11-24 LAB
ALBUMIN SERPL ELPH-MCNC: 4.3 G/DL — SIGNIFICANT CHANGE UP (ref 3.3–5)
ALP SERPL-CCNC: 75 U/L — SIGNIFICANT CHANGE UP (ref 40–120)
ALT FLD-CCNC: 13 U/L — SIGNIFICANT CHANGE UP (ref 4–41)
ANION GAP SERPL CALC-SCNC: 13 MMOL/L — SIGNIFICANT CHANGE UP (ref 7–14)
AST SERPL-CCNC: 15 U/L — SIGNIFICANT CHANGE UP (ref 4–40)
B PERT DNA SPEC QL NAA+PROBE: SIGNIFICANT CHANGE UP
B PERT+PARAPERT DNA PNL SPEC NAA+PROBE: SIGNIFICANT CHANGE UP
BASE EXCESS BLDV CALC-SCNC: -0.6 MMOL/L — SIGNIFICANT CHANGE UP (ref -2–3)
BILIRUB SERPL-MCNC: <0.2 MG/DL — SIGNIFICANT CHANGE UP (ref 0.2–1.2)
BORDETELLA PARAPERTUSSIS (RAPRVP): SIGNIFICANT CHANGE UP
BUN SERPL-MCNC: 13 MG/DL — SIGNIFICANT CHANGE UP (ref 7–23)
C PNEUM DNA SPEC QL NAA+PROBE: SIGNIFICANT CHANGE UP
CALCIUM SERPL-MCNC: 9 MG/DL — SIGNIFICANT CHANGE UP (ref 8.4–10.5)
CHLORIDE SERPL-SCNC: 87 MMOL/L — LOW (ref 98–107)
CO2 BLDV-SCNC: 26.8 MMOL/L — HIGH (ref 22–26)
CO2 SERPL-SCNC: 22 MMOL/L — SIGNIFICANT CHANGE UP (ref 22–31)
CREAT SERPL-MCNC: 0.58 MG/DL — SIGNIFICANT CHANGE UP (ref 0.5–1.3)
FLUAV SUBTYP SPEC NAA+PROBE: SIGNIFICANT CHANGE UP
FLUBV RNA SPEC QL NAA+PROBE: SIGNIFICANT CHANGE UP
GLUCOSE SERPL-MCNC: 196 MG/DL — HIGH (ref 70–99)
HADV DNA SPEC QL NAA+PROBE: SIGNIFICANT CHANGE UP
HCO3 BLDV-SCNC: 25 MMOL/L — SIGNIFICANT CHANGE UP (ref 22–29)
HCOV 229E RNA SPEC QL NAA+PROBE: SIGNIFICANT CHANGE UP
HCOV HKU1 RNA SPEC QL NAA+PROBE: SIGNIFICANT CHANGE UP
HCOV NL63 RNA SPEC QL NAA+PROBE: SIGNIFICANT CHANGE UP
HCOV OC43 RNA SPEC QL NAA+PROBE: SIGNIFICANT CHANGE UP
HCT VFR BLD CALC: 39.1 % — SIGNIFICANT CHANGE UP (ref 39–50)
HGB BLD-MCNC: 13.7 G/DL — SIGNIFICANT CHANGE UP (ref 13–17)
HMPV RNA SPEC QL NAA+PROBE: SIGNIFICANT CHANGE UP
HPIV1 RNA SPEC QL NAA+PROBE: SIGNIFICANT CHANGE UP
HPIV2 RNA SPEC QL NAA+PROBE: SIGNIFICANT CHANGE UP
HPIV3 RNA SPEC QL NAA+PROBE: SIGNIFICANT CHANGE UP
HPIV4 RNA SPEC QL NAA+PROBE: SIGNIFICANT CHANGE UP
M PNEUMO DNA SPEC QL NAA+PROBE: SIGNIFICANT CHANGE UP
MAGNESIUM SERPL-MCNC: 1.5 MG/DL — LOW (ref 1.6–2.6)
MCHC RBC-ENTMCNC: 31.4 PG — SIGNIFICANT CHANGE UP (ref 27–34)
MCHC RBC-ENTMCNC: 35 GM/DL — SIGNIFICANT CHANGE UP (ref 32–36)
MCV RBC AUTO: 89.7 FL — SIGNIFICANT CHANGE UP (ref 80–100)
NRBC # BLD: 0 /100 WBCS — SIGNIFICANT CHANGE UP
NRBC # FLD: 0 K/UL — SIGNIFICANT CHANGE UP
PCO2 BLDV: 46 MMHG — SIGNIFICANT CHANGE UP (ref 42–55)
PH BLDV: 7.35 — SIGNIFICANT CHANGE UP (ref 7.32–7.43)
PHOSPHATE SERPL-MCNC: 3.5 MG/DL — SIGNIFICANT CHANGE UP (ref 2.5–4.5)
PLATELET # BLD AUTO: 165 K/UL — SIGNIFICANT CHANGE UP (ref 150–400)
PO2 BLDV: 25 MMHG — SIGNIFICANT CHANGE UP
POTASSIUM SERPL-MCNC: 4.5 MMOL/L — SIGNIFICANT CHANGE UP (ref 3.5–5.3)
POTASSIUM SERPL-SCNC: 4.5 MMOL/L — SIGNIFICANT CHANGE UP (ref 3.5–5.3)
PROT SERPL-MCNC: 7 G/DL — SIGNIFICANT CHANGE UP (ref 6–8.3)
RAPID RVP RESULT: SIGNIFICANT CHANGE UP
RBC # BLD: 4.36 M/UL — SIGNIFICANT CHANGE UP (ref 4.2–5.8)
RBC # FLD: 12.3 % — SIGNIFICANT CHANGE UP (ref 10.3–14.5)
RSV RNA SPEC QL NAA+PROBE: SIGNIFICANT CHANGE UP
RV+EV RNA SPEC QL NAA+PROBE: SIGNIFICANT CHANGE UP
SAO2 % BLDV: 41.1 % — SIGNIFICANT CHANGE UP
SARS-COV-2 RNA SPEC QL NAA+PROBE: SIGNIFICANT CHANGE UP
SODIUM SERPL-SCNC: 122 MMOL/L — LOW (ref 135–145)
TSH SERPL-MCNC: 2.15 UIU/ML — SIGNIFICANT CHANGE UP (ref 0.27–4.2)
WBC # BLD: 8.47 K/UL — SIGNIFICANT CHANGE UP (ref 3.8–10.5)
WBC # FLD AUTO: 8.47 K/UL — SIGNIFICANT CHANGE UP (ref 3.8–10.5)

## 2021-11-24 PROCEDURE — 99285 EMERGENCY DEPT VISIT HI MDM: CPT

## 2021-11-24 PROCEDURE — 99223 1ST HOSP IP/OBS HIGH 75: CPT

## 2021-11-24 RX ORDER — BENZOYL PEROXIDE 50 MG/ML
1 GEL TOPICAL AT BEDTIME
Refills: 0 | Status: DISCONTINUED | OUTPATIENT
Start: 2021-11-24 | End: 2021-11-26

## 2021-11-24 RX ORDER — OXCARBAZEPINE 300 MG/1
600 TABLET, FILM COATED ORAL
Refills: 0 | Status: DISCONTINUED | OUTPATIENT
Start: 2021-11-24 | End: 2021-11-24

## 2021-11-24 RX ORDER — DIVALPROEX SODIUM 500 MG/1
500 TABLET, DELAYED RELEASE ORAL
Refills: 0 | Status: DISCONTINUED | OUTPATIENT
Start: 2021-11-24 | End: 2021-11-25

## 2021-11-24 RX ORDER — BENZOYL PEROXIDE 50 MG/ML
1 GEL TOPICAL
Qty: 0 | Refills: 0 | DISCHARGE

## 2021-11-24 RX ORDER — PANTOPRAZOLE SODIUM 20 MG/1
40 TABLET, DELAYED RELEASE ORAL DAILY
Refills: 0 | Status: DISCONTINUED | OUTPATIENT
Start: 2021-11-24 | End: 2021-11-26

## 2021-11-24 RX ORDER — BENZOYL PEROXIDE 50 MG/ML
1 GEL TOPICAL AT BEDTIME
Refills: 0 | Status: DISCONTINUED | OUTPATIENT
Start: 2021-11-24 | End: 2021-11-24

## 2021-11-24 RX ORDER — SENNA PLUS 8.6 MG/1
2 TABLET ORAL AT BEDTIME
Refills: 0 | Status: DISCONTINUED | OUTPATIENT
Start: 2021-11-24 | End: 2021-11-24

## 2021-11-24 RX ORDER — SODIUM CHLORIDE 0.65 %
1 AEROSOL, SPRAY (ML) NASAL
Refills: 0 | Status: DISCONTINUED | OUTPATIENT
Start: 2021-11-24 | End: 2021-11-26

## 2021-11-24 RX ORDER — ONDANSETRON 8 MG/1
4 TABLET, FILM COATED ORAL ONCE
Refills: 0 | Status: COMPLETED | OUTPATIENT
Start: 2021-11-24 | End: 2021-11-24

## 2021-11-24 RX ORDER — DIAZEPAM 5 MG
1 TABLET ORAL
Qty: 0 | Refills: 0 | DISCHARGE

## 2021-11-24 RX ORDER — OXCARBAZEPINE 300 MG/1
600 TABLET, FILM COATED ORAL
Refills: 0 | Status: DISCONTINUED | OUTPATIENT
Start: 2021-11-24 | End: 2021-11-26

## 2021-11-24 RX ORDER — DESMOPRESSIN ACETATE 0.1 MG/1
1 TABLET ORAL
Qty: 0 | Refills: 0 | DISCHARGE

## 2021-11-24 RX ORDER — LEVOTHYROXINE SODIUM 125 MCG
50 TABLET ORAL DAILY
Refills: 0 | Status: DISCONTINUED | OUTPATIENT
Start: 2021-11-24 | End: 2021-11-25

## 2021-11-24 RX ORDER — SODIUM CHLORIDE 9 MG/ML
1000 INJECTION INTRAMUSCULAR; INTRAVENOUS; SUBCUTANEOUS
Refills: 0 | Status: DISCONTINUED | OUTPATIENT
Start: 2021-11-24 | End: 2021-11-24

## 2021-11-24 RX ORDER — PSYLLIUM SEED (WITH DEXTROSE)
2 POWDER (GRAM) ORAL
Qty: 0 | Refills: 0 | DISCHARGE

## 2021-11-24 RX ORDER — LORATADINE 10 MG/1
10 TABLET ORAL DAILY
Refills: 0 | Status: DISCONTINUED | OUTPATIENT
Start: 2021-11-24 | End: 2021-11-25

## 2021-11-24 RX ORDER — SODIUM CHLORIDE 9 MG/ML
1000 INJECTION INTRAMUSCULAR; INTRAVENOUS; SUBCUTANEOUS ONCE
Refills: 0 | Status: COMPLETED | OUTPATIENT
Start: 2021-11-24 | End: 2021-11-24

## 2021-11-24 RX ORDER — MAGNESIUM SULFATE 500 MG/ML
1 VIAL (ML) INJECTION ONCE
Refills: 0 | Status: COMPLETED | OUTPATIENT
Start: 2021-11-24 | End: 2021-11-24

## 2021-11-24 RX ORDER — CLINDAMYCIN PHOSPHATE GEL USP, 1% 10 MG/G
1 GEL TOPICAL
Qty: 0 | Refills: 0 | DISCHARGE

## 2021-11-24 RX ORDER — ACETAMINOPHEN 500 MG
2 TABLET ORAL
Qty: 0 | Refills: 0 | DISCHARGE

## 2021-11-24 RX ORDER — ENOXAPARIN SODIUM 100 MG/ML
40 INJECTION SUBCUTANEOUS DAILY
Refills: 0 | Status: DISCONTINUED | OUTPATIENT
Start: 2021-11-24 | End: 2021-11-26

## 2021-11-24 RX ORDER — OXYBUTYNIN CHLORIDE 5 MG
5 TABLET ORAL THREE TIMES A DAY
Refills: 0 | Status: DISCONTINUED | OUTPATIENT
Start: 2021-11-24 | End: 2021-11-26

## 2021-11-24 RX ORDER — MULTIVIT WITH MIN/MFOLATE/K2 340-15/3 G
150 POWDER (GRAM) ORAL
Qty: 0 | Refills: 0 | DISCHARGE

## 2021-11-24 RX ORDER — LACTULOSE 10 G/15ML
1 SOLUTION ORAL
Qty: 0 | Refills: 0 | DISCHARGE

## 2021-11-24 RX ORDER — OXCARBAZEPINE 300 MG/1
1200 TABLET, FILM COATED ORAL
Refills: 0 | Status: DISCONTINUED | OUTPATIENT
Start: 2021-11-24 | End: 2021-11-26

## 2021-11-24 RX ORDER — ONDANSETRON 8 MG/1
4 TABLET, FILM COATED ORAL EVERY 8 HOURS
Refills: 0 | Status: DISCONTINUED | OUTPATIENT
Start: 2021-11-24 | End: 2021-11-26

## 2021-11-24 RX ORDER — FLUTICASONE PROPIONATE 50 MCG
1 SPRAY, SUSPENSION NASAL
Refills: 0 | Status: DISCONTINUED | OUTPATIENT
Start: 2021-11-24 | End: 2021-11-26

## 2021-11-24 RX ORDER — THIOTHIXENE 5 MG
10 CAPSULE ORAL
Refills: 0 | Status: DISCONTINUED | OUTPATIENT
Start: 2021-11-24 | End: 2021-11-26

## 2021-11-24 RX ADMIN — ONDANSETRON 4 MILLIGRAM(S): 8 TABLET, FILM COATED ORAL at 17:44

## 2021-11-24 RX ADMIN — ONDANSETRON 4 MILLIGRAM(S): 8 TABLET, FILM COATED ORAL at 23:33

## 2021-11-24 RX ADMIN — Medication 100 GRAM(S): at 23:33

## 2021-11-24 RX ADMIN — SODIUM CHLORIDE 1000 MILLILITER(S): 9 INJECTION INTRAMUSCULAR; INTRAVENOUS; SUBCUTANEOUS at 17:44

## 2021-11-24 NOTE — H&P ADULT - PROBLEM SELECTOR PLAN 8
Last known BM 11/23.  Colace non formulary.  Hold Senna for now.  Monitor BMs. F/U Lipid panel.  Currently not on statin or any lipid lowering agent.

## 2021-11-24 NOTE — H&P ADULT - HISTORY OF PRESENT ILLNESS
History and current medication provided by the pt's aid at the group home and accompanied him to the ED, due to the pt unable to provide the symptoms that cause him to come to the ED.     31M from [facility] group [place], history of sever MR, lead poisoning, Autism, Hypothyroidism, Constipation, enuresis, acne, Hyperlipidemia, GERD, seizure disorder, experienced 3 episodes of diarrhea on 11/22 with the last diarrhea episode on 11/23. The pt began vomiting on 11/24 and had about 10 episodes, unable to tolerate po, so he was sent to the ED. The aid says the pt can become violent, gabbing and scratching, although he has been lass active. When asking the pt if he has complaints, he only answers "want to go home." The aid says she never noticed the pt have a recent falls, grabbing his head, chest, stomach. At the group home, the aid says the pat tolerates a regular diet.     In the ED, Na/ Cl= 122/ 87. He was given NS 1L bolus IV. Zofran 4 mg IV x 1. He passed the bedside dysphagia screen.     Vitals: T Max: 97.9 [id number], HR= 65b/ min, BP = 122/ 71, RR= 17b/ min, SPO2= 100% ra   History and current medication provided by the pt's aid at the group home and accompanied him to the ED, due to the pt unable to provide the symptoms that cause him to come to the ED.     31M from [facility] [place], history of sever MR, lead poisoning, Autism, Hypothyroidism, Constipation, enuresis, acne, Hyperlipidemia, GERD, seizure disorder, experienced 3 episodes of diarrhea on 11/22 with the last diarrhea episode on 11/23. The pt began vomiting on 11/24 and had about 10 episodes, unable to tolerate po, so he was sent to the ED. The aid says the pt can become violent, gabbing and scratching, although he has been lass active. When asking the pt if he has complaints, he only answers "want to go home." The aid says she never noticed the pt have a recent falls, grabbing his head, chest, stomach. At the group home, the aid says the pat tolerates a regular diet.     In the ED, Na/ Cl= 122/ 87. He was given NS 1L bolus IV. Zofran 4 mg IV x 1. He passed the bedside dysphagia screen.     Vitals: T Max: 97.9 rectal, HR= 65b/ min, BP = 122/ 71, RR= 17b/ min, SPO2= 100% ra   History and current medication provided by the pt's aid at the group home and accompanied him to the ED, due to the pt unable to provide the symptoms 2/2 cognitive d/o.     30yo Male from [facility] group home, history of severe MR, lead poisoning, autism, hypothyroidism, constipation, enuresis, acne, hyperlipidemia, GERD, seizure disorder; Pt experienced 3 episodes of diarrhea on 11/22 with the last diarrhea episode 1 day ago in " a car." The pt also has been observed to have persisting episodes of non-bloody vomiting since 11/24; Appx has had about 10 episodes, unable to tolerate oral intake, therefore was sent to the ED. The aide states that at baseline the pt can become violent with gestures of gabbing and scratching, however has been less active for the past 2-3 days. When asked if has complaints, only answers "want to go home." The aide says that it was not noticed that the pt would have recent falls, guarding Abd, Chest or head. At the group home, per the aide, the pat tolerates a regular diet at baseline.    ED course: Na/ Cl= 122/ 87. given NS 1L bolus IV. Zofran 4 mg IV x 1. He passed the bedside dysphagia screen.   5S course: active episode of vomiting per RN.    Vitals: T Max: 97.9 rectal, HR= 65b/ min, BP = 122/ 71, RR= 17b/ min, SPO2= 100% ra

## 2021-11-24 NOTE — ED PROVIDER NOTE - OBJECTIVE STATEMENT
31 year old male with PMH hypothyroidism, seizure disorder, intellectual disability presents with nausea and multiple episodes of nonbloody emesis starting today. Patient accompanied by group home aide who endorses the history. States that he is additionally less active than usual. Denies fevers at home, chills, shortness of breath. No recent medication changes or additions.

## 2021-11-24 NOTE — H&P ADULT - PROBLEM SELECTOR PLAN 10
VTE with Lovenox 40 mg sub cut daily.  fall, Aspiration, safety, seizure precautions. Pt aid the pt can become violent with grabbing and scratching. The aid says there will always be someone from their facility, the pt is familiar with to help calm the patient.   Says the pt is no longer on Diazepam.  Pt currently calm at this time. Pt aide the pt can become violent with grabbing and scratching.   The aid says there will always be someone from the facility who is familiar with the patient.   -Will require 1:1 monitoring for safety if the facility no longer able to provide a sitter   -Says the pt is no longer on Diazepam.  -Currently calm at this time.

## 2021-11-24 NOTE — H&P ADULT - GASTROINTESTINAL DETAILS
soft/nontender/no distention/no masses palpable/bowel sounds normal/no bruit/no rebound tenderness/no guarding/no rigidity soft/no distention/no masses palpable/bowel sounds normal/no rebound tenderness/no guarding/no rigidity

## 2021-11-24 NOTE — H&P ADULT - ATTENDING COMMENTS
30yo Male from [facility] group home, history of severe MR, lead poisoning, autism, hypothyroidism, constipation, enuresis, acne, hyperlipidemia, GERD, seizure disorder a/w intractable nausea with vomiting due to possible gastroenteritis vs gastric obstruction; Found to be also severely hyponatremic; 32yo Male from [facility] group home, history of severe MR, lead poisoning, autism, hypothyroidism, constipation, enuresis, acne, hyperlipidemia, GERD, seizure disorder a/w intractable nausea with vomiting due to possible gastroenteritis vs gastric obstruction; Found to be also severely hyponatremic;    The above assessment and plan were supplemented and amended by attending where indicated;

## 2021-11-24 NOTE — H&P ADULT - PROBLEM SELECTOR PLAN 9
Pt aid the pt can become violent with grabbing and scratching. The aid says there will always be someone from their facility, the pt is familiar with to help calm the patient.   Says the pt is no longer on Diazepam.  Pt currently calm at this time. Last known BM 11/23.  Colace non formulary.  Hold Senna for now.  Monitor BMs.

## 2021-11-24 NOTE — H&P ADULT - PROBLEM SELECTOR PLAN 2
Zofran 4 mg IV Q8* PRN nausea, vomit.  Protonix 40 mg IV daily.   Passed bedside dysphagia screen.  Aspiration precautions.   Diet; Clears and advance as tolerated.  Last episode of diarrhea 11/23.  Holding Colace for now. Zofran 4 mg IV Q8* PRN nausea, vomit.  Protonix 40 mg IV daily.   Passed bedside dysphagia screen.  Aspiration precautions.   Diet; Clears and advance as tolerated.  Last episode of diarrhea 11/23.  Holding Colace for now.  F/U VBG Given intractable vomiting concerned for gastric obstruction and/or enteritis;   Ordered CT A/P w/ contrast   Ordered Ativan IV 2mg for sedation prior to CT A/P (Given severe intellectual d/o will require sedation to perform CT A/P)  NPO   Zofran 4 mg IV Q8* PRN nausea, vomit.  Protonix 40 mg IV daily.   Hold all nonessential medications   Passed bedside dysphagia screen.  Aspiration precautions.   Last episode of diarrhea 11/24  Holding Colace    F/U VBG  Consider GI c/s pending CT A/P results Acute; New intractable vomiting concerned for gastric obstruction and/or enteritis;   Ordered CT A/P w/ IV contrast   Ordered Ativan IV 2mg for sedation prior to CT A/P (Given severe intellectual d/o will require sedation to perform CT A/P)  NPO   Zofran 4 mg IV Q8* PRN nausea, vomit.  Protonix 40 mg IV daily.   Hold all nonessential medications   Passed bedside dysphagia screen.  Aspiration precautions.   Last episode of diarrhea 11/24  Holding Colace    F/U VBG  Consider GI c/s pending CT A/P results

## 2021-11-24 NOTE — ED PROVIDER NOTE - CLINICAL SUMMARY MEDICAL DECISION MAKING FREE TEXT BOX
Joanne Park DO PGY-1  31 year old male with PMH hypothyroidism, seizure disorder, intellectual disability presents with nausea and multiple episodes of nonbloody emesis starting today. Patient accompanied by group home aide who endorses the history. States that he is additionally less active than usual. Denies fevers at home, chills, shortness of breath. No recent medication changes or additions. Abdomen soft, nontender, no rashes or lesions. Evaluating for hematologic and electrolyte abnormalities, viral illness. Will give IV fluids, treat nausea, RVP, and re-assess.

## 2021-11-24 NOTE — H&P ADULT - NSHPPHYSICALEXAM_GEN_ALL_CORE
Vital Signs Last 24 Hrs  T(C): 36.6 (24 Nov 2021 23:24), Max: 36.6 (24 Nov 2021 17:31)  T(F): 97.8 (24 Nov 2021 23:24), Max: 97.9 (24 Nov 2021 17:31)  HR: 70 (24 Nov 2021 23:24) (65 - 74)  BP: 137/78 (24 Nov 2021 23:24) (122/71 - 153/90)  BP(mean): --  RR: 18 (24 Nov 2021 23:24) (16 - 18)  SpO2: 97% (24 Nov 2021 23:24) (97% - 100%)

## 2021-11-24 NOTE — H&P ADULT - ASSESSMENT
31M , history of sever MR, lead poisoning, Autism, Hypothyroidism, Constipation, enuresis, acne, Hyperlipidemia, GERD, seizure disorder, admitted for hyponatremia likely due to gastroenteritis.  32yo Male from [facility] group home, history of severe MR, lead poisoning, autism, hypothyroidism, constipation, enuresis, acne, hyperlipidemia, GERD, seizure disorder a/w intractable nausea with vomiting due to possible gastroenteritis vs gastric obstruction; Found to be also severely hyponatremic;

## 2021-11-24 NOTE — ED PROVIDER NOTE - PHYSICAL EXAMINATION
PHYSICAL EXAM:  CONSTITUTIONAL: Awake, alert.  HEAD: Atraumatic  EYES: Clear bilaterally, pupils equal, round and reactive to light.  ENMT: Airway patent, Nasal mucosa clear. Mouth with dry mucosa. Uvula is midline.   CARDIAC: Normal rate, regular rhythm.  +S1/S2.  No murmurs, rubs or gallops.  RESPIRATORY: Breathing unlabored. Breath sounds clear and equal bilaterally.  ABDOMEN:  Soft, nontender, nondistended. No rebound tenderness or guarding.  NEUROLOGICAL: Alert and oriented.  SKIN: Skin warm and dry. No evidence of rashes or lesions.

## 2021-11-24 NOTE — H&P ADULT - PROBLEM SELECTOR PLAN 4
Continue protonic 40 mg IV daily.  Currently not on H2B or PPI at facility. Last Seizure unknown.  Seizure precautions.  Continue Keppra and Trileptal.  F/U Valproic acid level.

## 2021-11-24 NOTE — H&P ADULT - NSHPLABSRESULTS_GEN_ALL_CORE
13.7   8.47  )-----------( 165      ( 24 Nov 2021 17:47 )             39.1     11-24    122<L>  |  87<L>  |  13  ----------------------------<  196<H>  4.5   |  22  |  0.58    Ca    9.0      24 Nov 2021 17:47    TPro  7.0  /  Alb  4.3  /  TBili  <0.2  /  DBili  x   /  AST  15  /  ALT  13  /  AlkPhos  75  11-24      RVP: Not Detected.   COVID PCR : Not detected. Personally reviewed the labs:    13.7   8.47  )-----------( 165      ( 24 Nov 2021 17:47 )             39.1     11-24    122<L>  |  87<L>  |  13  ----------------------------<  196<H>  4.5   |  22  |  0.58    Ca    9.0      24 Nov 2021 17:47    TPro  7.0  /  Alb  4.3  /  TBili  <0.2  /  DBili  x   /  AST  15  /  ALT  13  /  AlkPhos  75  11-24      RVP: Not Detected.   COVID PCR : Not detected.

## 2021-11-24 NOTE — H&P ADULT - PROBLEM SELECTOR PLAN 5
F/U TSH.  Continue Synthroid. Continue protonic 40 mg IV daily.  Currently not on H2B or PPI at facility.

## 2021-11-24 NOTE — H&P ADULT - NSHPOUTPATIENTPROVIDERS_GEN_ALL_CORE
PCP Dr Gastelum, [place]   Supervisor @ [facility]: Mrs Vanita Marrufo

## 2021-11-24 NOTE — H&P ADULT - NSHPSOCIALHISTORY_GEN_ALL_CORE
Group home resident.  No history of ETOH, Illicit/ recreation drug use, Nicotine. Group home resident.  Not employed  Requires assistance with ADL  No history of ETOH, Illicit/ recreation drug use, Nicotine.

## 2021-11-24 NOTE — H&P ADULT - PROBLEM SELECTOR PLAN 3
Last Seizure unknown.  Seizure precautions.  Continue Keppra and Trileptal.  F/U Valproic acid level. Mg= 1.5  Supplementing with Mg Sulfate 1g IV X 1  f/u Mg in AM. Due to poor oral intake 2/2 intractable n/v  Mg= 1.5  Supplementing with Mg Sulfate 1g IV X 1  f/u Mg in AM.

## 2021-11-24 NOTE — ED PROVIDER NOTE - ATTENDING CONTRIBUTION TO CARE
32 yo M with PMHx hypothyroidism, seizure disorder, intellectual disability presents with nausea and NBNB emesis starting today. Patient accompanied by group home aide who endorses the history.     Evaluating for hematologic and electrolyte abnormalities, viral illness.   Will give IV fluids, treat nausea, RVP, and re-assess.    I performed a history and physical exam of the patient and discussed their management with the resident. I reviewed the resident's note and agree with the documented findings and plan of care. My medical decision making and observations are found above.

## 2021-11-24 NOTE — H&P ADULT - PROBLEM SELECTOR PLAN 1
na/ Cl= 122/ 87.  Received NS 1L IV bolus x 1.  F/U Urine, serum osmolalities and urine sodium.  Give gentle IVF with NS @ 75ml/ hour.  F/U BMP. Na/ Cl= 122/ 87.  Received NS 1L IV bolus x 1.  F/U Urine, serum osmolalities and urine sodium.  F/U BMP.  Consider gentle IVF if needed. Severe, Na/ Cl= 122/ 87. Likely due to SIADH  and poor oral intake;   Received NS 1L IV bolus x 1.  F/U Urine, serum osmolalities and urine sodium.  Monitor serum sodium q8h   c/w Gentle IVF  check TSH

## 2021-11-24 NOTE — ED ADULT TRIAGE NOTE - CHIEF COMPLAINT QUOTE
Pt aaox1 w/ hx of seizure disorder MR arrives from group home with aide from [facility] c/o diarrhea x 3 days and 2 episodes of vomiting today.  Unble to obtain temperature in triage.

## 2021-11-24 NOTE — ED PROVIDER NOTE - EMPLOYMENT
Pt c/o nausea and abd pain that began today.  Pt denies emesis or diarrhea.  Pt states nausea is improved after receiving medication in ED lobby.  Pt states he plays basketball often.  Pt denies cramping any where else in body.   
N/A

## 2021-11-25 ENCOUNTER — TRANSCRIPTION ENCOUNTER (OUTPATIENT)
Age: 31
End: 2021-11-25

## 2021-11-25 DIAGNOSIS — R56.9 UNSPECIFIED CONVULSIONS: ICD-10-CM

## 2021-11-25 LAB
A1C WITH ESTIMATED AVERAGE GLUCOSE RESULT: 5.3 % — SIGNIFICANT CHANGE UP (ref 4–5.6)
ANION GAP SERPL CALC-SCNC: 11 MMOL/L — SIGNIFICANT CHANGE UP (ref 7–14)
ANION GAP SERPL CALC-SCNC: 14 MMOL/L — SIGNIFICANT CHANGE UP (ref 7–14)
BUN SERPL-MCNC: 8 MG/DL — SIGNIFICANT CHANGE UP (ref 7–23)
BUN SERPL-MCNC: 9 MG/DL — SIGNIFICANT CHANGE UP (ref 7–23)
CALCIUM SERPL-MCNC: 8.5 MG/DL — SIGNIFICANT CHANGE UP (ref 8.4–10.5)
CALCIUM SERPL-MCNC: 9.2 MG/DL — SIGNIFICANT CHANGE UP (ref 8.4–10.5)
CHLORIDE SERPL-SCNC: 87 MMOL/L — LOW (ref 98–107)
CHLORIDE SERPL-SCNC: 93 MMOL/L — LOW (ref 98–107)
CHOLEST SERPL-MCNC: 113 MG/DL — SIGNIFICANT CHANGE UP
CO2 SERPL-SCNC: 22 MMOL/L — SIGNIFICANT CHANGE UP (ref 22–31)
CO2 SERPL-SCNC: 24 MMOL/L — SIGNIFICANT CHANGE UP (ref 22–31)
COVID-19 NUCLEOCAPSID GAM AB INTERP: POSITIVE
COVID-19 NUCLEOCAPSID TOTAL GAM ANTIBODY RESULT: 255 INDEX — HIGH
COVID-19 SPIKE DOMAIN AB INTERP: POSITIVE
COVID-19 SPIKE DOMAIN ANTIBODY RESULT: >250 U/ML — HIGH
CREAT SERPL-MCNC: 0.42 MG/DL — LOW (ref 0.5–1.3)
CREAT SERPL-MCNC: 0.67 MG/DL — SIGNIFICANT CHANGE UP (ref 0.5–1.3)
ESTIMATED AVERAGE GLUCOSE: 105 — SIGNIFICANT CHANGE UP
GLUCOSE SERPL-MCNC: 106 MG/DL — HIGH (ref 70–99)
GLUCOSE SERPL-MCNC: 72 MG/DL — SIGNIFICANT CHANGE UP (ref 70–99)
HCT VFR BLD CALC: 40.2 % — SIGNIFICANT CHANGE UP (ref 39–50)
HDLC SERPL-MCNC: 31 MG/DL — LOW
HGB BLD-MCNC: 14 G/DL — SIGNIFICANT CHANGE UP (ref 13–17)
LIPID PNL WITH DIRECT LDL SERPL: 75 MG/DL — SIGNIFICANT CHANGE UP
MAGNESIUM SERPL-MCNC: 2.2 MG/DL — SIGNIFICANT CHANGE UP (ref 1.6–2.6)
MCHC RBC-ENTMCNC: 31 PG — SIGNIFICANT CHANGE UP (ref 27–34)
MCHC RBC-ENTMCNC: 34.8 GM/DL — SIGNIFICANT CHANGE UP (ref 32–36)
MCV RBC AUTO: 89.1 FL — SIGNIFICANT CHANGE UP (ref 80–100)
NON HDL CHOLESTEROL: 82 MG/DL — SIGNIFICANT CHANGE UP
NRBC # BLD: 0 /100 WBCS — SIGNIFICANT CHANGE UP
NRBC # FLD: 0 K/UL — SIGNIFICANT CHANGE UP
OSMOLALITY SERPL: 263 MOSM/KG — LOW (ref 275–295)
PHOSPHATE SERPL-MCNC: 4.6 MG/DL — HIGH (ref 2.5–4.5)
PLATELET # BLD AUTO: 204 K/UL — SIGNIFICANT CHANGE UP (ref 150–400)
POTASSIUM SERPL-MCNC: 4.5 MMOL/L — SIGNIFICANT CHANGE UP (ref 3.5–5.3)
POTASSIUM SERPL-MCNC: 4.8 MMOL/L — SIGNIFICANT CHANGE UP (ref 3.5–5.3)
POTASSIUM SERPL-SCNC: 4.5 MMOL/L — SIGNIFICANT CHANGE UP (ref 3.5–5.3)
POTASSIUM SERPL-SCNC: 4.8 MMOL/L — SIGNIFICANT CHANGE UP (ref 3.5–5.3)
RBC # BLD: 4.51 M/UL — SIGNIFICANT CHANGE UP (ref 4.2–5.8)
RBC # FLD: 12.4 % — SIGNIFICANT CHANGE UP (ref 10.3–14.5)
SARS-COV-2 IGG+IGM SERPL QL IA: 255 INDEX — HIGH
SARS-COV-2 IGG+IGM SERPL QL IA: >250 U/ML — HIGH
SARS-COV-2 IGG+IGM SERPL QL IA: POSITIVE
SARS-COV-2 IGG+IGM SERPL QL IA: POSITIVE
SODIUM SERPL-SCNC: 123 MMOL/L — LOW (ref 135–145)
SODIUM SERPL-SCNC: 128 MMOL/L — LOW (ref 135–145)
TRIGL SERPL-MCNC: 34 MG/DL — SIGNIFICANT CHANGE UP
TSH SERPL-MCNC: 1.4 UIU/ML — SIGNIFICANT CHANGE UP (ref 0.27–4.2)
VALPROATE SERPL-MCNC: 36.1 UG/ML — LOW (ref 50–100)
WBC # BLD: 6.88 K/UL — SIGNIFICANT CHANGE UP (ref 3.8–10.5)
WBC # FLD AUTO: 6.88 K/UL — SIGNIFICANT CHANGE UP (ref 3.8–10.5)

## 2021-11-25 PROCEDURE — 99233 SBSQ HOSP IP/OBS HIGH 50: CPT

## 2021-11-25 PROCEDURE — 74177 CT ABD & PELVIS W/CONTRAST: CPT | Mod: 26

## 2021-11-25 RX ORDER — VALPROIC ACID (AS SODIUM SALT) 250 MG/5ML
250 SOLUTION, ORAL ORAL ONCE
Refills: 0 | Status: COMPLETED | OUTPATIENT
Start: 2021-11-25 | End: 2021-11-25

## 2021-11-25 RX ORDER — SODIUM CHLORIDE 9 MG/ML
1000 INJECTION INTRAMUSCULAR; INTRAVENOUS; SUBCUTANEOUS
Refills: 0 | Status: DISCONTINUED | OUTPATIENT
Start: 2021-11-25 | End: 2021-11-25

## 2021-11-25 RX ORDER — VALPROIC ACID (AS SODIUM SALT) 250 MG/5ML
250 SOLUTION, ORAL ORAL EVERY 6 HOURS
Refills: 0 | Status: DISCONTINUED | OUTPATIENT
Start: 2021-11-25 | End: 2021-11-26

## 2021-11-25 RX ORDER — LEVOTHYROXINE SODIUM 125 MCG
40 TABLET ORAL AT BEDTIME
Refills: 0 | Status: DISCONTINUED | OUTPATIENT
Start: 2021-11-25 | End: 2021-11-26

## 2021-11-25 RX ORDER — VALPROIC ACID (AS SODIUM SALT) 250 MG/5ML
500 SOLUTION, ORAL ORAL ONCE
Refills: 0 | Status: DISCONTINUED | OUTPATIENT
Start: 2021-11-25 | End: 2021-11-25

## 2021-11-25 RX ADMIN — SODIUM CHLORIDE 70 MILLILITER(S): 9 INJECTION INTRAMUSCULAR; INTRAVENOUS; SUBCUTANEOUS at 01:35

## 2021-11-25 RX ADMIN — Medication 40 MICROGRAM(S): at 22:13

## 2021-11-25 RX ADMIN — OXCARBAZEPINE 1200 MILLIGRAM(S): 300 TABLET, FILM COATED ORAL at 22:22

## 2021-11-25 RX ADMIN — PANTOPRAZOLE SODIUM 40 MILLIGRAM(S): 20 TABLET, DELAYED RELEASE ORAL at 11:01

## 2021-11-25 RX ADMIN — Medication 1 SPRAY(S): at 18:01

## 2021-11-25 RX ADMIN — Medication 5 MILLIGRAM(S): at 13:30

## 2021-11-25 RX ADMIN — Medication 26.25 MILLIGRAM(S): at 18:01

## 2021-11-25 RX ADMIN — Medication 2 MILLIGRAM(S): at 19:48

## 2021-11-25 RX ADMIN — Medication 5 MILLIGRAM(S): at 22:21

## 2021-11-25 RX ADMIN — Medication 26.25 MILLIGRAM(S): at 13:51

## 2021-11-25 RX ADMIN — Medication 10 MILLIGRAM(S): at 22:21

## 2021-11-25 RX ADMIN — Medication 26.25 MILLIGRAM(S): at 07:01

## 2021-11-25 RX ADMIN — OXCARBAZEPINE 600 MILLIGRAM(S): 300 TABLET, FILM COATED ORAL at 10:56

## 2021-11-25 RX ADMIN — Medication 26.25 MILLIGRAM(S): at 01:34

## 2021-11-25 RX ADMIN — ENOXAPARIN SODIUM 40 MILLIGRAM(S): 100 INJECTION SUBCUTANEOUS at 11:00

## 2021-11-25 NOTE — DISCHARGE NOTE PROVIDER - NSDCFUADDINST_GEN_ALL_CORE_FT
Please return to your care home and continue your usual medications as previously prescribed. Your caretakers told us you did not need any prescription refills at this time.  Your caretakers also told us you eat best with pre-cut foods - please continue safely with a bite-sized cut-up diet. As your sodium was low this hospitalization, you do not need to follow a strict low sodium diet, but please continue to eat healthy.

## 2021-11-25 NOTE — DISCHARGE NOTE PROVIDER - NSDCMRMEDTOKEN_GEN_ALL_CORE_FT
benzoyl peroxide 10% topical gel: Apply topically to affected area once a day (at bedtime)  Centrum Silver oral tablet: 1 tab(s) orally once a day  Claritin 10 mg oral tablet: 1 tab(s) orally once a day  Colace 100 mg oral capsule: 1 cap(s) orally 2 times a day  Depakote  mg oral tablet, extended release: 1 tab(s) orally 2 times a day  fluticasone 50 mcg/inh nasal spray: 1 spray(s) nasal once a day  lactase 3000 units oral tablet: 3 tab(s) orally once a day  levothyroxine 50 mcg (0.05 mg) oral tablet: 1 tab(s) orally once a day (in the morning)  OXcarbazepine 600 mg oral tablet: 1 tab(s) orally once a day (in the morning)  OXcarbazepine 600 mg oral tablet: 2 tab(s) orally once a day (at bedtime)  oxybutynin 5 mg oral tablet: 1 tab(s) orally 3 times a day  Saline Mist 0.65% nasal spray: 2 spray(s) nasal once a day  thiothixene 5 mg oral capsule: 2 cap(s) orally once a day (at bedtime)

## 2021-11-25 NOTE — DISCHARGE NOTE PROVIDER - NSDCCPCAREPLAN_GEN_ALL_CORE_FT
PRINCIPAL DISCHARGE DIAGNOSIS  Diagnosis: Gastroenteritis  Assessment and Plan of Treatment:       SECONDARY DISCHARGE DIAGNOSES  Diagnosis: Seizures  Assessment and Plan of Treatment: c/w trileptal and depakote.      Diagnosis: Hypothyroid  Assessment and Plan of Treatment: Hypothyroid, TSH wnl, Continue Synthroid.      Diagnosis: Enuresis  Assessment and Plan of Treatment: Enuresis Continue Detrol LA.      Diagnosis: GERD (gastroesophageal reflux disease)  Assessment and Plan of Treatment:   Continue protonix 40 mg  daily.     PRINCIPAL DISCHARGE DIAGNOSIS  Diagnosis: Gastroenteritis  Assessment and Plan of Treatment: You came to the hospital with nausea and vomiting, which we thought was likely due to viral gastroenteritis (inflammation of the stomach due to a virus). You also had Hyponatremia - or too low of blood sodium-  likely due to your dehydration from your nausea and vomiting. Your sodium we trended in bloodwork and it got back very close to normal. For your stomach symptoms, we imaged your abdomen and pelvis with a CT scan - which showed no evidence of bowel obstruction and no other reasons why you might have been vomiting; so that is why we feel your symptoms were due to a virus. You have clinically improved and are tolerating your regular bite-sized diet well - please continue to hydrate and nourish your body normally. Please call your doctor or 911 with any return of or worsening of your symptoms. Please follow up with your Primary Care Doctor in 1 week and you may also consider seeing a gastroenterologist (GI) doctor as an outpatient if your symptoms continue, and/or for your medical history of GERD.      SECONDARY DISCHARGE DIAGNOSES  Diagnosis: GERD (gastroesophageal reflux disease)  Assessment and Plan of Treatment: Our doctors here did not want to start a new outpatient medication for your GERD at this time in the hospital. Your Primary Care Doctor can manage your symptoms, or decide if you may need outpatient medical advice from a stomach (GI) doctor for your GERD.    Diagnosis: Seizures  Assessment and Plan of Treatment: Please continue your home seizure medications of trileptal and depakote, as prescribed by your Primary Care Doctor.    Diagnosis: Hypothyroid  Assessment and Plan of Treatment: Your TSH level was normal- please continue Synthroid as prescribed by your Primary Care Doctor.    Diagnosis: Enuresis  Assessment and Plan of Treatment: Please continue Detrol LA as prescribed by your Primary Care Doctor.

## 2021-11-25 NOTE — DISCHARGE NOTE PROVIDER - NSDCACTIVITY_GEN_ALL_CORE
Walking - Indoors allowed/Walking - Outdoors allowed Bathing allowed/Do not drive or operate machinery/Do not make important decisions/Walking - Indoors allowed/Walking - Outdoors allowed

## 2021-11-25 NOTE — DISCHARGE NOTE PROVIDER - NSDCFUADDAPPT_GEN_ALL_CORE_FT
Please follow up with your Primary Care Provider within 1 week. Please call you doctor, 911 or return to the hospital if your symptoms return or worsen.

## 2021-11-25 NOTE — DISCHARGE NOTE PROVIDER - HOSPITAL COURSE
Hyponatremia, TSH wnl  Gastroenteritis- CT A/P w/ IV contrast showed   Seizures- c/w trileptal and depakote.   GERD (gastroesophageal reflux disease)- Continue protonic 40 mg IV daily. Currently not on H2B or PPI at facility.   Hypothyroid, TSH wnl, Continue Synthroid.  Enuresis Continue Detrol LA.   Severe intellectual disability 1:1 monitoring for safety if the facility no longer able to provide a sitter      30yo Male from [facility] group home, history of severe MR, lead poisoning, autism, hypothyroidism, constipation, enuresis, acne, hyperlipidemia, GERD, seizure disorder a/w intractable nausea with vomiting due to possible gastroenteritis vs gastric obstruction; Found to be also severely hyponatremic.    Hyponatremia, likely d/t dehydration 2/2 N/V/D, normalizing. Sodium level of 122 on admission, now 133. S/p IVF. TSH wnl.     Gastroenteritis: Acute; New intractable vomiting likely viral gastroenteritis.   - CT A/P w/ IV contrast wnl. no evidence of bowel obstruction   - Symptoms now resolved, has not needed Zofran IV PRN for nausea/vomiting since 11/24.  - s/p NPO and diet advancement- tolerating regular bite-sized diet well      Protonix 40 mg  daily      Case discussed with Dr. Johnston on 11/26/2021, pt medically stabilized and cleared for discharge back to group home.              32yo Male from [facility] group home, history of severe MR, lead poisoning, autism, hypothyroidism, constipation, enuresis, acne, hyperlipidemia, GERD, seizure disorder a/w intractable nausea with vomiting due to possible gastroenteritis vs gastric obstruction; Found to be also severely hyponatremic.    Hyponatremia, likely d/t dehydration 2/2 N/V/D, normalizing. Sodium level of 122 on admission, now 133. S/p IVF. TSH wnl.     Gastroenteritis: Acute; New intractable vomiting likely viral gastroenteritis.   - CT A/P w/ IV contrast wnl. no evidence of bowel obstruction   - Symptoms now resolved, has not needed Zofran IV PRN for nausea/vomiting since 11/24.  - s/p NPO and diet advancement- tolerating regular bite-sized diet well  - Per Dr. Johnston, no need to continue Protonix as outpatient at this time- can follow up as outpatient with GI as needed.        Case discussed with Dr. Johnston on 11/26/2021, pt medically stabilized and cleared for discharge back to group home.

## 2021-11-26 ENCOUNTER — TRANSCRIPTION ENCOUNTER (OUTPATIENT)
Age: 31
End: 2021-11-26

## 2021-11-26 VITALS
SYSTOLIC BLOOD PRESSURE: 110 MMHG | OXYGEN SATURATION: 98 % | TEMPERATURE: 98 F | DIASTOLIC BLOOD PRESSURE: 65 MMHG | HEART RATE: 94 BPM | RESPIRATION RATE: 18 BRPM

## 2021-11-26 LAB
ANION GAP SERPL CALC-SCNC: 12 MMOL/L — SIGNIFICANT CHANGE UP (ref 7–14)
BUN SERPL-MCNC: 9 MG/DL — SIGNIFICANT CHANGE UP (ref 7–23)
CALCIUM SERPL-MCNC: 8.9 MG/DL — SIGNIFICANT CHANGE UP (ref 8.4–10.5)
CHLORIDE SERPL-SCNC: 100 MMOL/L — SIGNIFICANT CHANGE UP (ref 98–107)
CO2 SERPL-SCNC: 21 MMOL/L — LOW (ref 22–31)
CREAT SERPL-MCNC: 0.63 MG/DL — SIGNIFICANT CHANGE UP (ref 0.5–1.3)
GLUCOSE SERPL-MCNC: 97 MG/DL — SIGNIFICANT CHANGE UP (ref 70–99)
HCT VFR BLD CALC: 39.5 % — SIGNIFICANT CHANGE UP (ref 39–50)
HGB BLD-MCNC: 14.4 G/DL — SIGNIFICANT CHANGE UP (ref 13–17)
MAGNESIUM SERPL-MCNC: 1.9 MG/DL — SIGNIFICANT CHANGE UP (ref 1.6–2.6)
MCHC RBC-ENTMCNC: 31.7 PG — SIGNIFICANT CHANGE UP (ref 27–34)
MCHC RBC-ENTMCNC: 36.5 GM/DL — HIGH (ref 32–36)
MCV RBC AUTO: 87 FL — SIGNIFICANT CHANGE UP (ref 80–100)
NRBC # BLD: 0 /100 WBCS — SIGNIFICANT CHANGE UP
NRBC # FLD: 0 K/UL — SIGNIFICANT CHANGE UP
PHOSPHATE SERPL-MCNC: 2.8 MG/DL — SIGNIFICANT CHANGE UP (ref 2.5–4.5)
PLATELET # BLD AUTO: 197 K/UL — SIGNIFICANT CHANGE UP (ref 150–400)
POTASSIUM SERPL-MCNC: 4.5 MMOL/L — SIGNIFICANT CHANGE UP (ref 3.5–5.3)
POTASSIUM SERPL-SCNC: 4.5 MMOL/L — SIGNIFICANT CHANGE UP (ref 3.5–5.3)
RBC # BLD: 4.54 M/UL — SIGNIFICANT CHANGE UP (ref 4.2–5.8)
RBC # FLD: 12.8 % — SIGNIFICANT CHANGE UP (ref 10.3–14.5)
SODIUM SERPL-SCNC: 133 MMOL/L — LOW (ref 135–145)
WBC # BLD: 10.34 K/UL — SIGNIFICANT CHANGE UP (ref 3.8–10.5)
WBC # FLD AUTO: 10.34 K/UL — SIGNIFICANT CHANGE UP (ref 3.8–10.5)

## 2021-11-26 PROCEDURE — 99239 HOSP IP/OBS DSCHRG MGMT >30: CPT

## 2021-11-26 RX ORDER — LEVOTHYROXINE SODIUM 125 MCG
50 TABLET ORAL ONCE
Refills: 0 | Status: COMPLETED | OUTPATIENT
Start: 2021-11-26 | End: 2021-11-26

## 2021-11-26 RX ORDER — LEVOTHYROXINE SODIUM 125 MCG
40 TABLET ORAL ONCE
Refills: 0 | Status: DISCONTINUED | OUTPATIENT
Start: 2021-11-26 | End: 2021-11-26

## 2021-11-26 RX ORDER — DIVALPROEX SODIUM 500 MG/1
500 TABLET, DELAYED RELEASE ORAL
Refills: 0 | Status: DISCONTINUED | OUTPATIENT
Start: 2021-11-26 | End: 2021-11-26

## 2021-11-26 RX ADMIN — Medication 5 MILLIGRAM(S): at 14:33

## 2021-11-26 RX ADMIN — Medication 26.25 MILLIGRAM(S): at 06:47

## 2021-11-26 RX ADMIN — Medication 26.25 MILLIGRAM(S): at 00:50

## 2021-11-26 RX ADMIN — Medication 50 MICROGRAM(S): at 17:08

## 2021-11-26 RX ADMIN — OXCARBAZEPINE 600 MILLIGRAM(S): 300 TABLET, FILM COATED ORAL at 09:25

## 2021-11-26 NOTE — PROGRESS NOTE ADULT - ASSESSMENT
30yo Male from [facility] group home, history of severe MR, lead poisoning, autism, hypothyroidism, constipation, enuresis, acne, hyperlipidemia, GERD, seizure disorder a/w intractable nausea with vomiting due to possible gastroenteritis vs gastric obstruction; Found to be also severely hyponatremic; 
30yo Male from [facility] group home, history of severe MR, lead poisoning, autism, hypothyroidism, constipation, enuresis, acne, hyperlipidemia, GERD, seizure disorder a/w intractable nausea with vomiting due to possible gastroenteritis vs gastric obstruction; Found to be also severely hyponatremic;

## 2021-11-26 NOTE — PROGRESS NOTE ADULT - PROBLEM SELECTOR PLAN 4
Continue protonic 40 mg  daily.  Currently not on H2B or PPI at facility.
Continue protonic 40 mg IV daily.  Currently not on H2B or PPI at facility.

## 2021-11-26 NOTE — DISCHARGE NOTE NURSING/CASE MANAGEMENT/SOCIAL WORK - PATIENT PORTAL LINK FT
You can access the FollowMyHealth Patient Portal offered by [facility] by registering at the following website: [email or web address]. By joining [facility]’s FollowMyHealth portal, you will also be able to view your health information using other applications (apps) compatible with our system.

## 2021-11-26 NOTE — PROGRESS NOTE ADULT - PROBLEM SELECTOR PLAN 1
likely d/t dehydration 2/2 N/V/D, normalizing  -Na 132 today, off IVF  -TSH wnl
likely d/t dehydration 2/2 N/V/D, improving on IVF  -TSH wnl  -Na 122-128 over 12 hr on IVF. will stop ivf to prevent overall correction   -monitor BMP q12 today

## 2021-11-26 NOTE — PROGRESS NOTE ADULT - PROBLEM SELECTOR PLAN 8
VTE with Lovenox 40 mg sub cut daily.  fall, Aspiration, safety, seizure precautions.
VTE with Lovenox 40 mg sub cut daily.  fall, Aspiration, safety, seizure precautions.

## 2021-11-26 NOTE — PROGRESS NOTE ADULT - SUBJECTIVE AND OBJECTIVE BOX
[facility] Division of Hospital Medicine  David Johnston MD  Pager [phone]      Patient is a 31y old  Male who presents with a chief complaint of Diarrhea and vomiting x 3 days (25 Nov 2021 16:56)      SUBJECTIVE / OVERNIGHT EVENTS:    no acute event o/n. appears comfortable.  tolerating regular diet well.       MEDICATIONS  (STANDING):  benzoyl peroxide 5% Gel 1 Application(s) Topical at bedtime  enoxaparin Injectable 40 milliGRAM(s) SubCutaneous daily  fluticasone propionate 50 MICROgram(s)/spray Nasal Spray 1 Spray(s) Both Nostrils two times a day  levothyroxine Injectable 40 MICROGram(s) IV Push at bedtime  OXcarbazepine 600 milliGRAM(s) Oral <User Schedule>  OXcarbazepine 1200 milliGRAM(s) Oral <User Schedule>  oxybutynin 5 milliGRAM(s) Oral three times a day  pantoprazole  Injectable 40 milliGRAM(s) IV Push daily  thiothixene 10 milliGRAM(s) Oral <User Schedule>  valproate sodium IVPB 250 milliGRAM(s) IV Intermittent every 6 hours    MEDICATIONS  (PRN):  ondansetron Injectable 4 milliGRAM(s) IV Push every 8 hours PRN Nausea and/or Vomiting  sodium chloride 0.65% Nasal 1 Spray(s) Both Nostrils two times a day PRN Nasal Congestion      CAPILLARY BLOOD GLUCOSE        I&O's Summary      PHYSICAL EXAM:  Vital Signs Last 24 Hrs  T(C): 36.9 (26 Nov 2021 06:03), Max: 36.9 (26 Nov 2021 06:03)  T(F): 98.4 (26 Nov 2021 06:03), Max: 98.4 (26 Nov 2021 06:03)  HR: 94 (26 Nov 2021 06:03) (75 - 94)  BP: 110/65 (26 Nov 2021 06:03) (108/72 - 112/64)  BP(mean): --  RR: 18 (26 Nov 2021 06:03) (18 - 18)  SpO2: 98% (26 Nov 2021 06:03) (98% - 100%)    CONSTITUTIONAL: NAD,  EYES: PERRLA; conjunctiva and sclera clear  ENMT: Moist oral mucosa, no pharyngeal injection or exudates;   NECK: Supple, no palpable masses;  RESPIRATORY: Normal respiratory effort; lungs are clear to auscultation bilaterally  CARDIOVASCULAR: Regular rate and rhythm, normal S1 and S2, no murmur/rub/gallop; No lower extremity edema; Peripheral pulses are 2+ bilaterally  ABDOMEN: Nontender to palpation, normoactive bowel sounds, no rebound/guarding;   MUSCLOSKELETAL:   no clubbing or cyanosis of digits; no joint swelling or tenderness to palpation  PSYCH: A+O to person, place, and time; affect appropriate  NEUROLOGY: CN 2-12 are intact and symmetric; no gross sensory deficits;   SKIN: No rashes;     LABS:                        14.4   10.34 )-----------( 197      ( 26 Nov 2021 11:05 )             39.5     11-26    133<L>  |  100  |  9   ----------------------------<  97  4.5   |  21<L>  |  0.63    Ca    8.9      26 Nov 2021 11:05  Phos  2.8     11-26  Mg     1.90     11-26    TPro  7.0  /  Alb  4.3  /  TBili  <0.2  /  DBili  x   /  AST  15  /  ALT  13  /  AlkPhos  75  11-24                RADIOLOGY & ADDITIONAL TESTS:  Results Reviewed:   Imaging Personally Reviewed:  Electrocardiogram Personally Reviewed:    COORDINATION OF CARE:  Care Discussed with Consultants/Other Providers [Y/N]:  Prior or Outpatient Records Reviewed [Y/N]:  
[facility] Division of Hospital Medicine  David Johnston MD  Pager [phone]      Patient is a 31y old  Male who presents with a chief complaint of Diarrhea and vomiting x 3 days (24 Nov 2021 20:14)      SUBJECTIVE / OVERNIGHT EVENTS:    pt appears calm this am, repeatedly states " go home ". no new vomiting episode this am. wants to eat      MEDICATIONS  (STANDING):  benzoyl peroxide 5% Gel 1 Application(s) Topical at bedtime  enoxaparin Injectable 40 milliGRAM(s) SubCutaneous daily  fluticasone propionate 50 MICROgram(s)/spray Nasal Spray 1 Spray(s) Both Nostrils two times a day  levothyroxine Injectable 40 MICROGram(s) IV Push at bedtime  LORazepam   Injectable 2 milliGRAM(s) IV Push once  OXcarbazepine 600 milliGRAM(s) Oral <User Schedule>  OXcarbazepine 1200 milliGRAM(s) Oral <User Schedule>  oxybutynin 5 milliGRAM(s) Oral three times a day  pantoprazole  Injectable 40 milliGRAM(s) IV Push daily  sodium chloride 0.9%. 1000 milliLiter(s) (70 mL/Hr) IV Continuous <Continuous>  thiothixene 10 milliGRAM(s) Oral <User Schedule>  valproate sodium IVPB 250 milliGRAM(s) IV Intermittent every 6 hours    MEDICATIONS  (PRN):  ondansetron Injectable 4 milliGRAM(s) IV Push every 8 hours PRN Nausea and/or Vomiting  sodium chloride 0.65% Nasal 1 Spray(s) Both Nostrils two times a day PRN Nasal Congestion      CAPILLARY BLOOD GLUCOSE        I&O's Summary      PHYSICAL EXAM:  Vital Signs Last 24 Hrs  T(C): 36.7 (25 Nov 2021 12:00), Max: 36.7 (25 Nov 2021 06:20)  T(F): 98 (25 Nov 2021 12:00), Max: 98.1 (25 Nov 2021 06:20)  HR: 87 (25 Nov 2021 12:00) (65 - 87)  BP: 116/72 (25 Nov 2021 12:00) (100/76 - 153/90)  BP(mean): --  RR: 18 (25 Nov 2021 12:00) (16 - 18)  SpO2: 100% (25 Nov 2021 12:00) (97% - 100%)    CONSTITUTIONAL: NAD,  EYES: PERRLA; conjunctiva and sclera clear  ENMT: Moist oral mucosa, no pharyngeal injection or exudates;   NECK: Supple, no palpable masses;  RESPIRATORY: Normal respiratory effort; lungs are clear to auscultation bilaterally  CARDIOVASCULAR: Regular rate and rhythm, normal S1 and S2, no murmur/rub/gallop; No lower extremity edema; Peripheral pulses are 2+ bilaterally  ABDOMEN: Nontender to palpation, normoactive bowel sounds, no rebound/guarding;   MUSCLOSKELETAL:   no clubbing or cyanosis of digits; no joint swelling or tenderness to palpation  PSYCH: A+O to person  NEUROLOGY: moves all exts  SKIN: No rashes;     LABS:                        14.0   6.88  )-----------( 204      ( 25 Nov 2021 06:51 )             40.2     11-25    128<L>  |  93<L>  |  8   ----------------------------<  72  4.8   |  24  |  0.67    Ca    9.2      25 Nov 2021 06:51  Phos  4.6     11-25  Mg     2.20     11-25    TPro  7.0  /  Alb  4.3  /  TBili  <0.2  /  DBili  x   /  AST  15  /  ALT  13  /  AlkPhos  75  11-24                RADIOLOGY & ADDITIONAL TESTS:  Results Reviewed:   Imaging Personally Reviewed:  Electrocardiogram Personally Reviewed:    COORDINATION OF CARE:  Care Discussed with Consultants/Other Providers [Y/N]:  Prior or Outpatient Records Reviewed [Y/N]:

## 2021-11-26 NOTE — DISCHARGE NOTE NURSING/CASE MANAGEMENT/SOCIAL WORK - NSDCVIVACCINE_GEN_ALL_CORE_FT
Tdap; 10-Mar-2017 09:27; Rossy England (RN); [employer]; [id number]; IntraMuscular; Deltoid Right.; 0.5 milliLiter(s); VIS (VIS Published: 09-May-2013, VIS Presented: 10-Mar-2017);   Tdap; 17-May-2017 19:03; Daniella Rahman (RN); [employer]; [id number]; IntraMuscular; Deltoid Right.; 0.5 milliLiter(s); VIS (VIS Published: 09-May-2013, VIS Presented: 17-May-2017);   Tdap; 08-Aug-2018 15:01; Milton Muro (RN); [employer]; [id number]; IntraMuscular; Deltoid Right.; 0.5 milliLiter(s); VIS (VIS Published: 09-May-2013, VIS Presented: 08-Aug-2018);

## 2021-11-26 NOTE — PROGRESS NOTE ADULT - PROBLEM SELECTOR PLAN 2
Acute; New intractable vomiting likely viral gastroenteritis.   CT A/P w/ IV contrast wnl. no e/o obstruction   Zofran 4 mg IV Q8* PRN nausea, vomit.  Protonix 40 mg  daily  c/w regular diet
Acute; New intractable vomiting likely viral gastroenteritis. will r/o obstruction   CT A/P w/ IV contrast pending -will need pre-medication with ativan   Zofran 4 mg IV Q8* PRN nausea, vomit.  Protonix 40 mg IV daily  GI PCR if diarrhea recurs  advance diet to clears

## 2021-11-26 NOTE — PROGRESS NOTE ADULT - PROBLEM SELECTOR PLAN 7
Pt aide the pt can become violent with grabbing and scratching.   The aid says there will always be someone from the facility who is familiar with the patient.   -Will require 1:1 monitoring for safety if the facility no longer able to provide a sitter   -Currently calm at this time.
Pt aide the pt can become violent with grabbing and scratching.   The aid says there will always be someone from the facility who is familiar with the patient.   -Will require 1:1 monitoring for safety if the facility no longer able to provide a sitter   -Currently calm at this time.

## 2022-01-01 NOTE — ED ADULT NURSE NOTE - FINAL NURSING ELECTRONIC SIGNATURE
I will STOP taking the medications listed below when I get home from the hospital:  None
05-Aug-2018 21:44

## 2022-03-17 NOTE — ED ADULT TRIAGE NOTE - AS O2 DELIVERY
Met with patient, he signed all paperwork awaiting dr signature to fax completed application for revlimid. room air

## 2022-03-18 ENCOUNTER — OUTPATIENT (OUTPATIENT)
Dept: OUTPATIENT SERVICES | Facility: HOSPITAL | Age: 32
LOS: 1 days | End: 2022-03-18
Payer: MEDICAID

## 2022-03-18 VITALS
TEMPERATURE: 98 F | SYSTOLIC BLOOD PRESSURE: 139 MMHG | HEIGHT: 66 IN | RESPIRATION RATE: 16 BRPM | WEIGHT: 158.07 LBS | HEART RATE: 82 BPM | OXYGEN SATURATION: 98 % | DIASTOLIC BLOOD PRESSURE: 85 MMHG

## 2022-03-18 DIAGNOSIS — Z87.19 PERSONAL HISTORY OF OTHER DISEASES OF THE DIGESTIVE SYSTEM: ICD-10-CM

## 2022-03-18 DIAGNOSIS — Z98.811 DENTAL RESTORATION STATUS: Chronic | ICD-10-CM

## 2022-03-18 DIAGNOSIS — R13.10 DYSPHAGIA, UNSPECIFIED: ICD-10-CM

## 2022-03-18 DIAGNOSIS — K02.9 DENTAL CARIES, UNSPECIFIED: ICD-10-CM

## 2022-03-18 DIAGNOSIS — K02.62 DENTAL CARIES ON SMOOTH SURFACE PENETRATING INTO DENTIN: ICD-10-CM

## 2022-03-18 DIAGNOSIS — K05.6 PERIODONTAL DISEASE, UNSPECIFIED: ICD-10-CM

## 2022-03-18 LAB
ANION GAP SERPL CALC-SCNC: 13 MMOL/L — SIGNIFICANT CHANGE UP (ref 5–17)
BUN SERPL-MCNC: 10 MG/DL — SIGNIFICANT CHANGE UP (ref 7–23)
CALCIUM SERPL-MCNC: 9 MG/DL — SIGNIFICANT CHANGE UP (ref 8.4–10.5)
CHLORIDE SERPL-SCNC: 90 MMOL/L — LOW (ref 96–108)
CO2 SERPL-SCNC: 21 MMOL/L — LOW (ref 22–31)
CREAT SERPL-MCNC: 0.53 MG/DL — SIGNIFICANT CHANGE UP (ref 0.5–1.3)
EGFR: 137 ML/MIN/1.73M2 — SIGNIFICANT CHANGE UP
GLUCOSE SERPL-MCNC: 105 MG/DL — HIGH (ref 70–99)
HCT VFR BLD CALC: 39.5 % — SIGNIFICANT CHANGE UP (ref 39–50)
HGB BLD-MCNC: 13.9 G/DL — SIGNIFICANT CHANGE UP (ref 13–17)
MCHC RBC-ENTMCNC: 30.9 PG — SIGNIFICANT CHANGE UP (ref 27–34)
MCHC RBC-ENTMCNC: 35.2 GM/DL — SIGNIFICANT CHANGE UP (ref 32–36)
MCV RBC AUTO: 87.8 FL — SIGNIFICANT CHANGE UP (ref 80–100)
NRBC # BLD: 0 /100 WBCS — SIGNIFICANT CHANGE UP (ref 0–0)
PLATELET # BLD AUTO: 250 K/UL — SIGNIFICANT CHANGE UP (ref 150–400)
POTASSIUM SERPL-MCNC: 4.4 MMOL/L — SIGNIFICANT CHANGE UP (ref 3.5–5.3)
POTASSIUM SERPL-SCNC: 4.4 MMOL/L — SIGNIFICANT CHANGE UP (ref 3.5–5.3)
RBC # BLD: 4.5 M/UL — SIGNIFICANT CHANGE UP (ref 4.2–5.8)
RBC # FLD: 12.3 % — SIGNIFICANT CHANGE UP (ref 10.3–14.5)
SODIUM SERPL-SCNC: 124 MMOL/L — LOW (ref 135–145)
WBC # BLD: 7.65 K/UL — SIGNIFICANT CHANGE UP (ref 3.8–10.5)
WBC # FLD AUTO: 7.65 K/UL — SIGNIFICANT CHANGE UP (ref 3.8–10.5)

## 2022-03-18 PROCEDURE — G0463: CPT

## 2022-03-18 PROCEDURE — 71045 X-RAY EXAM CHEST 1 VIEW: CPT

## 2022-03-18 PROCEDURE — 85027 COMPLETE CBC AUTOMATED: CPT

## 2022-03-18 PROCEDURE — 71045 X-RAY EXAM CHEST 1 VIEW: CPT | Mod: 26

## 2022-03-18 PROCEDURE — 80048 BASIC METABOLIC PNL TOTAL CA: CPT

## 2022-03-18 RX ORDER — OXCARBAZEPINE 300 MG/1
2 TABLET, FILM COATED ORAL
Qty: 0 | Refills: 0 | DISCHARGE

## 2022-03-18 RX ORDER — FLUTICASONE PROPIONATE 50 MCG
1 SPRAY, SUSPENSION NASAL
Qty: 0 | Refills: 0 | DISCHARGE

## 2022-03-18 NOTE — H&P PST ADULT - HISTORY OF PRESENT ILLNESS
30 y/o male with PMH: Seizure Disorder: medically mangaed. No seizure activity in years,  Cerebral Palsy, Autism, Mental Retardation: simple verbalizations, co-operative with care, Ambulatory, steady Gait, self mutilating behavior: bangs head: wears helmet as safety precaution. Current dx: Dental Caries. Scheduled: Comprehensive Dental Treatment on 10/7/19. 33 y/o male with PMH of Seizure Disorder -  medically managed, no seizure activity in years,  Cerebral Palsy, Autism, Mental Retardation (simple verbalizations, co-operative with care, ambulatory, steady gait, self mutilating behavior - bangs head, wears helmet as safety precaution), deental caries scheduled for Comprehensive Dental Treatment on 04/08/22.  31 y/o male with PMH of Seizure Disorder -  medically managed, no seizure activity in years,  Cerebral Palsy, Autism, Mental Retardation (simple verbalizations, co-operative with care, ambulatory, steady gait, self mutilating behavior - bangs head, wears helmet as safety precaution), dental caries scheduled for Comprehensive Dental Treatment on 04/08/22.     03/21/22. Addendum: Sodium 124. Surgeon/PCP notified.  33 y/o male with PMH of Seizure Disorder -  medically managed, no seizure activity in years,  Cerebral Palsy, Autism, Mental Retardation (simple verbalizations, co-operative with care, ambulatory, steady gait, self mutilating behavior - bangs head, wears helmet as safety precaution), dental caries scheduled for Comprehensive Dental Treatment on 04/08/22.     03/21/22. Addendum: Sodium 124. Surgeon/PCP notified.   4/6/2022 PCP eval on chart ( 3/10/2022 ) , repeat labs ( 3/29/2022)  Sodium 131 Marcello Roberts NP

## 2022-03-18 NOTE — H&P PST ADULT - NSICDXPASTMEDICALHX_GEN_ALL_CORE_FT
PAST MEDICAL HISTORY:  Autism     Constipation     Enuresis     GERD (gastroesophageal reflux disease)     Hyperlipemia     Hypothyroid     Lead poisoning     Low HDL (under 40)     Lyme disease     Mental retardation severe    Pervasive developmental disorder, active with self injurous behavior    Seizure disorder PAST MEDICAL HISTORY:  Autism     Constipation     Dysphagia     Enuresis     GERD (gastroesophageal reflux disease)     Hyperlipemia     Hypothyroid     Lead poisoning     Low HDL (under 40)     Lyme disease     Mental retardation severe    Pervasive developmental disorder, active with self injurous behavior    Pneumonia due to COVID-19 virus Right lung, 03/2020, hospitalized for 2 weeks, no intubation    Seizure disorder - on meds, no seizures for several years     PAST MEDICAL HISTORY:  Autism     Constipation     Dysphagia     Enuresis     GERD (gastroesophageal reflux disease)     Hyperlipemia     Hyponatremia     Hypothyroid     Lead poisoning     Low HDL (under 40)     Lyme disease     Mental retardation severe    Pervasive developmental disorder, active with self injurous behavior    Pneumonia due to COVID-19 virus Right lung, 03/2020, hospitalized for 2 weeks, no intubation    Seizure disorder - on meds, no seizures for several years

## 2022-03-18 NOTE — H&P PST ADULT - FALL HARM RISK - HARM RISK INTERVENTIONS
Assistance with ambulation/Assistance OOB with selected safe patient handling equipment/Communicate Risk of Fall with Harm to all staff/Discuss with provider need for PT consult/Monitor for mental status changes/Monitor gait and stability/Move patient closer to nurses' station/Reinforce activity limits and safety measures with patient and family/Reorient to person, place and time as needed/Tailored Fall Risk Interventions/Toileting schedule using arm’s reach rule for commode and bathroom/Use of alarms - bed, chair and/or voice tab/Visual Cue: Yellow wristband and red socks/Bed in lowest position, wheels locked, appropriate side rails in place/Call bell, personal items and telephone in reach/Instruct patient to call for assistance before getting out of bed or chair/Non-slip footwear when patient is out of bed/[place] to call system/Physically safe environment - no spills, clutter or unnecessary equipment/Purposeful Proactive Rounding/Room/bathroom lighting operational, light cord in reach

## 2022-03-18 NOTE — H&P PST ADULT - NSICDXPASTSURGICALHX_GEN_ALL_CORE_FT
PAST SURGICAL HISTORY:  S/P dental restoration PAST SURGICAL HISTORY:  S/P dental restoration 07/11/2018, 10/07/2019, 11/08/2019

## 2022-03-18 NOTE — H&P PST ADULT - MUSCULOSKELETAL
negative No joint pain, swelling or deformity; no limitation of movement details… detailed exam decreased ROM

## 2022-04-07 ENCOUNTER — TRANSCRIPTION ENCOUNTER (OUTPATIENT)
Age: 32
End: 2022-04-07

## 2022-04-07 NOTE — ASU DISCHARGE PLAN (ADULT/PEDIATRIC) - NS MD DC FALL RISK RISK
For information on Fall & Injury Prevention, visit: [email or web address] OR  [email or web address] OR  https://www.cdc.gov/steadi/patient.html

## 2022-04-07 NOTE — ASU DISCHARGE PLAN (ADULT/PEDIATRIC) - ASU DC SPECIAL INSTRUCTIONSFT
comprehensive dental treatment under general anesthesia     Tylenol prn pain    resume all medications     return to program on Sunday     see Dr Day at the [facility] in one to two weeks comprehensive dental treatment under general anesthesia- exam, xrays, cleaning, flouride and restorative tx     Tylenol prn pain    resume all medications     return to program on Sunday     see Dr Day at the [facility] in one to two weeks

## 2022-04-08 ENCOUNTER — OUTPATIENT (OUTPATIENT)
Dept: OUTPATIENT SERVICES | Facility: HOSPITAL | Age: 32
LOS: 1 days | End: 2022-04-08
Payer: MEDICAID

## 2022-04-08 VITALS
DIASTOLIC BLOOD PRESSURE: 70 MMHG | RESPIRATION RATE: 16 BRPM | OXYGEN SATURATION: 99 % | SYSTOLIC BLOOD PRESSURE: 121 MMHG | HEART RATE: 79 BPM

## 2022-04-08 VITALS
HEART RATE: 70 BPM | TEMPERATURE: 97 F | WEIGHT: 158.07 LBS | RESPIRATION RATE: 16 BRPM | HEIGHT: 66 IN | SYSTOLIC BLOOD PRESSURE: 137 MMHG | DIASTOLIC BLOOD PRESSURE: 90 MMHG

## 2022-04-08 DIAGNOSIS — K02.62 DENTAL CARIES ON SMOOTH SURFACE PENETRATING INTO DENTIN: ICD-10-CM

## 2022-04-08 DIAGNOSIS — K05.6 PERIODONTAL DISEASE, UNSPECIFIED: ICD-10-CM

## 2022-04-08 DIAGNOSIS — Z98.811 DENTAL RESTORATION STATUS: Chronic | ICD-10-CM

## 2022-04-08 PROCEDURE — D2391: CPT

## 2022-04-08 PROCEDURE — D1110: CPT

## 2022-04-08 PROCEDURE — D2394: CPT

## 2022-04-08 PROCEDURE — C9399: CPT

## 2022-04-08 RX ORDER — B-COMPLEX WITH VITAMIN C
3 CAPSULE ORAL
Qty: 0 | Refills: 0 | DISCHARGE

## 2022-04-08 RX ORDER — DIVALPROEX SODIUM 500 MG/1
1 TABLET, DELAYED RELEASE ORAL
Qty: 0 | Refills: 0 | DISCHARGE

## 2022-04-08 RX ORDER — OXYBUTYNIN CHLORIDE 5 MG
1 TABLET ORAL
Qty: 0 | Refills: 0 | DISCHARGE

## 2022-04-08 RX ORDER — BENZOYL PEROXIDE 50 MG/ML
1 GEL TOPICAL
Qty: 0 | Refills: 0 | DISCHARGE

## 2022-04-08 RX ORDER — OXCARBAZEPINE 300 MG/1
2 TABLET, FILM COATED ORAL
Qty: 0 | Refills: 0 | DISCHARGE

## 2022-04-08 RX ORDER — LEVOTHYROXINE SODIUM 125 MCG
1 TABLET ORAL
Qty: 0 | Refills: 0 | DISCHARGE

## 2022-04-08 RX ORDER — SODIUM CHLORIDE 0.65 %
2 AEROSOL, SPRAY (ML) NASAL
Qty: 0 | Refills: 0 | DISCHARGE

## 2022-04-08 RX ORDER — THIOTHIXENE 5 MG
2 CAPSULE ORAL
Qty: 0 | Refills: 0 | DISCHARGE

## 2022-04-08 RX ORDER — MULTIVIT-MIN/FERROUS GLUCONATE 9 MG/15 ML
1 LIQUID (ML) ORAL
Qty: 0 | Refills: 0 | DISCHARGE

## 2022-04-08 RX ORDER — LORATADINE 10 MG/1
1 TABLET ORAL
Qty: 0 | Refills: 0 | DISCHARGE

## 2022-04-08 RX ORDER — OXCARBAZEPINE 300 MG/1
1 TABLET, FILM COATED ORAL
Qty: 0 | Refills: 0 | DISCHARGE

## 2022-04-08 NOTE — ASU PATIENT PROFILE, ADULT - FALL HARM RISK - HARM RISK INTERVENTIONS
Assistance with ambulation/Assistance OOB with selected safe patient handling equipment/Communicate Risk of Fall with Harm to all staff/Discuss with provider need for PT consult/Monitor for mental status changes/Monitor gait and stability/Move patient closer to nurses' station/Reinforce activity limits and safety measures with patient and family/Reorient to person, place and time as needed/Tailored Fall Risk Interventions/Toileting schedule using arm’s reach rule for commode and bathroom/Use of alarms - bed, chair and/or voice tab/Visual Cue: Yellow wristband and red socks/Bed in lowest position, wheels locked, appropriate side rails in place/Call bell, personal items and telephone in reach/Instruct patient to call for assistance before getting out of bed or chair/Non-slip footwear when patient is out of bed/[place] to call system/Physically safe environment - no spills, clutter or unnecessary equipment/Purposeful Proactive Rounding/Room/bathroom lighting operational, light cord in reach

## 2022-04-08 NOTE — H&P PST ADULT - CENTRAL VENOUS CATHETER
--Tylenol 272 mg (8.5 ml) every 6 hours as needed for fever. --Ibuprofen 180 mg ((9.0 ml) every 6 hours as needed for fever. --Ondansetron 2 mg every 6 hours as needed for nausea. --Follow up with [facility] Monday if she is still having fevers. Return to the ED if she has trouble breathing, if the fever does not come down with the Tylenol and Motrin, or if she has vomiting that keeps her from keeping the fluids/medicine down.
no

## 2022-04-08 NOTE — ASU PATIENT PROFILE, ADULT - NSICDXPASTMEDICALHX_GEN_ALL_CORE_FT
PAST MEDICAL HISTORY:  Autism     Constipation     Dysphagia     Enuresis     GERD (gastroesophageal reflux disease)     Hyperlipemia     Hyponatremia     Hypothyroid     Lead poisoning     Low HDL (under 40)     Lyme disease     Mental retardation severe    Pervasive developmental disorder, active with self injurous behavior    Pneumonia due to COVID-19 virus Right lung, 03/2020, hospitalized for 2 weeks, no intubation    Seizure disorder - on meds, no seizures for several years

## 2022-05-25 ENCOUNTER — NON-APPOINTMENT (OUTPATIENT)
Age: 32
End: 2022-05-25

## 2022-07-10 ENCOUNTER — EMERGENCY (EMERGENCY)
Facility: HOSPITAL | Age: 32
LOS: 1 days | Discharge: HOME | End: 2022-07-10
Admitting: EMERGENCY MEDICINE

## 2022-07-10 VITALS
OXYGEN SATURATION: 100 % | DIASTOLIC BLOOD PRESSURE: 71 MMHG | HEART RATE: 60 BPM | RESPIRATION RATE: 18 BRPM | TEMPERATURE: 97 F | SYSTOLIC BLOOD PRESSURE: 142 MMHG | HEIGHT: 66 IN

## 2022-07-10 DIAGNOSIS — Z98.811 DENTAL RESTORATION STATUS: Chronic | ICD-10-CM

## 2022-07-10 PROCEDURE — 99283 EMERGENCY DEPT VISIT LOW MDM: CPT
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
## 2022-07-10 NOTE — ED PROVIDER NOTE - PATIENT PORTAL LINK FT
You can access the FollowMyHealth Patient Portal offered by [facility] by registering at the following website: [email or web address]. By joining [employer]’s FollowMyHealth portal, you will also be able to view your health information using other applications (apps) compatible with our system.

## 2022-07-10 NOTE — ED PROVIDER NOTE - NSFOLLOWUPINSTRUCTIONS_ED_ALL_ED_FT
Rest, drink plenty of fluids.  Advance activity as tolerated.  Continue all previously prescribed medications as directed.  Follow up with your primary care physician in 48-72 hours- bring copies of your results.  Return to the ER for worsening or persistent symptoms, and/or ANY NEW OR CONCERNING SYMPTOMS. If you have issues obtaining follow up, please call: [phone] to obtain a doctor or specialist who takes your insurance in your area.
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
## 2022-07-11 PROBLEM — R13.10 DYSPHAGIA, UNSPECIFIED: Chronic | Status: ACTIVE | Noted: 2022-03-18

## 2022-07-11 PROBLEM — E87.1 HYPO-OSMOLALITY AND HYPONATREMIA: Chronic | Status: ACTIVE | Noted: 2022-03-21

## 2022-07-11 PROBLEM — U07.1 COVID-19: Chronic | Status: ACTIVE | Noted: 2022-03-18

## 2022-07-11 PROBLEM — G40.909 EPILEPSY, UNSPECIFIED, NOT INTRACTABLE, WITHOUT STATUS EPILEPTICUS: Chronic | Status: ACTIVE | Noted: 2018-06-20

## 2022-07-11 PROBLEM — K21.9 GASTRO-ESOPHAGEAL REFLUX DISEASE WITHOUT ESOPHAGITIS: Chronic | Status: ACTIVE | Noted: 2022-03-18

## 2022-07-27 ENCOUNTER — EMERGENCY (EMERGENCY)
Facility: HOSPITAL | Age: 32
LOS: 1 days | Discharge: HOME | End: 2022-07-27
Attending: STUDENT IN AN ORGANIZED HEALTH CARE EDUCATION/TRAINING PROGRAM | Admitting: STUDENT IN AN ORGANIZED HEALTH CARE EDUCATION/TRAINING PROGRAM

## 2022-07-27 VITALS
HEART RATE: 85 BPM | HEIGHT: 66 IN | RESPIRATION RATE: 18 BRPM | OXYGEN SATURATION: 98 % | TEMPERATURE: 98 F | DIASTOLIC BLOOD PRESSURE: 85 MMHG | SYSTOLIC BLOOD PRESSURE: 131 MMHG

## 2022-07-27 DIAGNOSIS — Z98.811 DENTAL RESTORATION STATUS: Chronic | ICD-10-CM

## 2022-07-27 PROCEDURE — 99284 EMERGENCY DEPT VISIT MOD MDM: CPT

## 2022-07-27 PROCEDURE — 70450 CT HEAD/BRAIN W/O DYE: CPT | Mod: 26,MF

## 2022-07-27 PROCEDURE — G1004: CPT
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
## 2022-07-27 NOTE — ED PROVIDER NOTE - PHYSICAL EXAMINATION
Constitutional: Awake, Alert, non-toxic. NAD. Well appearing, well nourished.   HEAD: Normocephalic, atraumatic. Pt w/ chronic skull deformities, no active bleeding. Mild swelling noted to forehead, no overlying ecchymosis   EYES: PERRL, EOM intact, conjunctiva and sclera are clear bilaterally. No raccoon eyes.   ENT: No bustos signs   NECK: Supple, non-tender  BACK: No midline or paraspinal TTP of cervical/thoracic/lumbar spine, FROM.   CARDIOVASCULAR: No chest wall ttp   RESPIRATORY: Normal respiratory effort; breath sounds CTAB, no wheezes, rhonchi, or rales.   ABDOMEN: Soft; non-tender, non-distended. Normal bowel sounds x 4.   EXTREMITIES: Full passive and active ROM in all extremities; non-tender to palpation; +2 radial/dp pulses   SKIN: Warm, dry; good skin turgor, no apparent lesions or rashes, no ecchymosis, brisk capillary refill.  NEURO: Sensory and motor functions are grossly intact. Speech at baseline. Patient at baseline mental status as per group home staff

## 2022-07-27 NOTE — ED PROVIDER NOTE - NS ED ROS FT
Pt w/ hx of autism, pervasive developmental d/, limited verbal communication   Not complaining of pain at this time
,DirectAddress_Unknown

## 2022-07-27 NOTE — ED PROVIDER NOTE - NS ED ATTENDING STATEMENT MOD
This was a shared visit with the RUDY. I reviewed and verified the documentation and independently performed the documented:

## 2022-07-27 NOTE — ED PROVIDER NOTE - PATIENT PORTAL LINK FT
You can access the FollowMyHealth Patient Portal offered by [facility] by registering at the following website: [email or web address]. By joining [employer]’s FollowMyHealth portal, you will also be able to view your health information using other applications (apps) compatible with our system.
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
## 2022-07-27 NOTE — ED PROVIDER NOTE - CROS ED ROS STATEMENT
ADMIT DATE:  08/04/2018



CHIEF COMPLAINT:  Agitation.



HISTORY OF PRESENT ILLNESS:  This is an 88-year-old male who was admitted from a

skilled nursing facility to the Emergency Room of [facility]

due to increase in agitation.



REVIEW OF SYSTEMS:

GENERAL:  This is an 88-year-old male that appears as stated.  Denies weight

loss.  Denies weakness.

HEAD:  Denies headache.  Denies dizziness.

EYES:  Denies eye pain.  Denies blurring of vision.

NECK:  Denies neck pain.  Denies nuchal rigidity.

CHEST:  Denies chest pain.  Denies palpitation.

PULMONARY:  Denies shortness of breath.  Denies coughing.

GASTROINTESTINAL:  Denies abdominal pain.  Denies diarrhea.  Denies

constipation.

MUSCULOSKELETAL:  Denies joint pain.  Denies muscle pain.



SOCIAL HISTORY:  The patient lives in a skilled nursing facility prior to

hospitalization.



FAMILY HISTORY:  Unremarkable.



PAST SURGICAL HISTORY:  Unremarkable.



PAST MEDICAL HISTORY:  Includes osteoarthritis, gastroesophageal reflux disease,

iron deficiency anemia, hypertension, and osteoarthritis.



PSYCHIATRIC HISTORY:  Includes dementia.



PHYSICAL EXAMINATION:

VITAL SIGNS:  Temperature 97.6, heart rate of 80, blood pressure 129/71,

respirations of 19, and 98% on room air.

HEENT:  Head is atraumatic, normocephalic.  Eyes:  Bilateral conjunctivae are

clear.  Bilateral pupils are equally round and reactive.

NECK:  Supple.  No JVD.

CARDIOVASCULAR:  S1 and S2, without murmur.

PULMONARY:  Clear to auscultation.

GASTROINTESTINAL:  Soft and nontender without guarding.  Positive bowel sounds.

MUSCULOSKELETAL:  No clubbing.  No cyanosis noted.



ASSESSMENT:

1.  Dementia.

2.  Hypertension.

3.  Gastroesophageal reflux disease.

4.  Iron-deficiency anemia.



PLAN:  We will admit the patient to senior mental health unit.  We will do

medication reconciliation accordingly.  We will follow up with the psychiatrist

to monitor the patient's condition and behavior.  Treatment plans were discussed

with the patient's nurse.  Treatment plans were discussed with Dr. Travis.





DD: 08/04/2018 08:34

DT: 08/04/2018 10:01

JOB# [id number]  [id number] all other ROS negative except as per HPI

## 2022-08-05 NOTE — PATIENT PROFILE ADULT - VISION (WITH CORRECTIVE LENSES IF THE PATIENT USUALLY WEARS THEM):
Normal vision: sees adequately in most situations; can see medication labels, newsprint You can access the FollowMyHealth Patient Portal offered by [facility] by registering at the following website: [email or web address]. By joining [employer]’s FollowMyHealth portal, you will also be able to view your health information using other applications (apps) compatible with our system.

## 2022-08-09 NOTE — ED PROVIDER NOTE - NS ED MD EM SELECTION
Finasteride Counseling:  I discussed with the patient the risks of use of finasteride including but not limited to decreased libido, decreased ejaculate volume, gynecomastia, and depression. Women should not handle medication.  All of the patient's questions and concerns were addressed. Finasteride Male Counseling: Finasteride Counseling:  I discussed with the patient the risks of use of finasteride including but not limited to decreased libido, decreased ejaculate volume, gynecomastia, and depression. Women should not handle medication.  All of the patient's questions and concerns were addressed. [id number] [facility]

## 2023-06-05 ENCOUNTER — EMERGENCY (EMERGENCY)
Facility: HOSPITAL | Age: 33
LOS: 1 days | Discharge: HOME | End: 2023-06-05
Admitting: EMERGENCY MEDICINE
Payer: COMMERCIAL

## 2023-06-05 VITALS
TEMPERATURE: 98 F | RESPIRATION RATE: 18 BRPM | OXYGEN SATURATION: 100 % | SYSTOLIC BLOOD PRESSURE: 129 MMHG | HEART RATE: 79 BPM | DIASTOLIC BLOOD PRESSURE: 82 MMHG

## 2023-06-05 DIAGNOSIS — Z98.811 DENTAL RESTORATION STATUS: Chronic | ICD-10-CM

## 2023-06-05 PROCEDURE — 99283 EMERGENCY DEPT VISIT LOW MDM: CPT

## 2023-06-05 NOTE — ED PROVIDER NOTE - MUSCULOSKELETAL, MLM
Spine appears normal, range of motion is not limited, no muscle or joint tenderness no midline tenderness no signs of trauma.
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
## 2023-06-05 NOTE — ED PROVIDER NOTE - PATIENT PORTAL LINK FT
You can access the FollowMyHealth Patient Portal offered by [facility] by registering at the following website: [email or web address]. By joining [employer]’s FollowMyHealth portal, you will also be able to view your health information using other applications (apps) compatible with our system.
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
## 2023-09-12 NOTE — ED PROVIDER NOTE - HISTORY ATTESTATION, MLM
Subjective:       Patient ID: Nabil Urias is a 37 y.o. male.    Chief Complaint: Hyperlipidemia and Hypertension    Patient presents today following up for hypertension, fatigue, dizziness, headache, and history of DVT and PE.   He is here today for routine follow up and refills. He has had recurrent chest pain, anxiety, and ER visits. He was most recently given Xanax by Sarahy Bay NP working in cardiology. 10 pills were dispensed. 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 He feels the xanax has helped him more than any other medications he has tried in the past and wants to take it twice per day every day. I will advise on this medication and provide a 30 days supply only. Follow up with psychiatry for medication management will be required.  Patient is obese with a BMI of 36.75    Dizziness:   Chronicity:  Recurrent  Onset:  More than 1 month ago  Progression since onset:  Resolved  Frequency:  Every few days  Severity:  Moderate  Dizziness characteristics:  Off-balance, sensation of movement and trouble focusing eyes   Associated symptoms: headaches and visual disturbances.no hearing loss, no ear congestion, no ear pain, no fever, no tinnitus, no nausea, no vomiting, no diaphoresis, no aural fullness, no weakness, no light-headedness, no syncope, no palpitations, no panic, no facial weakness, no slurred speech and no numbness in extremities.  Aggravated by:  Exertion  Treatments tried:  Rest (blood pressure medication)  Improvements on treatment:  Resolved and significant   PMH includes: anxiety.no head trauma, no head trauma and no ear infections.  Hypertension  This is a chronic problem. The current episode started more than 1 month ago. The problem has been waxing and waning since onset. The problem is uncontrolled. Associated symptoms include anxiety, blurred vision, headaches and malaise/fatigue. Pertinent negatives include no neck pain,  orthopnea, palpitations, peripheral edema, PND or sweats. Risk factors for coronary artery disease include sedentary lifestyle, male gender, obesity, dyslipidemia and stress. Past treatments include lifestyle changes, beta blockers and angiotensin blockers. The current treatment provides significant improvement. Compliance problems include exercise and diet.  There is no history of angina.   Fatigue  This is a recurrent problem. The current episode started more than 1 month ago. The problem occurs daily. The problem has been waxing and waning. Associated symptoms include fatigue and headaches. Pertinent negatives include no abdominal pain, arthralgias, chills, congestion, coughing, diaphoresis, fever, nausea, neck pain, rash, sore throat, vomiting or weakness. The symptoms are aggravated by stress and exertion. He has tried lying down, rest, position changes and relaxation for the symptoms. The treatment provided moderate relief.     Review of Systems   Constitutional:  Positive for activity change, fatigue and malaise/fatigue. Negative for appetite change, chills, diaphoresis, fever and unexpected weight change.        Obesity   HENT:  Negative for congestion, ear discharge, ear pain, hearing loss, sore throat, tinnitus, trouble swallowing and voice change.    Eyes:  Positive for blurred vision. Negative for photophobia, pain and visual disturbance.   Respiratory:  Negative for cough and chest tightness.    Cardiovascular:  Negative for palpitations, orthopnea, leg swelling, syncope and PND.        Hypertension, hyperlipidemia   Gastrointestinal:  Negative for abdominal pain, constipation, diarrhea, nausea and vomiting.   Endocrine: Negative for cold intolerance and heat intolerance.        Elevated fasting glucose   Genitourinary:  Negative for difficulty urinating, dysuria and flank pain.   Musculoskeletal:  Negative for arthralgias, gait problem and neck pain.   Skin:  Negative for rash.   Allergic/Immunologic:  Negative for immunocompromised state.   Neurological:  Positive for dizziness and headaches. Negative for weakness and light-headedness.   Hematological:  Negative for adenopathy.   Psychiatric/Behavioral:  Positive for sleep disturbance. Negative for agitation, confusion, self-injury and suicidal ideas. The patient is nervous/anxious.        Past Medical History:   Diagnosis Date    Atrial fibrillation     Essential (primary) hypertension     History of cardiac radiofrequency ablation     Hyperlipidemia       Past Surgical History:   Procedure Laterality Date    ABLATION OF ARRHYTHMOGENIC FOCUS FOR ATRIAL FIBRILLATION N/A 2021    Procedure: ABLATION, ARRHYTHMOGENIC FOCUS, FOR ATRIAL FIBRILLATION;  Surgeon: Daniel Handy MD;  Location: [facility] EP LAB;  Service: Cardiology;  Laterality: N/A;  AF, PVI, GABRIELA (Cx if SR), PVI, RFA, CARTO, GEN, GP, 3 PREP    CARDIOVERSION      TONSILLECTOMY         Family History   Problem Relation Age of Onset    Heart attack Maternal Grandfather        Social History     Socioeconomic History    Marital status:    Tobacco Use    Smoking status: Former     Current packs/day: 0.00     Average packs/day: 1 pack/day for 5.0 years (5.0 ttl pk-yrs)     Types: Cigarettes     Start date:      Quit date: 2015     Years since quittin.0    Smokeless tobacco: Current     Types: Chew    Tobacco comments:     1/2 can chew qd   Substance and Sexual Activity    Alcohol use: Yes     Alcohol/week: 4.0 standard drinks of alcohol     Types: 4 Cans of beer per week     Comment: rarely    Drug use: Yes     Frequency: 1.0 times per week     Types: Marijuana     Comment: Hx: Heroin (last use 2012) smokes weed ocassionally     Sexual activity: Yes     Partners: Female   Social History Narrative    ** Merged History Encounter **          Social Determinants of Health     Transportation Needs: No Transportation Needs (2023)    PRAPARE - Transportation     Lack of  Transportation (Medical): No     Lack of Transportation (Non-Medical): No   Stress: Stress Concern Present (12/16/2020)    [place] [place] [employer] - Occupational Stress Questionnaire     Feeling of Stress : To some extent   Social Connections: Unknown (8/18/2023)    Social Connection and Isolation Panel [NHANES]     Marital Status:        Current Outpatient Medications   Medication Sig Dispense Refill    amLODIPine (NORVASC) 5 MG tablet Take 1 tablet (5 mg total) by mouth once daily. 30 tablet 2    apixaban (ELIQUIS) 5 mg Tab Take 1 tablet (5 mg total) by mouth 2 (two) times daily. 60 tablet 11    aspirin (ECOTRIN) 81 MG EC tablet Take 1 tablet (81 mg total) by mouth once daily. 30 tablet 2    atorvastatin (LIPITOR) 40 MG tablet Take 1 tablet (40 mg total) by mouth once daily. 90 tablet 3    fenofibrate 160 MG Tab Take 1 tablet (160 mg total) by mouth once daily. 30 tablet 2    hydroCHLOROthiazide (HYDRODIURIL) 12.5 MG Tab Take 1 tablet (12.5 mg total) by mouth once daily. 30 tablet 2    ibuprofen (ADVIL,MOTRIN) 200 MG tablet Take 200 mg by mouth every 6 (six) hours as needed for Pain.      magnesium oxide (MAG-OX) 400 mg (241.3 mg magnesium) tablet Take 1 tablet (400 mg total) by mouth once daily. 90 tablet 3    metoprolol tartrate (LOPRESSOR) 25 MG tablet Take 1 tablet (25 mg total) by mouth 2 (two) times daily. 60 tablet 11    omeprazole (PRILOSEC) 20 MG capsule Take 20 mg by mouth once daily.      valsartan (DIOVAN) 320 MG tablet Take 1 tablet (320 mg total) by mouth once daily. 90 tablet 1    ALPRAZolam (XANAX) 0.5 MG tablet Take 1 tablet (0.5 mg total) by mouth nightly as needed for Anxiety. 30 tablet 0     No current facility-administered medications for this visit.       Review of patient's allergies indicates:  No Known Allergies  Objective:      Blood pressure 128/74, pulse 101, temperature 97.9 °F (36.6 °C), height 6' (1.829 m), weight 122.9 kg (271 lb), SpO2 99 %. Body mass index  is 36.75 kg/m².   Physical Exam  Vitals and nursing note reviewed.   Constitutional:       General: He is not in acute distress.     Appearance: Normal appearance. He is well-developed. He is obese. He is not ill-appearing.   HENT:      Head: Normocephalic and atraumatic.      Right Ear: Tympanic membrane, ear canal and external ear normal.      Left Ear: Tympanic membrane, ear canal and external ear normal.      Nose: Nose normal.      Mouth/Throat:      Mouth: Mucous membranes are moist.      Pharynx: Uvula midline.   Eyes:      General: Lids are normal.      Extraocular Movements: Extraocular movements intact.      Conjunctiva/sclera: Conjunctivae normal.      Pupils: Pupils are equal, round, and reactive to light.   Cardiovascular:      Rate and Rhythm: Normal rate and regular rhythm.      Pulses: Normal pulses.      Heart sounds: Normal heart sounds, S1 normal and S2 normal. No murmur heard.  Pulmonary:      Effort: Pulmonary effort is normal. No respiratory distress.      Breath sounds: Normal breath sounds. No wheezing.   Abdominal:      General: Bowel sounds are normal.      Palpations: Abdomen is soft.      Tenderness: There is no abdominal tenderness.   Musculoskeletal:         General: Normal range of motion.      Cervical back: Normal range of motion and neck supple.   Lymphadenopathy:      Cervical: No cervical adenopathy.   Skin:     General: Skin is warm and dry.      Findings: No rash.   Neurological:      General: No focal deficit present.      Mental Status: He is alert and oriented to person, place, and time. Mental status is at baseline.   Psychiatric:         Mood and Affect: Mood is anxious.         Speech: Speech normal.         Behavior: Behavior normal.         Thought Content: Thought content normal.         Cognition and Memory: Cognition normal.      Comments: Patient is more pressed than expected on obtaining more xanax to take and increase the dose to bid.              Assessment:        1. Primary hypertension    2. Insomnia, unspecified type    3. Mixed hyperlipidemia    4. Hypertriglyceridemia    5. Impaired fasting glucose    6. Muscle spasm    7. Paroxysmal atrial fibrillation    8. RAMIRO (obstructive sleep apnea)    9. Current use of long term anticoagulation    10. TIA (transient ischemic attack)    11. Gastroesophageal reflux disease without esophagitis    12. Anxiety          Plan:       Nabil was seen today for hyperlipidemia and hypertension.    Diagnoses and all orders for this visit:    Primary hypertension  -     Microalbumin/Creatinine Ratio, Urine; Future  -     CBC Auto Differential; Future  -     Comprehensive Metabolic Panel; Future  -     Urinalysis; Future  Lifestyle changes: Reduce the amount of salt in your diet; Lose weight; Avoid drinking too much alcohol; Exercise at least 30 minutes per day most days of the week.  Continue current medications and home BP monitoring.      Insomnia, unspecified type  Patient states he is only obtaining 2 hours of sleep per night but with the xanax at night, he can sleep 4 hours.    Mixed hyperlipidemia  -     Lipid Panel; Future  Limit red meat, butter, fried foods, cheese, and other foods that have a lot of saturated fat. Consume more: lean meats, fish, fruits, vegetables, whole grains, beans, lentils, and nuts.  Weight loss, and 30-45 min of cardiovascular exercise daily.      Hypertriglyceridemia  Limit red meat, butter, fried foods, cheese, and other foods that have a lot of saturated fat. Consume more: lean meats, fish, fruits, vegetables, whole grains, beans, lentils, and nuts.  Weight loss, and 30-45 min of cardiovascular exercise daily.      Impaired fasting glucose  -     Hemoglobin A1C; Future  -     Microalbumin/Creatinine Ratio, Urine; Future    Muscle spasm    Paroxysmal atrial fibrillation  Stable    RAMIRO (obstructive sleep apnea)  Counseled on use of equipment at home    Current use of long term anticoagulation  Stable    TIA  (transient ischemic attack)    Gastroesophageal reflux disease without esophagitis  Continue current medication    Anxiety  -     Ambulatory referral/consult to Psychiatry; Future  -     ALPRAZolam (XANAX) 0.5 MG tablet; Take 1 tablet (0.5 mg total) by mouth nightly as needed for Anxiety.        I spent a total of 30 minutes on the day of the visit.This includes face to face time and non-face to face time preparing to see the patient (eg, review of tests), obtaining and/or reviewing separately obtained history, documenting clinical information in the electronic or other health record, independently interpreting results and communicating results to the patient/family/caregiver, or care coordinator.            Follow up with psychiatry  Follow up with me in 3 months with labs prior to office visit.     I have reviewed and confirmed nurses' notes...

## 2023-10-03 NOTE — ASU PREOP CHECKLIST - TEMPERATURE IN FAHRENHEIT (DEGREES F)
97.3 O-T Plasty Text: The defect edges were debeveled with a #15 scalpel blade. Given the location of the defect, shape of the defect and the proximity to free margins an O-T plasty was deemed most appropriate. Using a sterile surgical marker, an appropriate O-T plasty was drawn incorporating the defect and placing the expected incisions within the relaxed skin tension lines where possible. The area thus outlined was incised deep to adipose tissue with a #15 scalpel blade. The skin margins were undermined to an appropriate distance in all directions utilizing iris scissors. Following this, the designed flap was carried over into the primary defect and sutured into place.

## 2023-12-23 NOTE — ED ADULT TRIAGE NOTE - CHIEF COMPLAINT QUOTE
pt with MR from [facility], sent in by facility Rn for cough x 1 day. pt denies complaints, acting at baseline as per staff.
107

## 2024-01-20 ENCOUNTER — NON-APPOINTMENT (OUTPATIENT)
Age: 34
End: 2024-01-20

## 2024-03-25 NOTE — ASU PREOP CHECKLIST - BMI (KG/M2)
27.9
No. CANDELARIA screening performed.  STOP BANG Legend: 0-2 = LOW Risk; 3-4 = INTERMEDIATE Risk; 5-8 = HIGH Risk

## 2024-04-22 NOTE — H&P PST ADULT - LYMPH NODES
[facility]- Out[facility]   [address], [place] [address]   office: [phone] fax: [phone]    Physical Therapy: TREATMENT/PROGRESS NOTE   Patient: Hakeem Newton (65 y.o. male)   Examination Date: 2024   :  1958 MRN: [id number]   Visit #: 2   Insurance Allowable Auth Needed   30 (5 previous this calendar year) [x]Yes    []No    Ins[facility]: [facility] [place] Plan: [employer] [place] MEDICA[place] / Product Type: *No Product type* /   Insurance ID: [id number] - (Medicaid Managed)  Secondary Insurance (if applicable):    Treatment Diagnosis:     ICD-10-CM    1. Acute pain of right knee  M25.561          Medical Diagnosis:  Unilateral primary osteoarthritis, right knee [M17.11]   Referring Physician: Braden Villa MD  PCP: Lynn Berger APRN - CNP     Plan of care signed (Y/N):     Date of Patient follow up with Physician: 24     Progress Report/POC: NO  POC update due: (10 visits /OR AUTH LIMITS, whichever is less)  2024                                             Precautions/ Contra-indications:           Latex allergy:  NO  Pacemaker:    NO  Contraindications for Manipulation: recent surgical history (relative)  Date of Surgery: 4/15/24  Other:    SUBJECTIVE EXAMINATION     Patient stated complaint/comments: Pt reports that he is feeling better each day.       Test used Initial score  2024   Pain Summary VAS 5/10 R knee 3/10 R knee   Functional questionnaire LEFS 16/80 = 80% impairment    Other:                OBJECTIVE EXAMINATION     Gait:   Dysfunction Noted  Gait Deviations: antalgic pattern  decreased josephine  Assistive Device Used: Rolling walker (RW)  Level of Assistance: Independent    Balance:     NT    Functional Tests:  NT    Exercises/Interventions     Observation: Increased quad tone with VMS Burst + quad set.    Therapeutic Ex (51518)  Resistance Sets/time Reps Notes/Cues/Progressions   NuStep (Seat 
No lymphadedenopathy

## 2024-05-11 ENCOUNTER — NON-APPOINTMENT (OUTPATIENT)
Age: 34
End: 2024-05-11

## 2024-05-27 ENCOUNTER — EMERGENCY (EMERGENCY)
Facility: HOSPITAL | Age: 34
LOS: 1 days | Discharge: HOME | End: 2024-05-27
Attending: STUDENT IN AN ORGANIZED HEALTH CARE EDUCATION/TRAINING PROGRAM | Admitting: STUDENT IN AN ORGANIZED HEALTH CARE EDUCATION/TRAINING PROGRAM
Payer: MEDICAID

## 2024-05-27 VITALS
HEART RATE: 91 BPM | TEMPERATURE: 98 F | OXYGEN SATURATION: 99 % | SYSTOLIC BLOOD PRESSURE: 127 MMHG | RESPIRATION RATE: 16 BRPM | DIASTOLIC BLOOD PRESSURE: 86 MMHG

## 2024-05-27 DIAGNOSIS — Z98.811 DENTAL RESTORATION STATUS: Chronic | ICD-10-CM

## 2024-05-27 PROCEDURE — 93010 ELECTROCARDIOGRAM REPORT: CPT

## 2024-05-27 PROCEDURE — 99284 EMERGENCY DEPT VISIT MOD MDM: CPT

## 2024-05-27 NOTE — ED PROVIDER NOTE - QTC
405 Rhombic Flap Text: The defect edges were debeveled with a #15 scalpel blade.  Given the location of the defect and the proximity to free margins a rhombic flap was deemed most appropriate.  Using a sterile surgical marker, an appropriate rhombic flap was drawn incorporating the defect.    The area thus outlined was incised deep to adipose tissue with a #15 scalpel blade.  The skin margins were undermined to an appropriate distance in all directions utilizing iris scissors.

## 2024-05-27 NOTE — ED ADULT TRIAGE NOTE - CHIEF COMPLAINT QUOTE
pt brought to ED with staff from [facility], pt requiring support to ambulate, as per note from [facility], pt given wrong meds around 6:45 and had fast heart rate. pt autistic , non-verbal at baseline.

## 2024-05-27 NOTE — ED ADULT NURSE NOTE - OBJECTIVE STATEMENT
Patient coming from group home stating he was given the wrong meds this morning. Patient noted feeling tired at this time. Patient alert and responsive. No distress noted. Patient hx of Autism, non-verbal at baseline. Breathing non-labored. No s/s of SOB noted. Staff members present with patient. Plan of care in progress. Patient coming from group home stating he was given the wrong meds this morning. Patient noted feeling tired at this time. Patient alert and responsive. No distress noted. Patient hx of Autism. Breathing non-labored. Denies CP, no s/s of SOB noted. Staff members present with patient. Plan of care in progress.

## 2024-05-27 NOTE — ED PROVIDER NOTE - PATIENT PORTAL LINK FT
You can access the FollowMyHealth Patient Portal offered by [facility] by registering at the following website: [email or web address]. By joining "[employer]’s FollowMyHealth portal, you will also be able to view your health information using other applications (apps) compatible with our system.

## 2024-05-27 NOTE — ED PROVIDER NOTE - OBJECTIVE STATEMENT
34-year-old male past medical history of autism/MR, hypothyroidism presented to the emergency room for evaluation for concern of incident drug overdose. Presenting from [facility] with aids.  Patient received another [facility] residents medications this morning.  These medications include lithium 300 mg, 1 at the scene 2 mg, quetiapine 300 mg and carbamazepine 200 mg at 7 AM.  Patient was sent in for evaluation due to this incidental ingestion.  Patient aide states that he is more drowsy than usual however has not had any nausea vomiting diarrhea, syncope. Patient ambulating without difficulty.

## 2024-05-27 NOTE — ED PROVIDER NOTE - CLINICAL SUMMARY MEDICAL DECISION MAKING FREE TEXT BOX
34-year-old male past medical history of autism/MR, hypothyroidism presented to the emergency room for evaluation for concern of incident drug overdose. Presenting from [facility] with aids.  Patient received another [facility] residents medications this morning.  These medications include lithium 300 mg, 1 at the scene 2 mg, quetiapine 300 mg and carbamazepine 200 mg at 7 AM.  Patient was sent in for evaluation due to this incidental ingestion.  Patient aide states that he is more drowsy than usual however has not had any nausea vomiting diarrhea, syncope. Patient ambulating without difficulty.   Vital signs stable, afebrile, not hypoxic. EKG NSR without ischemic changes.

## 2024-05-27 NOTE — ED PROVIDER NOTE - PHYSICAL EXAMINATION
Richar Ramachandran DO (PGY2)   Physical Exam:    Gen: NAD, AOx3  Head: NCAT  HEENT: EOMI, PEERLA  Lung: CTAB, no respiratory distress, no wheezes/rhonchi/rales B/L  CV: RRR, no murmurs, rubs or gallops  Abd: soft, NT, ND, no guarding, no rigidity, no rebound tenderness, no CVA tenderness   MSK: no visible deformities, ROM normal in UE/LE, no back pain  Neuro: No focal sensory or motor deficits. Sensation intact to light touch all extremities.  Skin: Warm, well perfused, no rash, no leg swelling  Psych: normal affect, calm

## 2024-05-27 NOTE — ED PROVIDER NOTE - PROGRESS NOTE DETAILS
Richar Ramachandran DO (PGY2)  Patient observed for 1 hour.  No change in vital signs or mental status.  Patient ambulating to bathroom acting appropriately.  Will discharge with aides back to group home.  Strict return precautions discussed.  Answered all questions for age prior to discharge will discharge

## 2024-05-27 NOTE — ED ADULT NURSE NOTE - CHIEF COMPLAINT QUOTE
pt brought to ED with staff from [facility], pt requiring support to ambulate, as per note from [facility], pt given wrong meds around 6:45 and had fast heart rate. pt autistic , non-verbal at baseline.

## 2024-05-27 NOTE — ED PROVIDER NOTE - NSFOLLOWUPINSTRUCTIONS_ED_ALL_ED_FT
- You were seen in the emergency department today for ACCIDENTAL INGESTION    -YOU HAD AN EKG PERFORMED, VITAL SIGNS AND WERE OBSERVED    -PLEASE RETURN TO THE EMERGENCY DEPARTMENT FOR ANY WORSENING CHANGE IN MENTAL STATUS    -PLEASE BE CAREFUL ABOUT WHICH DRUGS ARE ADMINISTERED TO THE PATIENT    - Lab and imaging results, if performed, were discussed with you along with your discharge diagnosis    - Follow up with your doctor in 1 week - bring copies of your results if you were given. If you are supposed  to follow up with a specialist, please bring your results with you as well.     - Return to the ED for any new, worsening, or concerning symptoms to you    - Continue all prescribed medications.    - Take ibuprofen/tylenol as directed as needed for pain.     - Rest and keep yourself hydrated with fluids. - You were seen in the emergency department today for ACCIDENTAL INGESTION    -YOU HAD AN EKG PERFORMED, VITAL SIGNS AND WERE OBSERVED    -PLEASE RETURN TO THE EMERGENCY DEPARTMENT FOR ANY WORSENING CHANGE IN MENTAL STATUS    -PLEASE BE CAREFUL ABOUT WHICH DRUGS ARE ADMINISTERED TO THE PATIENT    Patient should not receive his antiepileptic medicine today as he already received carbamazepine accidentally    - Lab and imaging results, if performed, were discussed with you along with your discharge diagnosis    - Follow up with your doctor in 1 week - bring copies of your results if you were given. If you are supposed  to follow up with a specialist, please bring your results with you as well.     - Return to the ED for any new, worsening, or concerning symptoms to you    - Continue all prescribed medications.    - Take ibuprofen/tylenol as directed as needed for pain.     - Rest and keep yourself hydrated with fluids.

## 2024-05-27 NOTE — ED PROVIDER NOTE - ATTENDING CONTRIBUTION TO CARE
34-year-old male past medical history autism, disability, hypothyroidism presents to ED after dispensed wrong medication group home.  Per staff at bedside report patient was given the wrong cup.  She was the medications listed above in HPI.  Per staff patient ambulating and acting at his baseline, conversant at his baseline.  Patient noted to be more somnolent than typical.  Patient able to answer simple questions, denies any pain, nausea, vomiting.  Per staff patient ate breakfast at 8 AM without issue.    Exam as above.  Patient speaking at baseline, following commands, mildly somnolent but ambulating with steady gait.    Somnolent as expected for medication.  Fingerstick and EKG without significant abnormality.  EKG normal sinus rhythm without acute interval derangements.  Will observe in ED.  No indication for blood work at this time.  Will discharge back to her residence.

## 2024-07-08 NOTE — ASU DISCHARGE PLAN (ADULT/PEDIATRIC) - DISCHARGE TO
4 Eyes Skin Assessment     NAME:  Sanford Joiner Sr.  YOB: 1960  MEDICAL RECORD NUMBER:  [id number]    The patient is being assessed for  Admission    I agree that at least one RN has performed a thorough Head to Toe Skin Assessment on the patient. ALL assessment sites listed below have been assessed.      Areas assessed by both nurses:    Head, Face, Ears, Shoulders, Back, Chest, Arms, Elbows, Hands, Sacrum. Buttock, Coccyx, Ischium, and Legs. Feet and Heels        Does the Patient have a Wound? Yes wound(s) were present on assessment. LDA wound assessment was Initiated and completed by RN    RLQ driveline site  Mid chest/sternal wound    Pierre Prevention initiated by RN: Yes  Wound Care Orders initiated by RN: No    Pressure Injury (Stage 3,4, Unstageable, DTI, NWPT, and Complex wounds) if present, place Wound referral order by RN under : Yes    New Ostomies, if present place, Ostomy referral order under : No     Nurse 1 eSignature: Electronically signed by MORENA THOMAS RN on 7/8/24 at 7:03 PM EDT    **SHARE this note so that the co-signing nurse can place an eSignature**    Nurse 2 eSignature: Electronically signed by Yenny Lowry RN on 7/10/24 at 10:26 AM EDT     Home

## 2024-07-24 ENCOUNTER — EMERGENCY (EMERGENCY)
Facility: HOSPITAL | Age: 34
LOS: 1 days | Discharge: HOME | End: 2024-07-24
Attending: EMERGENCY MEDICINE | Admitting: EMERGENCY MEDICINE
Payer: MEDICAID

## 2024-07-24 VITALS
OXYGEN SATURATION: 98 % | HEART RATE: 86 BPM | TEMPERATURE: 97 F | SYSTOLIC BLOOD PRESSURE: 142 MMHG | DIASTOLIC BLOOD PRESSURE: 95 MMHG | RESPIRATION RATE: 18 BRPM

## 2024-07-24 DIAGNOSIS — Z98.811 DENTAL RESTORATION STATUS: Chronic | ICD-10-CM

## 2024-07-24 PROCEDURE — 99284 EMERGENCY DEPT VISIT MOD MDM: CPT

## 2024-07-24 NOTE — ED ADULT NURSE NOTE - PRO INTERPRETER NEED 2
English Complex Repair And Z Plasty Text: The defect edges were debeveled with a #15 scalpel blade.  The primary defect was closed partially with a complex linear closure.  Given the location of the remaining defect, shape of the defect and the proximity to free margins a Z plasty was deemed most appropriate for complete closure of the defect.  Using a sterile surgical marker, an appropriate advancement flap was drawn incorporating the defect and placing the expected incisions within the relaxed skin tension lines where possible.    The area thus outlined was incised deep to adipose tissue with a #15 scalpel blade.  The skin margins were undermined to an appropriate distance in all directions utilizing iris scissors.

## 2024-07-24 NOTE — ED PROVIDER NOTE - ATTENDING CONTRIBUTION TO CARE
DR. STEPHENSON, ATTENDING MD-  I performed a face to face bedside interview with the patient regarding history of present illness, review of symptoms and past medical history. I completed an independent physical exam.  I have discussed the patient's plan of care with the fellow.  Documentation as above in the note.    33 y/o male h/o autism here from group home after he had agitated episode and started repeatedly striking his head against a wall.  This is typical behavior for the pt.  Noted to have contusions to head and sent to ED for eval.  Per staff, pt at baseline ms, DR. STEPHENSON, ATTENDING MD-  I performed a face to face bedside interview with the patient regarding history of present illness, review of symptoms and past medical history. I completed an independent physical exam.  I have discussed the patient's plan of care with the fellow.  Documentation as above in the note.    35 y/o male h/o autism here from group home after he had agitated episode and started repeatedly striking his head against a wall.  This is typical behavior for the pt.  Noted to have contusions to head and sent to ED for eval.  Per staff, pt at baseline ms,  No drainage from ears/nose.  No episodes of vomiting.  Medically clear for discharge, to return to ED if any worsening symptoms, otherwise close pcp f/u for re-evaluation.

## 2024-07-24 NOTE — ED PROVIDER NOTE - PHYSICAL EXAMINATION
Physical Exam:  General: NAD, Conversive  Eyes: EOMI, Conjunctiva and sclera clear  Head: Contusion to forehead and right side of head, no laceration  Neck: No JVD, no midline cervical tenderness, normal ROM  Lungs: No respiratory distress  Heart: Normal S1, S2, no murmurs  Abdomen: Soft, nontender, nondistended, no CVA tenderness  Extremities: 2+ peripheral pulses, no edema, old healing abrasion to left knee.  MSK: No signs of trauma except contusions to head and old abrasion to left knee  Psych: AAO X3  Neurologic: Intact cranial nerves, normal strength and sensation of all 4 extremities, PERRL, normal gait

## 2024-07-24 NOTE — ED PROVIDER NOTE - PATIENT PORTAL LINK FT
You can access the FollowMyHealth Patient Portal offered by [facility] by registering at the following website: [email or web address]. By joining [employer]’s FollowMyHealth portal, you will also be able to view your health information using other applications (apps) compatible with our system.

## 2024-07-24 NOTE — ED PROVIDER NOTE - NSFOLLOWUPINSTRUCTIONS_ED_ALL_ED_FT
Patient medically cleared for discharge from ED back to home.  If patient is not acting like himself or any other new or concerning symptoms develop, return to the ER immediately.  Follow-up with your primary care doctor in the next 1 to 2 days to discuss your visit to the ED.      Overview  Most injuries to the head are minor. Bumps, cuts, and scrapes on the head and face usually heal well and can be treated the same as injuries to other parts of the body.    Although it's rare, once in a while a more serious problem shows up after you are home. So it's good to be on the lookout for symptoms for a day or two.    Follow-up care is a key part of your treatment and safety. Be sure to make and go to all appointments, and call your doctor if you are having problems. It's also a good idea to know your test results and keep a list of the medicines you take.    How can you care for yourself at home?  Follow your doctor's instructions. The doctor will tell you if you need someone to watch you closely for the next 24 hours or longer.  Take it easy for the next few days or more if you are not feeling well.  Ask your doctor when it's okay for you to go back to activities like driving a car, riding a bike, or operating machinery.

## 2024-07-24 NOTE — ED ADULT NURSE NOTE - CHIEF COMPLAINT QUOTE
arrives with staff, from [facility] here for head injury this morning. arrives with helmet. redness noted and contusion to top of head. hx of autism, developmental delay

## 2024-07-24 NOTE — ED PROVIDER NOTE - CLINICAL SUMMARY MEDICAL DECISION MAKING FREE TEXT BOX
This is a 34-year-old male with history as above presenting for head injury.  Vitals unremarkable, exam as above.  Given head injury and clinical features, per [place] head rules can exclude from imaging.  No indication of any other traumatic injuries and can clear from neck injury from [place] CT C-spine rules.  Currently acting at baseline and this is his baseline mental status and behavior so low concern for any acute intracranial pathology or encephalopathy causing this disturbance.  Will discharge with return precautions, follow-up instructions.

## 2024-07-24 NOTE — ED ADULT TRIAGE NOTE - CHIEF COMPLAINT QUOTE
arrives with staff, from [facility] here for head injury this morning. arrives with helmet. redness noted and contusion to top of head. hx of autism, developmental delay

## 2024-07-24 NOTE — ED ADULT NURSE NOTE - OBJECTIVE STATEMENT
35 y/o M arrives to E.D. TR-A c/o abrasion to top of head s/p intentionally hitting it on a wooden door. PMHx: autism, developmental delay. Pt is a&ox4, ambulatory, neg SOB, denies CP, neg N/V/D. Pt arrives wearing helmet. Group home aides deny LOC and state that pt is at baseline behavior. No active bleeding noted. Denies any other complaints. Frequent monitoring in place.

## 2024-07-24 NOTE — ED PROVIDER NOTE - OBJECTIVE STATEMENT
This is a 34-year-old male with history of developmental delay, behavioral disturbance, autism presenting for head injury.  This morning patient had a behavioral outburst and banged his head against the wall.  This is not abnormal for him and he is currently at his baseline mental status.  He did not pass out.  He does not take blood thinners.  He is walking normally.  Sent in from his group home for medical clearance.

## 2024-07-24 NOTE — ED ADULT NURSE NOTE - AS PAIN REST
4 (moderate pain) Pt's wife Kavita called regarding questions about  medication. Please call [phone]

## 2024-07-26 RX ORDER — ACETAMINOPHEN 325 MG
2 TABLET ORAL
Qty: 36 | Refills: 0
Start: 2024-07-26 | End: 2024-07-28

## 2024-07-26 NOTE — ED POST DISCHARGE NOTE - RESULT SUMMARY
patient guardian called requesting rx for tylenol or ibuprofen be sent to pharmacy for coverage of cost. Patient seen in ED with head injury. Symptoms stable. Rx for Tylenol sent. Strict ED return precautions discussed. Patient understands and agrees.

## 2024-10-09 NOTE — ED PROVIDER NOTE - OBJECTIVE STATEMENT
29 yo male with PMHx of MR/Autism with self injurious behavior, HLD, hypothyroid presenting from group home with cough and fever.  Aid from group home reports that patient has had cough x 4 days.  Now associated with fevers, tmax 103.  no nasal congestion.  No sick contacts at group home or recent travel.  Has not left group home in 2 months per aid. Detail Level: Detailed Show Aperture Variable?: Yes Duration Of Freeze Thaw-Cycle (Seconds): 5 Consent: The patient's verbal consent was obtained including but not limited to risks of crusting, scabbing, blistering, scarring, darker or lighter pigmentary change, recurrence, incomplete removal and infection. Post-Care Instructions: I reviewed with the patient in detail post-care instructions. Patient is to wear sunprotection, and avoid picking at any of the treated lesions. Pt may shower as normal, and apply Vaseline to crusted or scabbing areas. Render Note In Bullet Format When Appropriate: No Number Of Freeze-Thaw Cycles: 2 freeze-thaw cycles

## 2024-11-14 ENCOUNTER — OUTPATIENT (OUTPATIENT)
Dept: OUTPATIENT SERVICES | Facility: HOSPITAL | Age: 34
LOS: 1 days | End: 2024-11-14
Payer: MEDICAID

## 2024-11-14 VITALS
HEIGHT: 60.5 IN | TEMPERATURE: 97 F | RESPIRATION RATE: 16 BRPM | SYSTOLIC BLOOD PRESSURE: 131 MMHG | HEART RATE: 75 BPM | WEIGHT: 151.02 LBS | OXYGEN SATURATION: 100 % | DIASTOLIC BLOOD PRESSURE: 88 MMHG

## 2024-11-14 DIAGNOSIS — K05.6 PERIODONTAL DISEASE, UNSPECIFIED: ICD-10-CM

## 2024-11-14 DIAGNOSIS — Z01.818 ENCOUNTER FOR OTHER PREPROCEDURAL EXAMINATION: ICD-10-CM

## 2024-11-14 DIAGNOSIS — Z98.811 DENTAL RESTORATION STATUS: Chronic | ICD-10-CM

## 2024-11-14 DIAGNOSIS — G40.909 EPILEPSY, UNSPECIFIED, NOT INTRACTABLE, WITHOUT STATUS EPILEPTICUS: ICD-10-CM

## 2024-11-14 DIAGNOSIS — K02.62 DENTAL CARIES ON SMOOTH SURFACE PENETRATING INTO DENTIN: ICD-10-CM

## 2024-11-14 DIAGNOSIS — K02.9 DENTAL CARIES, UNSPECIFIED: ICD-10-CM

## 2024-11-14 LAB
ANION GAP SERPL CALC-SCNC: 14 MMOL/L — SIGNIFICANT CHANGE UP (ref 5–17)
BUN SERPL-MCNC: 8 MG/DL — SIGNIFICANT CHANGE UP (ref 7–23)
CALCIUM SERPL-MCNC: 9.1 MG/DL — SIGNIFICANT CHANGE UP (ref 8.4–10.5)
CHLORIDE SERPL-SCNC: 91 MMOL/L — LOW (ref 96–108)
CO2 SERPL-SCNC: 23 MMOL/L — SIGNIFICANT CHANGE UP (ref 22–31)
CREAT SERPL-MCNC: 0.53 MG/DL — SIGNIFICANT CHANGE UP (ref 0.5–1.3)
EGFR: 135 ML/MIN/1.73M2 — SIGNIFICANT CHANGE UP
GLUCOSE SERPL-MCNC: 69 MG/DL — LOW (ref 70–99)
HCT VFR BLD CALC: 40.7 % — SIGNIFICANT CHANGE UP (ref 39–50)
HGB BLD-MCNC: 14 G/DL — SIGNIFICANT CHANGE UP (ref 13–17)
MCHC RBC-ENTMCNC: 31 PG — SIGNIFICANT CHANGE UP (ref 27–34)
MCHC RBC-ENTMCNC: 34.4 G/DL — SIGNIFICANT CHANGE UP (ref 32–36)
MCV RBC AUTO: 90 FL — SIGNIFICANT CHANGE UP (ref 80–100)
NRBC # BLD: 0 /100 WBCS — SIGNIFICANT CHANGE UP (ref 0–0)
PLATELET # BLD AUTO: 245 K/UL — SIGNIFICANT CHANGE UP (ref 150–400)
POTASSIUM SERPL-MCNC: 4.4 MMOL/L — SIGNIFICANT CHANGE UP (ref 3.5–5.3)
POTASSIUM SERPL-SCNC: 4.4 MMOL/L — SIGNIFICANT CHANGE UP (ref 3.5–5.3)
RBC # BLD: 4.52 M/UL — SIGNIFICANT CHANGE UP (ref 4.2–5.8)
RBC # FLD: 12.3 % — SIGNIFICANT CHANGE UP (ref 10.3–14.5)
SODIUM SERPL-SCNC: 128 MMOL/L — LOW (ref 135–145)
WBC # BLD: 7.79 K/UL — SIGNIFICANT CHANGE UP (ref 3.8–10.5)
WBC # FLD AUTO: 7.79 K/UL — SIGNIFICANT CHANGE UP (ref 3.8–10.5)

## 2024-11-14 PROCEDURE — 85027 COMPLETE CBC AUTOMATED: CPT

## 2024-11-14 PROCEDURE — G0463: CPT

## 2024-11-14 PROCEDURE — 80048 BASIC METABOLIC PNL TOTAL CA: CPT

## 2024-11-14 PROCEDURE — 36415 COLL VENOUS BLD VENIPUNCTURE: CPT

## 2024-11-14 NOTE — H&P PST ADULT - MUSCULOSKELETAL COMMENTS
EP Consult Note    Consulting Physician: Dr. Mena (Dr [facility])     Physician Requesting Consultation: Trevon Crowder MD    Reason for consultation: Exposed pacemaker generator (for opinion regarding further evaluation and management)    History of present illness: Carmencita Razo is a 89 year old female with pertinent past medical history of CAD, ischemic cardiomyopathy s/p BiV ICD '08 with improvement in EF, device downgraded to BiV pacemaker 1/2017, severe aortic stenosis s/p TAVR at [facility] 4/2018, CKD, complete heart block s/p PPM 10/10/2007 (later upgraded to ICD '08), PAF not on anticoagulation who presented to her EP office 7/5/18 with exposed pacemaker. Patient states that on 7/4/18 she noted drainage on her gown. She has an aid help her with ADLs and during her bath noted her pacemaker pocket to be open with device exposed. She was evaluated the next day in the office. Interrogation showed normal functioning. Patient is completely dependent with CHB and pacing at 40 bpm during check. She was directly admitted to [facility]. EP, ID and CV surgery were consulted. Patient was started on IV cefepime and vancomycin. Blood cultures and wounds cultures obtained. Due to patient's age and co-morbidities, recommended transfer to [facility] to consult with Dr. Galvan for device removal. Patient arrived to [facility] yesterday evening. Denies chest pain, SOB, dizziness or palpitations. Remains AV paced at rate of 70 bpm. No fever. Left pectoral pocket with dry gauze over site. Wound culture repeated yesterday evening.     Patient lives independently at home with nursing assistant helping with bathing a few times a week and other ADLs. She does her own cooking.    Past medical history:  Past Medical History:   Diagnosis Date   • Allergy    • Arthritis    • Blood transfusion    • Chronic kidney disease    • Congestive Heart Failure    • COPD (chronic obstructive pulmonary disease) (CMS/[facility])    •  Corneal Dystrophy    • Coronary Artery Disease     s/p stenting, Dr. Rivera   • Diabetes Mellitus     type 2   • Diverticulitis    • Gastro-oesophageal reflux disease    • Hyperlipidemia    • Hypertension    • Irritable bowel syndrome    • Macular Degeneration    • Myocardial infarction (CMS/HCC)    • Neuromuscular disorder (CMS/HCC)    • Other malignant lymphomas, unspecified site, extranodal and solid organ sites    • Other specified intestinal obstruction(560.89)     small bowel   • Peritonitis NEC    • PVD (peripheral vascular disease) (CMS/HCC)        Past surgical history:  Past Surgical History:   Procedure Laterality Date   • Angioplasty  06/21/2006    noncoronary vessel, single vessel   • Appendectomy     • Femur/knee surg unlisted  05/04/2010    Left hemiarthroplasty   • Implnt/repl defib pad/thoracot  11/01/2008    AICD placement, replaced pacemaker   • Inser cuba pacer epicard w thoract      Pacemaker    • Removal gallbladder      Cholecystectomy (gallbladder)   • Removal of tonsils,<11 y/o      Tonsillectomy Alone   • Remv cataract extracap insert lens      Cataract Removal Lens Implant   • Total abdom hysterectomy     • Vertebroplasty  07/01/2011    percutaneous, sacroplasty       Medications:  Current Facility-Administered Medications   Medication   • albuterol inhaler 2 puff   • aspirin chewable 81 mg   • calcium carbonate-vitamin D (CALTRATE+D) 600-400 MG-UNIT tablet 1 tablet   • carvedilol (COREG) tablet 12.5 mg   • furosemide (LASIX) tablet 40 mg   • famotidine (PEPCID) tablet 20 mg   • simvastatin (ZOCOR) tablet 10 mg   • valsartan (DIOVAN) tablet 80 mg   • sodium chloride (PF) 0.9 % injection 2 mL   • sodium chloride (PF) 0.9 % injection 2 mL   • sodium chloride (NORMAL SALINE) 0.9 % bolus 500 mL   • nitroGLYcerin (NITROSTAT) sublingual tablet 0.4 mg   • dextrose 50 % injection 25 g   • dextrose 5 % infusion   • glucagon (GLUCAGEN) injection 1 mg   • dextrose (GLUTOSE) 40 % gel 15 g   • sodium  chloride (PF) 0.9 % injection 20 mL   • potassium chloride (KLOR-CON M) tere ER tablet 20 mEq   • potassium chloride (KLOR-CON) packet 20 mEq   • potassium chloride (KLOR-CON M) tere ER tablet 40 mEq   • potassium chloride (KLOR-CON) packet 40 mEq   • sodium chloride 0.9 % flush bag 500 mL   • heparin (porcine) injection 5,000 Units   • insulin glargine (LANTUS) injection 15 Units   • insulin lispro (HumaLOG) correction dose   • acetaminophen (TYLENOL) tablet 650 mg   • docusate sodium-sennosides (SENOKOT S) 50-8.6 MG 2 tablet   • aluminum-magnesium hydroxide-simethicone (MAALOX) 200-200-20 MG/5ML suspension 30 mL   • magnesium sulfate 1 g in dextrose 5% 100 mL IVPB premix   • magnesium sulfate 2 g in 50 mL premix IVPB   • magnesium sulfate 2 g in 50 mL premix IVPB   • ceFEPIme (MAXIPIME) 1,000 mg in sodium chloride 0.9 % 100 mL IVPB   • VANCOMYCIN - PHARMACIST MONITORED   • hydrALAZINE (APRESOLINE) injection 10 mg   • traMADol (ULTRAM) tablet 50 mg   • vancomycin (VANCOCIN) 1,000 mg in sodium chloride 0.9 % 250 mL IVPB       Allergies:  ALLERGIES:   Allergen Reactions   • Morphine VOMITING   • Codeine Sulfate VOMITING, NAUSEA and DIARRHEA   • Influenza Vac Typ    • Terazosin Other (See Comments)       Family History:  Family History   Problem Relation Age of Onset   • Diabetes Mother         type 2   • Genitourinary Mother         renal failure   • Hypertension Mother    • Genitourinary Father         renal failure   • Injuries Brother         MVA        Social History:  Social History     Social History   • Marital status:      Spouse name: N/A   • Number of children: 6   • Years of education: N/A     Social History Main Topics   • Smoking status: Former Smoker     Packs/day: 0.50     Years: 1.00     Types: Cigarettes     Quit date: 1/1/1945   • Smokeless tobacco: Never Used   • Alcohol use No      Comment: OCC   • Drug use: No   • Sexual activity: Not Currently     Other Topics Concern   • Caffeine Concern  No   • Sleep Concern No   • Stress Concern No   • Weight Concern No   • Special Diet Yes     diabetic   • Seat Belt Yes   • Self-Exams No     Social History Narrative   • None       Review of systems:  A complete review of systems was performed, and aside from what is mentioned in HPI, was negative.       Physical Exam:  Visit Vitals  /57 ([place] [facility] [place] Position: Semi-Coleman's)   Pulse 78   Temp 97.8 °F (36.6 °C) (Oral)   Resp 18   Ht 5' 6\" (1.676 m)   Wt 61.8 kg   SpO2 92%   BMI 21.99 kg/m²     General:   Awake, alert and oriented and in no acute distress.  Head: Normocephalic, without obvious abnormality, atraumatic  Eyes: Sclera non icteric, EOMI (extraocular movements intact).  ENT/Mouth: No ear discharge, mucous membranes moist.  Neck: Supple, no thyromegaly, trachea midline. No bruit bilaterally.  Cardiovascular system: RRR (regular rate and rhythm), normal S1, S2, No murmur. No palpable thrill.  Respiratory: Anterior /posterior: Posterior fields with bibasilar rales. Non labored. Equal expansion bilaterally.  Abdomen: Soft, nontender, BS (bowel sounds) present. No palpable masses.   Psychiatric: Alert, oriented to time, place, and person. Mood and affect appropriate.  Lymphatic: No cervical or supraclavicular lymphadenopathy.  Skin: Warm and dry. Left pectoral pocket with open incision on lateral device border with device exposed.  Neurological: Equal strength to all four extremities.  Extremities: No edema to bilateral lower extremities      WBC (K/mcL)   Date Value   07/08/2018 7.0     HCT (%)   Date Value   07/08/2018 35.1 (L)     HGB (g/dL)   Date Value   07/08/2018 11.4 (L)     PLT   Date Value   07/08/2018 95 K/mcL (L)   10/30/2013 139 K/mcL (L)   03/02/2011 141 K/uL      Sodium (mmol/L)   Date Value   07/08/2018 139     Potassium (mmol/L)   Date Value   07/08/2018 4.3     Chloride (mmol/L)   Date Value   07/08/2018 104     Glucose (mg/dL)   Date Value   07/08/2018 114 (H)     CALCIUM  (mg/dL)   Date Value   2018 8.5   2011 9.6     CO2 (mmol/L)   Date Value   2011 28     Carbon Dioxide (mmol/L)   Date Value   2018 29     BUN (mg/dL)   Date Value   2018 49 (H)     Creatinine (mg/dL)   Date Value   2018 1.38 (H)      No components found for: GFR  No results found for: BNP   No components found for: DIGOXIN      AST/SGOT (Units/L)   Date Value   2017 28     No components found for: ALT  TOTAL BILIRUBIN (mg/dL)   Date Value   2017 0.4      PROTIME (sec)   Date Value   2011 10.6     INR (no units)   Date Value   2011     1.0  INR Therapeutic Range: 2.0 to 3.0 (2.5 to 3.5 recommended for recurrent thrombotic episodes and mechanical prosthetic heart valves.)       No results found for: TSH  No results found for: T4FREE    Telemetry: AV paced at rate of 70 bpm    EC AV paced    [facility]  Conclusions:  1: Left ventricular ejection fraction was normal, estimated in the range of 55 to 60%. The left ventricular wall thickness is moderately increased. Global systolic function was normal.   2: The right ventricular cavity size is normal. The right ventricular systolic function is normal. There is a pacer wire in the right ventricle.   3: The left atrial size is normal. There is no visible interatrial septum shunt.   4: The right atrial size is normal. There is a pacemaker in the right atrium.   5: Normal functioning Herndon Mike S3 bioprosthesis in aortic position with peak AV velocity of 1.97 m/s and mean gradient of 8mm.   6: No perivalvular aortic insufficiency present.   7: There is evidence of mild mitral regurgitation. There is evidence of mild thickening of the mitral valve. Mildly calcified mitral valve.   8: There is evidence of mild tricuspid regurgitation.   9: There is evidence of trace-to-mild pulmonic regurgitation.   10: The visualized portions of the aorta (ascending aorta, aortic root, and aortic arch) are  normal.    Pacemaker interrogation 7/5/18 reveals function to be within normal limits. Measurements were performed via interrogative programming, and found to be within normal limits. Battery status stable. Please see device interrogation for further details.   Presenting Rhythm- ASBVP Underlying Rhythm- Sinus with CHB/BVP to 40  AP 42%  BVP >99%  2 AMS lasting seconds, <1%  0 VHR  LV threshold 2.5@1.0    Impression: 89 year old female with history of complete heart block and pacemaker dependent admitted with exposed BiV pacemaker.     Eroded pacemaker, infected left pacemaker pocket   - s/p [employer] BiV PPM 1/26/17 (Originally STJ BiV ICD 11/2008   - ID and Dr. Galvan consulted.  Complete heart block (original PPM placed 2007, upgraded to BiV ICD '08, BiV PPM '17)   - dependent on interrogation 7/5/18  Severe aortic stenosis s/p TAVR 4/2018   - Echo 5/2018 showing well seated valve. EF 55-60%  Coronary artery disease with history of stents  Cardiomyopathy, Ischemic with recovered EF   - '08 EF 28-32%. BiV device implanted with EF improvement with BiV pacing   - On coreg/diovan  Hypertension  History of paroxysmal atrial fibrillation not on anticoagulation  Thrombocytopenia, platelets 95 today    Recommendations:    Dr. Galvan consulted for explant of device.  Continues on IV antibiotics. Cultures pending. No growth from cultures at [facility].  Will need reimplant on right side prior to discharge as patient is pacemaker dependent.  Seen alongside and discussed with Dr. Mena.    Lolita Saenz, LV  [phone]    I, Chencho Mena MD personally interviewed and examined this patient and formulated the impression and recommendations with the EP nurse practitioner, Lolita Saenz. Her note above was reviewed, confirmed, and amended as needed.  Pacemaker pocket erosion in an elderly dependent patient now with preserved EF. The patient denies any pocket discomfort. She wants to make sure she will get a temporary PPM before  this one is removed. I discussed with her that we will plan a single chamber PPM implant once she is cleared by ID after this PPM system is explanted. Will follow.     For EP questions between 7am-5pm please contact the NP/Fellow listed above. After 5pm and before 7am contact the answering service at [phone] and page on call EP Fellow.     mild spasticity UE

## 2024-11-14 NOTE — H&P PST ADULT - OTHER CARE PROVIDERS
cardio  [phone] unknown, sees a cardiologist / group home staff didn't have any specialist information w/ them

## 2024-11-14 NOTE — H&P PST ADULT - PROBLEM SELECTOR PLAN 1
comprehensive dental treatment under anesthesia comprehensive dental treatment under anesthesia  [facility] labs send  preprocedure instructions discussed with group home staff   hx of chornic hyponatremia: continue Salt tabs as per MAR

## 2024-11-14 NOTE — H&P PST ADULT - ENMT COMMENTS
Received response and has been scanned into [employer]   H/o dysphagia; eats regular food at present, takes meds with water H/o dysphagia; eats regular food, takes meds with water

## 2024-11-14 NOTE — H&P PST ADULT - HISTORY OF PRESENT ILLNESS
33 y/o male with PMH of Seizure Disorder -  medically managed, no seizure activity in years,  Cerebral Palsy, Autism, Mental Retardation (simple verbalizations, co-operative with care, ambulatory, self mutilating behavior - bangs head, wears helmet as safety precaution), dental caries scheduled for Comprehensive Dental Treatment under anesthesia on 11/29/24 w/ Dr. earl     Last dental treatment was 4/2022 in [facility]     33 y/o male came with 2 group home staff with PMH of Seizure Disorder -  medically managed, no seizure activity in years, Autism, Mental Retardation (simple verbalizations, co-operative with care, ambulatory, self mutilating behavior - bangs head, wears helmet as safety precaution), dental caries scheduled for Comprehensive Dental Treatment under anesthesia on 11/29/24 w/ Dr. earl     Last dental treatment was 4/2022 in [facility]  HCP Zaynab Garcia is [facility] [phone] mother for consents      33 y/o male came with 2 group home staff with PMH of Seizure Disorder -  medically managed, no seizure activity in years, Autism, Mental Retardation (simple verbalizations, co-operative with care, ambulatory, self mutilating behavior - bangs head, wears helmet as safety precaution), chronic hyponatremia on sodium tabs w/ dental caries scheduled for Comprehensive Dental Treatment under anesthesia on 11/29/24 w/ Dr. earl     Last dental treatment was 4/2022 in [facility]  HCP Zaynab Garcia is [facility] [phone] mother for consents

## 2024-11-14 NOTE — H&P PST ADULT - WEIGHT IN LBS
----- Message from Mildred Holliday MD sent at 9/24/2019 10:30 PM CDT -----  Okay. Thanks  ----- Message -----  From: Adarsh Christopher MD  Sent: 9/24/2019   3:58 PM CDT  To: Mildred Holliday MD, Mercedes Hebert RN    Looks like a good response.  Let me get her in. She may end up with riverdarek if she doesn't want to cross the river.  i'll have emile call her.    emile--if she wants me to see her I can see her Monday.  I guess I can do her operation randomly or give Riverdarek the option.  i'll touch base with Patricia.    adarsh        ----- Message -----  From: Mildred Holliday MD  Sent: 9/19/2019   3:25 PM CDT  To: Adarsh Christopher MD    Looks like she has had a response on recent PET. Let me know your thoughts. -SJ      
Spoke with patient regarding F/U appt, appt scheduled and confirmed, all questions answered at this time, pt states she has had increased SOB but SpO2 remains above 90%, pt to F/U with lung doctor at [facility] tomorrow   
151

## 2024-11-14 NOTE — H&P PST ADULT - NSICDXPASTMEDICALHX_GEN_ALL_CORE_FT
PAST MEDICAL HISTORY:  Autism     Constipation     Dysphagia     Enuresis     GERD (gastroesophageal reflux disease)     H/O cerebral palsy     Hyperlipemia     Hyponatremia     Hypothyroid     Lead poisoning     Low HDL (under 40)     Lyme disease     Mental retardation severe    Pervasive developmental disorder, active with self injurous behavior    Pneumonia due to COVID-19 virus Right lung, 03/2020, hospitalized for 2 weeks, no intubation    Seizure disorder - on meds, no seizures for several years     PAST MEDICAL HISTORY:  Autism     Chronic hyponatremia     Community acquired pneumonia     Constipation     Elbow fracture     Enuresis     GERD (gastroesophageal reflux disease)     H/O sinus bradycardia     History of dysphagia     Hyperlipemia     Hyponatremia     Hypothyroid     Lactose intolerance     Lead poisoning     Low HDL (under 40)     Lyme disease     Mental retardation severe    PAC (premature atrial contraction)     Pervasive developmental disorder, active with self injurous behavior    Pneumonia due to COVID-19 virus Right lung, 03/2020, hospitalized for 2 weeks, no intubation    Seizure disorder - on meds, no seizures for several years

## 2024-11-14 NOTE — H&P PST ADULT - ASSESSMENT
DASI score:  DASIactivity: walking without assistive device   loose teeth or dentures: pending      DASI score:4  DASIactivity: walking without assistive device   loose teeth or dentures: pending

## 2024-11-14 NOTE — H&P PST ADULT - DOES PATIENT HAVE ADVANCE DIRECTIVE
**INCOMPLETE**  R3 Postpartum Note, POD#7 HD#8    Interval events: Patient seen and examined at bedside, pt with continuation of loose stool & acid reflux overnight - given pepcid & loperamide. No acute complaints currently, continues to tolerate regular diet. Pt denies CP, SOB, N/V, fevers, or chills.    Vital Signs Last 24 Hours  T(C): 36.7 (05-16-22 @ 00:21), Max: 37.2 (05-15-22 @ 20:16)  HR: 92 (05-16-22 @ 00:21) (91 - 99)  BP: 125/81 (05-16-22 @ 00:21) (111/78 - 133/82)  RR: 18 (05-16-22 @ 00:21) (18 - 18)  SpO2: 96% (05-16-22 @ 00:21) (96% - 99%)    Physical Exam:  Constitutional: NAD, A+O x3  CV: RRR  Lungs: breathing comfortably on RA  Abdomen: Soft, nondistended, no guarding or rebound tenderness. Normal bowel sounds  Incision: Midline vertical incision c/d/i with staples  : No bleeding on pad  Extremities: No lower extremity edema or calf tenderness bilaterally; SCDs in place      Labs:             10.3   8.89  )-----------( 286      ( 05-15 @ 11:49 )             30.5     05-15 @ 11:49    139  |  106  |  12  ----------------------------<  95  3.5   |  21  |  0.46    Ca    8.5      05-15 @ 11:49  Phos  3.9     05-15 @ 11:49  Mg     1.8     05-15 @ 11:49    TPro  5.1  /  Alb  2.0  /  TBili  0.3  /  DBili  x   /  AST  13  /  ALT  8   /  AlkPhos  122  05-14 @ 07:04        MEDICATIONS  (STANDING):  acetaminophen     Tablet .. 975 milliGRAM(s) Oral every 6 hours  ciprofloxacin     Tablet 500 milliGRAM(s) Oral every 12 hours  diphtheria/tetanus/pertussis (acellular) Vaccine (ADAcel) 0.5 milliLiter(s) IntraMuscular once  famotidine Injectable 20 milliGRAM(s) IV Push once  heparin   Injectable 24962 Unit(s) SubCutaneous every 12 hours  ibuprofen  Tablet. 600 milliGRAM(s) Oral every 6 hours  lactobacillus acidophilus 1 Tablet(s) Oral daily  nystatin/triamcinolone Cream 1 Application(s) Topical two times a day  sodium chloride 0.9% lock flush 3 milliLiter(s) IV Push every 8 hours    MEDICATIONS  (PRN):  lanolin Ointment 1 Application(s) Topical every 6 hours PRN Sore Nipples  LORazepam   Injectable 1 milliGRAM(s) IV Push every 6 hours PRN Anxiety  morphine  - Injectable 2 milliGRAM(s) IV Push every 4 hours PRN Moderate Pain (4 - 6)  ondansetron Injectable 4 milliGRAM(s) IV Push every 8 hours PRN Nausea and/or Vomiting   R3 Postpartum Note, POD#7 HD#8    Interval events: Patient seen and examined at bedside, pt with continuation of loose stool & acid reflux overnight - given pepcid & loperamide & reports improvement of sx. CDif (-).  No acute complaints currently, continues to tolerate regular diet. Pt denies CP, SOB, N/V, fevers, or chills.    Vital Signs Last 24 Hours  T(C): 36.7 (05-16-22 @ 00:21), Max: 37.2 (05-15-22 @ 20:16)  HR: 92 (05-16-22 @ 00:21) (91 - 99)  BP: 125/81 (05-16-22 @ 00:21) (111/78 - 133/82)  RR: 18 (05-16-22 @ 00:21) (18 - 18)  SpO2: 96% (05-16-22 @ 00:21) (96% - 99%)    Physical Exam:  Constitutional: NAD, A+O x3  CV: RRR  Lungs: breathing comfortably on RA  Abdomen: Soft, obese nondistended, no guarding or rebound tenderness. Normal bowel sounds  Incision: Midline vertical incision c/d/i with matt  : Mons, labia, and inner thighs moist & with diffuse erythematous rash likely fungal - copious Nystatin cream applied.  Chucks/pad over area to maintain dry. No bleeding on pad, minimal lochia  Extremities: 3+ BLE edema up to thighs      Labs:             10.3   8.89  )-----------( 286      ( 05-15 @ 11:49 )             30.5     05-15 @ 11:49    139  |  106  |  12  ----------------------------<  95  3.5   |  21  |  0.46    Ca    8.5      05-15 @ 11:49  Phos  3.9     05-15 @ 11:49  Mg     1.8     05-15 @ 11:49    TPro  5.1  /  Alb  2.0  /  TBili  0.3  /  DBili  x   /  AST  13  /  ALT  8   /  AlkPhos  122  05-14 @ 07:04        MEDICATIONS  (STANDING):  acetaminophen     Tablet .. 975 milliGRAM(s) Oral every 6 hours  ciprofloxacin     Tablet 500 milliGRAM(s) Oral every 12 hours  diphtheria/tetanus/pertussis (acellular) Vaccine (ADAcel) 0.5 milliLiter(s) IntraMuscular once  famotidine Injectable 20 milliGRAM(s) IV Push once  heparin   Injectable 18142 Unit(s) SubCutaneous every 12 hours  ibuprofen  Tablet. 600 milliGRAM(s) Oral every 6 hours  lactobacillus acidophilus 1 Tablet(s) Oral daily  nystatin/triamcinolone Cream 1 Application(s) Topical two times a day  sodium chloride 0.9% lock flush 3 milliLiter(s) IV Push every 8 hours    MEDICATIONS  (PRN):  lanolin Ointment 1 Application(s) Topical every 6 hours PRN Sore Nipples  LORazepam   Injectable 1 milliGRAM(s) IV Push every 6 hours PRN Anxiety  morphine  - Injectable 2 milliGRAM(s) IV Push every 4 hours PRN Moderate Pain (4 - 6)  ondansetron Injectable 4 milliGRAM(s) IV Push every 8 hours PRN Nausea and/or Vomiting   mother Zaynab Garcia is [facility] [phone]/Yes Yes

## 2024-11-14 NOTE — H&P PST ADULT - PROBLEM SELECTOR PLAN 2
seizure precautions seizure precautions  continue seizure meds am of procedure with small sips of water

## 2024-11-29 ENCOUNTER — OUTPATIENT (OUTPATIENT)
Dept: OUTPATIENT SERVICES | Facility: HOSPITAL | Age: 34
LOS: 1 days | End: 2024-11-29
Payer: MEDICAID

## 2024-11-29 ENCOUNTER — TRANSCRIPTION ENCOUNTER (OUTPATIENT)
Age: 34
End: 2024-11-29

## 2024-11-29 VITALS
HEART RATE: 67 BPM | SYSTOLIC BLOOD PRESSURE: 140 MMHG | DIASTOLIC BLOOD PRESSURE: 78 MMHG | RESPIRATION RATE: 15 BRPM | OXYGEN SATURATION: 95 %

## 2024-11-29 VITALS
HEART RATE: 76 BPM | RESPIRATION RATE: 16 BRPM | WEIGHT: 151.02 LBS | HEIGHT: 60.5 IN | SYSTOLIC BLOOD PRESSURE: 139 MMHG | TEMPERATURE: 97 F | DIASTOLIC BLOOD PRESSURE: 85 MMHG | OXYGEN SATURATION: 98 %

## 2024-11-29 DIAGNOSIS — K02.62 DENTAL CARIES ON SMOOTH SURFACE PENETRATING INTO DENTIN: ICD-10-CM

## 2024-11-29 DIAGNOSIS — Z98.811 DENTAL RESTORATION STATUS: Chronic | ICD-10-CM

## 2024-11-29 DIAGNOSIS — K05.6 PERIODONTAL DISEASE, UNSPECIFIED: ICD-10-CM

## 2024-11-29 LAB — SODIUM SERPL-SCNC: 133 MMOL/L — LOW (ref 135–145)

## 2024-11-29 PROCEDURE — C9399: CPT

## 2024-11-29 PROCEDURE — D2394: CPT

## 2024-11-29 PROCEDURE — 84295 ASSAY OF SERUM SODIUM: CPT

## 2024-11-29 PROCEDURE — D1110: CPT

## 2024-11-29 PROCEDURE — D1206: CPT

## 2024-11-29 PROCEDURE — D2393: CPT

## 2024-11-29 PROCEDURE — D2392: CPT

## 2024-11-29 RX ORDER — SODIUM CHLORIDE 9 MG/ML
1 INJECTION, SOLUTION INTRAMUSCULAR; INTRAVENOUS; SUBCUTANEOUS
Refills: 0 | DISCHARGE

## 2024-11-29 RX ORDER — ONDANSETRON HYDROCHLORIDE 4 MG/1
4 TABLET, FILM COATED ORAL ONCE
Refills: 0 | Status: ACTIVE | OUTPATIENT
Start: 2024-11-29 | End: 2025-10-28

## 2024-11-29 RX ORDER — DIVALPROEX SODIUM 250 MG/1
2 TABLET, FILM COATED, EXTENDED RELEASE ORAL
Refills: 0 | DISCHARGE

## 2024-11-29 RX ORDER — OXYBUTYNIN CHLORIDE 5 MG/1
1 TABLET ORAL
Refills: 0 | DISCHARGE

## 2024-11-29 RX ORDER — LIDOCAINE HCL 20 MG/ML
0.2 VIAL (ML) INJECTION ONCE
Refills: 0 | Status: ACTIVE | OUTPATIENT
Start: 2024-11-29

## 2024-11-29 RX ORDER — LACTULOSE 10 G/15 ML
1 SOLUTION, ORAL ORAL
Refills: 0 | DISCHARGE

## 2024-11-29 RX ORDER — PSYLLIUM SEED
2 POWDER (GRAM) ORAL
Refills: 0 | DISCHARGE

## 2024-11-29 RX ORDER — SODIUM CHLORIDE 9 MG/ML
3 INJECTION, SOLUTION INTRAMUSCULAR; INTRAVENOUS; SUBCUTANEOUS EVERY 8 HOURS
Refills: 0 | Status: ACTIVE | OUTPATIENT
Start: 2024-11-29 | End: 2025-10-28

## 2024-11-29 NOTE — ASU DISCHARGE PLAN (ADULT/PEDIATRIC) - FINANCIAL ASSISTANCE
[facility] provides services at a reduced cost to those who are determined to be eligible through [facility]’s financial assistance program. Information regarding [facility]’s financial assistance program can be found by going to [email or web address] or by calling [phone].

## 2024-11-29 NOTE — ASU DISCHARGE PLAN (ADULT/PEDIATRIC) - ASU DC SPECIAL INSTRUCTIONSFT
comprehensive dental treatment under general anesthesia, exam, xcleaning, restorations and fluoride    tylenol prn pain and give him tylenol for the next two days for comfort     see DR Day in one week, appointment will be at [facility] [phone] on Tuesday 12/3 at 2 pm   resume all medications    return to routine activities tomorrow if patient feels well comprehensive dental treatment under general anesthesia, exam, xcleaning, restorations and fluoride    ********************************************************************************************   tylenol prn pain and give him tylenol for the next two days for comfort     ********************************************************************************************   see DR Day in one week, appointment will be at [facility] [phone] on Tuesday 12/3 at 2 pm   resume all medications    ********************************************************************************************   return to routine activities tomorrow if patient feels well

## 2024-11-29 NOTE — ASU DISCHARGE PLAN (ADULT/PEDIATRIC) - NS MD DC FALL RISK RISK
For information on Fall & Injury Prevention, visit: [email or web address] OR  [email or web address] OR  https://www.cdc.gov/steadi/patient.html

## 2025-02-07 NOTE — ED PROVIDER NOTE - NSCAREINITIATED _GEN_ER
Madhav Meng(Resident) amiodarone 200 mg oral tablet: 0.5 tab(s) orally once a day  aspirin 81 mg oral delayed release tablet: 1 tab(s) orally once a day  cholecalciferol 125 mcg (5000 intl units) oral tablet: 1 tab(s) orally once a day  Eliquis 2.5 mg oral tablet: 1 tab(s) orally 2 times a day  Furosemide 20 MG Oral Tablet:   levothyroxine 50 mcg (0.05 mg) oral tablet: 1 tab(s) orally once a day  metFORMIN 500 mg oral tablet: 1 tab(s) orally once a day  metoprolol succinate 50 mg oral tablet, extended release: 1 tab(s) orally once a day  potassium chloride 20 mEq oral tablet, extended release: 1 tab(s) orally once a day  rosuvastatin 5 mg oral tablet: 1 tab(s) orally once a day (at bedtime)  telmisartan 20 mg oral tablet: 1 tab(s) orally once a day   amiodarone 200 mg oral tablet: 0.5 tab(s) orally once a day  aspirin 81 mg oral delayed release tablet: 1 tab(s) orally once a day  bumetanide 1 mg oral tablet: 1 tab(s) orally once a day  cholecalciferol 125 mcg (5000 intl units) oral tablet: 1 tab(s) orally once a day  dapagliflozin 10 mg oral tablet: 1 tab(s) orally every 24 hours  Eliquis 2.5 mg oral tablet: 1 tab(s) orally 2 times a day  levothyroxine 50 mcg (0.05 mg) oral tablet: 1 tab(s) orally once a day  metFORMIN 500 mg oral tablet: 1 tab(s) orally once a day  metoprolol succinate 50 mg oral tablet, extended release: 1 tab(s) orally once a day  rosuvastatin 5 mg oral tablet: 1 tab(s) orally once a day (at bedtime)  spironolactone 25 mg oral tablet: 1 tab(s) orally once a day  telmisartan 20 mg oral tablet: 1 tab(s) orally once a day   amiodarone 200 mg oral tablet: 0.5 tab(s) orally once a day  aspirin 81 mg oral delayed release tablet: 1 tab(s) orally once a day  bumetanide 1 mg oral tablet: 1 tab(s) orally once a day  cholecalciferol 125 mcg (5000 intl units) oral tablet: 1 tab(s) orally once a day  dapagliflozin 10 mg oral tablet: 1 tab(s) orally every 24 hours  Eliquis 2.5 mg oral tablet: 1 tab(s) orally 2 times a day  levothyroxine 50 mcg (0.05 mg) oral tablet: 1 tab(s) orally once a day  metFORMIN 500 mg oral tablet: 1 tab(s) orally once a day  metoprolol succinate 50 mg oral tablet, extended release: 1 tab(s) orally once a day  ondansetron 4 mg oral tablet: 1 tab(s) orally 2 times a day as needed for  nausea  Protonix 40 mg oral delayed release tablet: 1 tab(s) orally once a day  rosuvastatin 5 mg oral tablet: 1 tab(s) orally once a day (at bedtime)  spironolactone 25 mg oral tablet: 1 tab(s) orally once a day  telmisartan 20 mg oral tablet: 1 tab(s) orally once a day

## 2025-02-25 ENCOUNTER — EMERGENCY (EMERGENCY)
Facility: HOSPITAL | Age: 35
LOS: 1 days | Discharge: HOME | End: 2025-02-25
Attending: EMERGENCY MEDICINE
Payer: MEDICAID

## 2025-02-25 VITALS
DIASTOLIC BLOOD PRESSURE: 104 MMHG | OXYGEN SATURATION: 99 % | RESPIRATION RATE: 20 BRPM | TEMPERATURE: 98 F | HEART RATE: 76 BPM | SYSTOLIC BLOOD PRESSURE: 137 MMHG | HEIGHT: 67 IN | WEIGHT: 160.06 LBS

## 2025-02-25 VITALS
OXYGEN SATURATION: 100 % | DIASTOLIC BLOOD PRESSURE: 96 MMHG | RESPIRATION RATE: 17 BRPM | TEMPERATURE: 98 F | SYSTOLIC BLOOD PRESSURE: 137 MMHG | HEART RATE: 84 BPM

## 2025-02-25 DIAGNOSIS — Z98.811 DENTAL RESTORATION STATUS: Chronic | ICD-10-CM

## 2025-02-25 PROBLEM — E73.9 LACTOSE INTOLERANCE, UNSPECIFIED: Chronic | Status: ACTIVE | Noted: 2024-11-14

## 2025-02-25 PROBLEM — Z87.898 PERSONAL HISTORY OF OTHER SPECIFIED CONDITIONS: Chronic | Status: ACTIVE | Noted: 2024-11-14

## 2025-02-25 PROBLEM — I49.1 ATRIAL PREMATURE DEPOLARIZATION: Chronic | Status: ACTIVE | Noted: 2024-11-14

## 2025-02-25 PROBLEM — E87.1 HYPO-OSMOLALITY AND HYPONATREMIA: Chronic | Status: ACTIVE | Noted: 2024-11-14

## 2025-02-25 PROBLEM — Z86.79 PERSONAL HISTORY OF OTHER DISEASES OF THE CIRCULATORY SYSTEM: Chronic | Status: ACTIVE | Noted: 2024-11-14

## 2025-02-25 PROBLEM — J18.9 PNEUMONIA, UNSPECIFIED ORGANISM: Chronic | Status: ACTIVE | Noted: 2024-11-14

## 2025-02-25 PROBLEM — S42.409A UNSPECIFIED FRACTURE OF LOWER END OF UNSPECIFIED HUMERUS, INITIAL ENCOUNTER FOR CLOSED FRACTURE: Chronic | Status: ACTIVE | Noted: 2024-11-14

## 2025-02-25 LAB
FLUAV AG NPH QL: SIGNIFICANT CHANGE UP
FLUBV AG NPH QL: SIGNIFICANT CHANGE UP
RSV RNA NPH QL NAA+NON-PROBE: SIGNIFICANT CHANGE UP
SARS-COV-2 RNA SPEC QL NAA+PROBE: SIGNIFICANT CHANGE UP

## 2025-02-25 PROCEDURE — 99284 EMERGENCY DEPT VISIT MOD MDM: CPT | Mod: 25

## 2025-02-25 PROCEDURE — 99284 EMERGENCY DEPT VISIT MOD MDM: CPT

## 2025-02-25 PROCEDURE — 0241U: CPT

## 2025-02-25 PROCEDURE — 72125 CT NECK SPINE W/O DYE: CPT | Mod: MC

## 2025-02-25 PROCEDURE — 70450 CT HEAD/BRAIN W/O DYE: CPT | Mod: MC

## 2025-02-25 PROCEDURE — 87637 SARSCOV2&INF A&B&RSV AMP PRB: CPT

## 2025-02-25 PROCEDURE — 72125 CT NECK SPINE W/O DYE: CPT | Mod: 26

## 2025-02-25 PROCEDURE — 70450 CT HEAD/BRAIN W/O DYE: CPT | Mod: 26

## 2025-02-25 RX ADMIN — Medication 2 MILLIGRAM(S): at 12:36

## 2025-02-25 NOTE — ED ADULT NURSE REASSESSMENT NOTE - NS ED NURSE REASSESS COMMENT FT1
Patient back from CT scan, tolerated procedure well. Pending CT results. Bed locked and lowered. Comfort and safety measures maintained.

## 2025-02-25 NOTE — ED PROVIDER NOTE - CLINICAL SUMMARY MEDICAL DECISION MAKING FREE TEXT BOX
Monico RAE: Patient is a 35-year-old male with a history of developmental delay, behavioral disturbance, autism here from group [place] for evaluation of hematoma to right scalp.  Per group home aids/staff at bedside, patient became agitated while taking a shower this morning and continuously banged his head against the wall.  He frequently has this behavior and usually wears a helmet but was not doing so because he was in the shower.  He did not pass out or have any subsequent vomiting.  Patient is acting at his baseline. He does not take blood thinners.  He is walking normally.  He was sent in from his group home for medical clearance.  on exam, patient has a hematoma to his right parietal scalp.  He otherwise is behaving at his baseline, has no neurodeficit.  Per staff, patient does not usually require sedation for CT scan.  Will get CT head and CT C-spine.

## 2025-02-25 NOTE — ED ADULT NURSE NOTE - OBJECTIVE STATEMENT
35 year old male presents to ed via walk in accompanied by aide complaining of head injury. PMH MR and seizures. Per aide patient was in shower this morning and became upset so hit head against the wall. No LOC, no blood thinners. Patient acting normal self per aides since incident. Upon assessment patient able to state name and is well appearing. patient ambulatory with steady gait and placed into patient gown. No bruising/bleeding noted on exam. Pt not complaining of any pain. To be seen by MD. Bed locked and lowered. Comfort and safety measures maintained.

## 2025-02-25 NOTE — ED PROVIDER NOTE - NSICDXPASTMEDICALHX_GEN_ALL_CORE_FT
PAST MEDICAL HISTORY:  Autism     Chronic hyponatremia     Community acquired pneumonia     Constipation     Elbow fracture     Enuresis     GERD (gastroesophageal reflux disease)     H/O sinus bradycardia     History of dysphagia     Hyperlipemia     Hyponatremia     Hypothyroid     Lactose intolerance     Lead poisoning     Low HDL (under 40)     Lyme disease     Mental retardation severe    PAC (premature atrial contraction)     Pervasive developmental disorder, active with self injurous behavior    Pneumonia due to COVID-19 virus Right lung, 03/2020, hospitalized for 2 weeks, no intubation    Seizure disorder - on meds, no seizures for several years

## 2025-02-25 NOTE — ED PROVIDER NOTE - PROGRESS NOTE DETAILS
Patient is not laying still in CT Scan. PO ativan ordered. Patient is back to his baseline. CT's negative.

## 2025-02-25 NOTE — ED PROVIDER NOTE - OBJECTIVE STATEMENT
Monico RAE: Patient is a 35-year-old male with a history of developmental delay, behavioral disturbance, autism here from group [place] for evaluation of hematoma to right scalp.  Per group home aids/staff at bedside, patient became agitated while taking a shower this morning and continuously banged his head against the wall.  He frequently has this behavior and usually wears a helmet but was not doing so because he was in the shower.  He did not pass out or have any subsequent vomiting.  Patient is acting at his baseline. He does not take blood thinners.  He is walking normally.  He was sent in from his group home for medical clearance.

## 2025-02-25 NOTE — ED PROVIDER NOTE - PATIENT PORTAL LINK FT
You can access the FollowMyHealth Patient Portal offered by [facility] by registering at the following website: [email or web address]. By joining [facility]’s FollowMyHealth portal, you will also be able to view your health information using other applications (apps) compatible with our system.

## 2025-02-25 NOTE — ED PROVIDER NOTE - PHYSICAL EXAMINATION
VS noted  Gen. no acute distress, Non toxic   HEENT: EOMI, mmm, hematoma to right parietal scalp  Spine: no midline C-T-L spine tenderness  Lungs: CTAB/L no C/ W /R   CVS: RRR   Abd; Soft non tender, non distended   Ext: no edema  Skin: no rash  Neuro Awake. alert, BERG, follows commands, face symmetrical, normal gait, speaking at baseline

## 2025-02-25 NOTE — ED ADULT TRIAGE NOTE - CHIEF COMPLAINT QUOTE
group home patient, patient hit his head against the wall while taking a shower 2-3 times, no LOC  PMH aggression

## 2025-02-25 NOTE — ED PROVIDER NOTE - NSFOLLOWUPINSTRUCTIONS_ED_ALL_ED_FT
You were seen here today for head injury.  You had a CT head and CT C-spine.  These results are included in your discharge paperwork.  Your CT head shows that you have a scalp hematoma.  There is no internal injury.  Your CT spine has a number of degenerative changes that are common with age.  Please follow-up with your primary care physician. You were seen here today for head injury.  You had a CT head and CT C-spine.  These results are included in your discharge paperwork.  Your CT head shows that you have a scalp hematoma.  There is no internal injury.  Your CT spine has a number of degenerative changes that are common with age.  Please follow-up with your primary care physician.    A referral to [facility] has been placed for routine follow up. You will receive a call for an appointment.

## 2025-02-25 NOTE — ED ADULT NURSE REASSESSMENT NOTE - NS ED NURSE REASSESS COMMENT FT1
Patient attempt to get ct scan, patient unable to sit still to complete test. Brought back to room and provided with PO meds for agitation. Aides at bedside. Bed locked and lowered. Comfort and safety measures maintained.

## 2025-05-02 ENCOUNTER — INPATIENT (INPATIENT)
Facility: HOSPITAL | Age: 35
LOS: 3 days | Discharge: HOME | End: 2025-05-06
Attending: INTERNAL MEDICINE | Admitting: INTERNAL MEDICINE
Payer: MEDICAID

## 2025-05-02 VITALS
RESPIRATION RATE: 16 BRPM | WEIGHT: 139.99 LBS | SYSTOLIC BLOOD PRESSURE: 161 MMHG | HEIGHT: 65 IN | OXYGEN SATURATION: 98 % | HEART RATE: 76 BPM | DIASTOLIC BLOOD PRESSURE: 94 MMHG

## 2025-05-02 DIAGNOSIS — Z98.811 DENTAL RESTORATION STATUS: Chronic | ICD-10-CM

## 2025-05-02 DIAGNOSIS — E87.1 HYPO-OSMOLALITY AND HYPONATREMIA: ICD-10-CM

## 2025-05-02 LAB
ALBUMIN SERPL ELPH-MCNC: 4.4 G/DL — SIGNIFICANT CHANGE UP (ref 3.3–5)
ALP SERPL-CCNC: 111 U/L — SIGNIFICANT CHANGE UP (ref 40–120)
ALT FLD-CCNC: 13 U/L — SIGNIFICANT CHANGE UP (ref 4–41)
ANION GAP SERPL CALC-SCNC: 14 MMOL/L — SIGNIFICANT CHANGE UP (ref 7–14)
APPEARANCE UR: CLEAR — SIGNIFICANT CHANGE UP
AST SERPL-CCNC: 27 U/L — SIGNIFICANT CHANGE UP (ref 4–40)
BASOPHILS # BLD AUTO: 0.06 K/UL — SIGNIFICANT CHANGE UP (ref 0–0.2)
BASOPHILS NFR BLD AUTO: 0.4 % — SIGNIFICANT CHANGE UP (ref 0–2)
BILIRUB SERPL-MCNC: 0.2 MG/DL — SIGNIFICANT CHANGE UP (ref 0.2–1.2)
BILIRUB UR-MCNC: NEGATIVE — SIGNIFICANT CHANGE UP
BUN SERPL-MCNC: 9 MG/DL — SIGNIFICANT CHANGE UP (ref 7–23)
CALCIUM SERPL-MCNC: 8.9 MG/DL — SIGNIFICANT CHANGE UP (ref 8.4–10.5)
CHLORIDE SERPL-SCNC: 85 MMOL/L — LOW (ref 98–107)
CO2 SERPL-SCNC: 19 MMOL/L — LOW (ref 22–31)
COLOR SPEC: YELLOW — SIGNIFICANT CHANGE UP
CREAT SERPL-MCNC: 0.43 MG/DL — LOW (ref 0.5–1.3)
DIFF PNL FLD: NEGATIVE — SIGNIFICANT CHANGE UP
EGFR: 143 ML/MIN/1.73M2 — SIGNIFICANT CHANGE UP
EGFR: 143 ML/MIN/1.73M2 — SIGNIFICANT CHANGE UP
EOSINOPHIL # BLD AUTO: 0 K/UL — SIGNIFICANT CHANGE UP (ref 0–0.5)
EOSINOPHIL NFR BLD AUTO: 0 % — SIGNIFICANT CHANGE UP (ref 0–6)
GLUCOSE SERPL-MCNC: 220 MG/DL — HIGH (ref 70–99)
GLUCOSE UR QL: 500 MG/DL
HCT VFR BLD CALC: 39.4 % — SIGNIFICANT CHANGE UP (ref 39–50)
HGB BLD-MCNC: 14 G/DL — SIGNIFICANT CHANGE UP (ref 13–17)
IANC: 11.55 K/UL — HIGH (ref 1.8–7.4)
IMM GRANULOCYTES NFR BLD AUTO: 1.4 % — HIGH (ref 0–0.9)
KETONES UR-MCNC: 15 MG/DL
LEUKOCYTE ESTERASE UR-ACNC: NEGATIVE — SIGNIFICANT CHANGE UP
LYMPHOCYTES # BLD AUTO: 1.09 K/UL — SIGNIFICANT CHANGE UP (ref 1–3.3)
LYMPHOCYTES # BLD AUTO: 7.9 % — LOW (ref 13–44)
MAGNESIUM SERPL-MCNC: 1.6 MG/DL — SIGNIFICANT CHANGE UP (ref 1.6–2.6)
MCHC RBC-ENTMCNC: 31.7 PG — SIGNIFICANT CHANGE UP (ref 27–34)
MCHC RBC-ENTMCNC: 35.5 G/DL — SIGNIFICANT CHANGE UP (ref 32–36)
MCV RBC AUTO: 89.1 FL — SIGNIFICANT CHANGE UP (ref 80–100)
MONOCYTES # BLD AUTO: 0.92 K/UL — HIGH (ref 0–0.9)
MONOCYTES NFR BLD AUTO: 6.7 % — SIGNIFICANT CHANGE UP (ref 2–14)
NEUTROPHILS # BLD AUTO: 11.55 K/UL — HIGH (ref 1.8–7.4)
NEUTROPHILS NFR BLD AUTO: 83.6 % — HIGH (ref 43–77)
NITRITE UR-MCNC: NEGATIVE — SIGNIFICANT CHANGE UP
NRBC # BLD AUTO: 0 K/UL — SIGNIFICANT CHANGE UP (ref 0–0)
NRBC # FLD: 0 K/UL — SIGNIFICANT CHANGE UP (ref 0–0)
NRBC BLD AUTO-RTO: 0 /100 WBCS — SIGNIFICANT CHANGE UP (ref 0–0)
OSMOLALITY UR: 632 MOSM/KG — SIGNIFICANT CHANGE UP (ref 50–1200)
PH UR: 7.5 — SIGNIFICANT CHANGE UP (ref 5–8)
PHOSPHATE SERPL-MCNC: 3 MG/DL — SIGNIFICANT CHANGE UP (ref 2.5–4.5)
PLATELET # BLD AUTO: 263 K/UL — SIGNIFICANT CHANGE UP (ref 150–400)
POTASSIUM SERPL-MCNC: 5 MMOL/L — SIGNIFICANT CHANGE UP (ref 3.5–5.3)
POTASSIUM SERPL-SCNC: 5 MMOL/L — SIGNIFICANT CHANGE UP (ref 3.5–5.3)
PROT SERPL-MCNC: 7.8 G/DL — SIGNIFICANT CHANGE UP (ref 6–8.3)
PROT UR-MCNC: 30 MG/DL
RBC # BLD: 4.42 M/UL — SIGNIFICANT CHANGE UP (ref 4.2–5.8)
RBC # FLD: 11.9 % — SIGNIFICANT CHANGE UP (ref 10.3–14.5)
SODIUM SERPL-SCNC: 118 MMOL/L — CRITICAL LOW (ref 135–145)
SP GR SPEC: 1.02 — SIGNIFICANT CHANGE UP (ref 1–1.03)
UROBILINOGEN FLD QL: 1 MG/DL — SIGNIFICANT CHANGE UP (ref 0.2–1)
WBC # BLD: 13.81 K/UL — HIGH (ref 3.8–10.5)
WBC # FLD AUTO: 13.81 K/UL — HIGH (ref 3.8–10.5)

## 2025-05-02 PROCEDURE — 70450 CT HEAD/BRAIN W/O DYE: CPT | Mod: 26

## 2025-05-02 PROCEDURE — 74176 CT ABD & PELVIS W/O CONTRAST: CPT | Mod: 26

## 2025-05-02 PROCEDURE — 99291 CRITICAL CARE FIRST HOUR: CPT

## 2025-05-02 RX ORDER — ONDANSETRON HCL/PF 4 MG/2 ML
4 VIAL (ML) INJECTION ONCE
Refills: 0 | Status: DISCONTINUED | OUTPATIENT
Start: 2025-05-02 | End: 2025-05-02

## 2025-05-02 RX ORDER — ONDANSETRON HCL/PF 4 MG/2 ML
4 VIAL (ML) INJECTION ONCE
Refills: 0 | Status: COMPLETED | OUTPATIENT
Start: 2025-05-02 | End: 2025-05-02

## 2025-05-02 RX ORDER — SODIUM CHLORIDE 3 G/100ML
100 INJECTION, SOLUTION INTRAVENOUS ONCE
Refills: 0 | Status: COMPLETED | OUTPATIENT
Start: 2025-05-02 | End: 2025-05-02

## 2025-05-02 RX ORDER — LORAZEPAM 4 MG/ML
1 VIAL (ML) INJECTION ONCE
Refills: 0 | Status: DISCONTINUED | OUTPATIENT
Start: 2025-05-02 | End: 2025-05-02

## 2025-05-02 RX ORDER — LORAZEPAM 4 MG/ML
2 VIAL (ML) INJECTION ONCE
Refills: 0 | Status: DISCONTINUED | OUTPATIENT
Start: 2025-05-02 | End: 2025-05-02

## 2025-05-02 RX ADMIN — SODIUM CHLORIDE 200 MILLILITER(S): 3 INJECTION, SOLUTION INTRAVENOUS at 21:46

## 2025-05-02 RX ADMIN — Medication 4 MILLIGRAM(S): at 18:59

## 2025-05-02 RX ADMIN — Medication 2 MILLIGRAM(S): at 15:48

## 2025-05-02 NOTE — ED PROVIDER NOTE - OBJECTIVE STATEMENT
Mr. Terrell Garcia is a 35-year-old male with PMH of Autism c/b developmental delay, severe MR, hypothyroidism, seizures, and chronic hyponatremia presenting for acute onset of vomiting around noon today. Patient is minimally verbal at baseline, also endorsed dizziness.

## 2025-05-02 NOTE — ED PROVIDER NOTE - CLINICAL SUMMARY MEDICAL DECISION MAKING FREE TEXT BOX
Mr. Terrell Garcia is a 35-year-old male with PMH of Autism c/b developmental delay, severe MR, hypothyroidism, seizures, and chronic hyponatremia presenting for acute onset of vomiting a/w dizziness. Patient is hemodynamically stable, physical exam unremarkable. Will obtain CBC, CMP, and CT head, abdomen, and pelvis for evaluation of etiology of vomiting. Will treat with Zofran and reassess.

## 2025-05-02 NOTE — CONSULT NOTE ADULT - PROBLEM SELECTOR RECOMMENDATION 9
Pt. with acute on chronic hyponatremia possibly in setting of decreased oral intake and ADH stimulation due to nausea/emesis. On review of [facility], last SNa slightly low at 133 on 11/29/24. No recent urine studies available for review.    SNa low at 118 (120 corrected) on admission. Pt with likely SIADH mediated hyponatremia. Recommend obtaining urine studies (urine lytes including urine Na) and urine osms), TSH, cortisol, uric acid. Recommend fluid restriction. Optimize glucose control. Monitor SNa.    **Recommendations preliminary until attending attestation**  If you have any questions, please feel free to contact me  Dimitri Looney  Nephrology Fellow  Page [phone] / Microsoft Teams (Preferred)  (After 4pm or on weekends please page the on-call fellow) Pt. with acute on chronic hyponatremia possibly in setting of decreased oral intake and ADH stimulation due to nausea/emesis. On review of [facility] HIE/[place], last SNa slightly low at 133 on 11/29/24. No recent urine studies available for review.    SNa low at 118 (120 corrected) on admission. Pt with likely SIADH mediated hyponatremia. Recommend obtaining urine studies (urine lytes including urine Na) and urine osms), TSH, cortisol, uric acid. Would give 3% HTS 100cc over 30 min and repeat labs in an hour. Recommend fluid restriction. Optimize glucose control. Monitor SNa.    **Recommendations preliminary until attending attestation**  If you have any questions, please feel free to contact me  Dimitri Looney  Nephrology Fellow  Page [phone] / Microsoft Teams (Preferred)  (After 4pm or on weekends please page the on-call fellow) Pt. with acute on chronic hyponatremia possibly in setting of decreased oral intake and ADH stimulation due to nausea/emesis vs. due to ADH stimulation due to medication use (oxcarbazepine (OXC), and valproic acid). On review of [facility] HIE/[place], last SNa slightly low at 133 on 11/29/24. No recent urine studies available for review.    SNa low at 118 (120 corrected) on admission. Recommend obtaining urine studies (urine lytes including urine Na) and urine osms), TSH, cortisol, uric acid. Would give 3% HTS 100cc over 30 min and repeat labs in an hour. Recommend fluid restriction. Optimize glucose control. Monitor SNa.    **Recommendations preliminary until attending attestation**  If you have any questions, please feel free to contact me  Dimitri Looney  Nephrology Fellow  Page [phone] / Microsoft Teams (Preferred)  (After 4pm or on weekends please page the on-call fellow) Pt. with acute on chronic hyponatremia possibly in setting of decreased oral intake and ADH stimulation due to nausea/emesis vs. due to ADH stimulation due to medication use (oxcarbazepine (OXC), and valproic acid). On review of [facility] HIE/[place], last SNa slightly low at 133 on 11/29/24. No recent urine studies available for review.    SNa low at 118 (120 corrected) on admission. Recommend obtaining urine studies (urine lytes including urine Na) and urine osms), TSH, cortisol, uric acid. Would give 3% HTS 100cc over 30 min and repeat labs in an hour. Recommend fluid restriction. Optimize glucose control. Monitor SNa.    Discussed with attending on call.  If you have any questions, please feel free to contact me  Dimitri Looney  Nephrology Fellow  Page [phone] / Microsoft Teams (Preferred)  (After 4pm or on weekends please page the on-call fellow) Pt. with acute on chronic hyponatremia possibly in setting of decreased oral intake and ADH stimulation due to nausea/emesis vs. due to ADH stimulation due to medication use (oxcarbazepine (OXC), and valproic acid). On review of [facility] HIE/[place], last SNa slightly low at 133 on 11/29/24. No recent urine studies available for review.    SNa low at 118 (120 corrected) on admission. Recommend obtaining urine studies (urine lytes including urine Na) and urine osms), TSH, cortisol, uric acid. Would give 3% HTS 100cc over 30 min and repeat labs in an hour. Recommend fluid restriction. Optimize glucose control. Monitor SNa. Avoid overcorrection. Goal for SNa increase is 6-8 mEq/L within the first 24 hours.     Discussed with attending on call.  If you have any questions, please feel free to contact me  Dimitri Looney  Nephrology Fellow  Page [phone] / Microsoft Teams (Preferred)  (After 4pm or on weekends please page the on-call fellow)

## 2025-05-02 NOTE — CONSULT NOTE ADULT - ASSESSMENT
35 y.o. M w/ PMHx Autism c/b developmental delay, severe MR, hypothyroidism, seizures, and chronic hyponatremia who presented to the ED for acute onset of vomiting and dizziness. Nephrology consulted for severe acute on chronic hyponatremia.

## 2025-05-02 NOTE — ED PROVIDER NOTE - ATTENDING CONTRIBUTION TO CARE
35-year-old male with PMH of Autism c/b developmental delay, severe MR, hypothyroidism, seizures, and chronic hyponatremia presenting for acute onset of vomiting a/w dizziness and abd pain. History provided by aids at beside stating [place] with [place] in the park and he had sudden onset vomiting and complained of dizziness and belly pain, they deny noting a change in stool. Pt has h/o self injurious behavior, no obvious trauma noted. Pt vomiting repeatedly in room, difficulty to main history and would not participate in exam, he is moving al 4 ext. Plan for CT Head, CT abd, labs c/f for hyponatremia.    Upon my evaluation, this patient had a high probability of imminent or life-threatening deterioration due to concern for hyponatremia to 118 which required my direct attention, intervention, and personal management.  The patient has a  medical condition that impairs one or more vital organ systems.  Frequent personal assessment and adjustment of medical interventions was performed.       I have personally provided 30 minutes of critical care time exclusive of time spent on separately billable procedures. Time includes review of laboratory data, radiology results, discussion with consultants, patient and family; monitoring for potential decompensation, as well as time spent retrieving data and reviewing the chart and documenting the visit. Interventions were performed as documented above.

## 2025-05-02 NOTE — ED ADULT NURSE NOTE - NSFALLRISKINTERV_ED_ALL_ED
Assistance OOB with selected safe patient handling equipment if applicable/Assistance with ambulation/Communicate fall risk and risk factors to all staff, patient, and family/Monitor gait and stability/Provide visual cue: yellow wristband, yellow gown, etc/Reinforce activity limits and safety measures with patient and family/Call bell, personal items and telephone in reach/Instruct patient to call for assistance before getting out of bed/chair/stretcher/Non-slip footwear applied when patient is off stretcher/[place] to call system/Physically safe environment - no spills, clutter or unnecessary equipment/Purposeful Proactive Rounding/Room/bathroom lighting operational, light cord in reach

## 2025-05-02 NOTE — ED PROVIDER NOTE - PHYSICAL EXAMINATION
CONSTITUTIONAL: NAD, well-developed  HEENT: PERRL, clear conjunctiva  RESPIRATORY: Normal respiratory effort, lungs are clear to auscultation bilaterally, no crackles/rhonchi/wheezing  CARDIOVASCULAR: Regular rate and rhythm, normal S1 and S2, no murmur/rub/gallop  ABDOMEN: Nontender to palpation, no rebound/guarding, no hepatosplenomegaly  MUSCULOSKELETAL: No joint swelling or tenderness to palpation  EXTREMITY: Lower extremities nontender to palpation, no lower extremity edema, peripheral pulses are 2+ bilaterally  NEURO: No focal deficits   PSYCH: Normal mood, affect appropriate

## 2025-05-02 NOTE — CONSULT NOTE ADULT - SUBJECTIVE AND OBJECTIVE BOX
[facility] DIVISION OF KIDNEY DISEASES AND HYPERTENSION -- [phone]  -- INITIAL CONSULT NOTE  --------------------------------------------------------------------------------  HPI: Pt. is a 35 y.o. M w/ PMHx Autism c/b developmental delay, severe MR, hypothyroidism, seizures, and chronic hyponatremia who presented to the ED for acute onset of vomiting and dizziness. Nephrology consulted for hyponatremia.    Pt. seen and examined with aides bedside. Hx as per chart and aides bedside due to pt.'s developmental delay. This morning, pt. was eating breakfast when he had multiple episodes of vomiting and felt weak. The emesis was non-bilious with food particles. No noted diarrhea and no sick contacts at the group home where pt. lives. Pt. has had hyponatremia for years and is on oral salt tablets 1gm QID and fluid restriction. Pt. had bloodwork done recently but aide's unsure of what latest sodium was. Aide's bedside unsure of etiology of hyponatremia. At baseline, pt. is minimally verbal but is alert to name and sometimes knows where he is. Pt. has a hx of seizure disorder but has not had seizures in years. Unable to obtain ROS due to clinical status.    PAST HISTORY  --------------------------------------------------------------------------------  PAST MEDICAL & SURGICAL HISTORY:  Autism    Hyperlipemia    Lyme disease    Lead poisoning    Pervasive developmental disorder, active  with self injurous behavior    Hypothyroid    Low HDL (under 40)    Enuresis    Constipation    Mental retardation  severe    Seizure disorder  - on meds, no seizures for several years    GERD (gastroesophageal reflux disease)    Pneumonia due to COVID-19 virus  Right lung, 03/2020, hospitalized for 2 weeks, no intubation    Hyponatremia    History of dysphagia    Lactose intolerance    H/O sinus bradycardia    PAC (premature atrial contraction)    Chronic hyponatremia    Elbow fracture    Community acquired pneumonia    S/P dental restoration  07/11/2018, 10/07/2019, 11/08/2019    FAMILY HISTORY:    PAST SOCIAL HISTORY:    ALLERGIES & MEDICATIONS  --------------------------------------------------------------------------------  Allergies    lactose hydrous, xanthine derivatives, caffeine (Unknown)  caffeine (Other)  xanthines (Unknown)    Intolerances    lactose (Diarrhea)    Standing Inpatient Medications    PRN Inpatient Medications    REVIEW OF SYSTEMS  --------------------------------------------------------------------------------  Unable to obtain ROS.    VITALS/PHYSICAL EXAM  --------------------------------------------------------------------------------  T(C): 36.5 (05-02-25 @ 17:09), Max: 36.5 (05-02-25 @ 17:09)  HR: 84 (05-02-25 @ 17:09) (76 - 84)  BP: 152/92 (05-02-25 @ 17:09) (152/92 - 161/94)  RR: 16 (05-02-25 @ 17:09) (16 - 16)  SpO2: 100% (05-02-25 @ 17:09) (98% - 100%)  Wt(kg): --  Height (cm): 165.1 (05-02-25 @ 13:33)  Weight (kg): 63.5 (05-02-25 @ 13:33)  BMI (kg/m2): 23.3 (05-02-25 @ 13:33)  BSA (m2): 1.7 (05-02-25 @ 13:33)    Physical Exam:  Gen: NAD  HEENT: MMM  Pulm: CTA B/L  CV: S1S2  Abd: Soft, +BS   Ext: No LE edema B/L  Neuro: Resting  Skin: Warm and dry  Vascular access: peripheral IV    LABS/STUDIES  --------------------------------------------------------------------------------              14.0   13.81 >-----------<  263      [05-02-25 @ 15:47]              39.4     118  |  85  |  9   ----------------------------<  220      [05-02-25 @ 15:47]  5.0   |  19  |  0.43        Ca     8.9     [05-02-25 @ 15:47]      Mg     1.60     [05-02-25 @ 15:47]      Phos  3.0     [05-02-25 @ 15:47]    TPro  7.8  /  Alb  4.4  /  TBili  0.2  /  DBili  x   /  AST  27  /  ALT  13  /  AlkPhos  111  [05-02-25 @ 15:47]    Creatinine Trend:  SCr 0.43 [05-02 @ 15:47] [facility] DIVISION OF KIDNEY DISEASES AND HYPERTENSION -- [phone]  -- INITIAL CONSULT NOTE  --------------------------------------------------------------------------------  HPI: Pt. is a 35 y.o. M w/ PMHx Autism c/b developmental delay, severe MR, hypothyroidism, seizures, and chronic hyponatremia who presented to the ED for acute onset of vomiting and dizziness. Nephrology consulted for hyponatremia.    Pt. seen and examined with aides bedside. Hx as per chart and aides bedside due to pt.'s developmental delay. This morning, pt. was eating breakfast when he had multiple episodes of vomiting and felt weak. The emesis was non-bilious with food particles. No noted diarrhea and no sick contacts at the group home where pt. lives. Pt. has had hyponatremia for years and is on oral salt tablets 1gm QID and fluid restriction. Pt. had bloodwork done recently but aide's unsure of what latest sodium was. Aide's bedside unsure of etiology of hyponatremia. At baseline, pt. is minimally verbal but is alert to name and sometimes knows where he is. Pt. has a hx of seizure disorder but has not had seizures in years. Unable to obtain ROS due to clinical status.    PAST HISTORY  --------------------------------------------------------------------------------  PAST MEDICAL & SURGICAL HISTORY:  Autism    Hyperlipemia    Lyme disease    Lead poisoning    Pervasive developmental disorder, active  with self injurous behavior    Hypothyroid    Low HDL (under 40)    Enuresis    Constipation    Mental retardation  severe    Seizure disorder  - on meds, no seizures for several years    GERD (gastroesophageal reflux disease)    Pneumonia due to COVID-19 virus  Right lung, 03/2020, hospitalized for 2 weeks, no intubation    Hyponatremia    History of dysphagia    Lactose intolerance    H/O sinus bradycardia    PAC (premature atrial contraction)    Chronic hyponatremia    Elbow fracture    Community acquired pneumonia    S/P dental restoration  07/11/2018, 10/07/2019, 11/08/2019    FAMILY HISTORY: unobtainable    PAST SOCIAL HISTORY: no reported substance use.     ALLERGIES & MEDICATIONS  --------------------------------------------------------------------------------  Allergies    lactose hydrous, xanthine derivatives, caffeine (Unknown)  caffeine (Other)  xanthines (Unknown)    Intolerances    lactose (Diarrhea)    Standing Inpatient Medications    PRN Inpatient Medications    REVIEW OF SYSTEMS  --------------------------------------------------------------------------------  Unable to obtain ROS.    VITALS/PHYSICAL EXAM  --------------------------------------------------------------------------------  T(C): 36.5 (05-02-25 @ 17:09), Max: 36.5 (05-02-25 @ 17:09)  HR: 84 (05-02-25 @ 17:09) (76 - 84)  BP: 152/92 (05-02-25 @ 17:09) (152/92 - 161/94)  RR: 16 (05-02-25 @ 17:09) (16 - 16)  SpO2: 100% (05-02-25 @ 17:09) (98% - 100%)  Wt(kg): --  Height (cm): 165.1 (05-02-25 @ 13:33)  Weight (kg): 63.5 (05-02-25 @ 13:33)  BMI (kg/m2): 23.3 (05-02-25 @ 13:33)  BSA (m2): 1.7 (05-02-25 @ 13:33)    Physical Exam:  Gen: NAD  HEENT: MMM  Pulm: CTA B/L  CV: S1S2  Abd: Soft, +BS   Ext: No LE edema B/L  Neuro: Resting  Skin: Warm and dry  Vascular access: peripheral IV    LABS/STUDIES  --------------------------------------------------------------------------------              14.0   13.81 >-----------<  263      [05-02-25 @ 15:47]              39.4     118  |  85  |  9   ----------------------------<  220      [05-02-25 @ 15:47]  5.0   |  19  |  0.43        Ca     8.9     [05-02-25 @ 15:47]      Mg     1.60     [05-02-25 @ 15:47]      Phos  3.0     [05-02-25 @ 15:47]    TPro  7.8  /  Alb  4.4  /  TBili  0.2  /  DBili  x   /  AST  27  /  ALT  13  /  AlkPhos  111  [05-02-25 @ 15:47]    Creatinine Trend:  SCr 0.43 [05-02 @ 15:47]

## 2025-05-02 NOTE — ED ADULT TRIAGE NOTE - CHIEF COMPLAINT QUOTE
C/O abdominal pain, N/V since 30 minutes ago. Per caretaker, patient's "sodium levels are off, so he has been on water restriction." He has developmental delay. Refer to chart for full history. Unable to obtain temp in triage.

## 2025-05-02 NOTE — CONSULT NOTE ADULT - ATTENDING COMMENTS
Patient with history of autism, MR, hypothyroidism, seizure disorder and chronic hyponatremia, who presented with complaint of vomiting and dizziness.  Sodium level noted at 118 and provided with 3% HTS.  Most recent sodium 123.  Patient normally on oral sodium chloride tablets (1 QID) and fluid restriction.  1. Hyponatremia- underlying chronic hyponatremia can be related to his medication use, i.e. Oxcarbazepime.  Acute component may be ADH release.  As noted above, need urine lytes along with TSH, uric acid, and cortisol.  Continue with fluid restriction, oral NaCl tabs (2 gm TID) and serial lab monitoring.  Further suggestions per course.

## 2025-05-02 NOTE — ED PROVIDER NOTE - PROGRESS NOTE DETAILS
NA of 118 on CMP, Nephrology consulted. Félix PGY3: Nephrology recommending 3% hypertonic saline 100mL over 30 mins and recheck labs after

## 2025-05-02 NOTE — ED ADULT NURSE NOTE - OBJECTIVE STATEMENT
Ambulatory at baseline. Hx of developmental delay. Pt non verbal at this time. Pt care taker at bedside. Pt c/o abdominal pain and nausea. Pt has been actively vomiting for last hour. Pt caretaker stated, patient's "sodium levels are off, so he has been on water restriction." Pt appears lethargic. Caretaker denies diarrhea.  Heart sounds S1 and S2 heard upon auscultation. +2 pulses palpated in all extremities. Capillary refill less than 3 seconds. Breathing even and unlabored. Clear breath sounds. No limb restrictions. No pedal edema. Abdomen soft, non tender, non distended. Normoactive bowel sounds heard in all four quadrants. No urinary symptoms. Safety and comfort measures in place- bed in lowest position, side rails up, call bell within reach. Waiting upon further orders. Ambulatory at baseline. Hx of developmental delay. Pt non verbal at this time. Pt care taker at bedside. Pt c/o abdominal pain and nausea. Pt has been actively vomiting for last hour. Pt caretaker stated, patient's "sodium levels are off, so he has been on water restriction." Pt appears lethargic. Caretaker denies diarrhea.  Denies any recent head trauma or injury. Heart sounds S1 and S2 heard upon auscultation. +2 pulses palpated in all extremities. Capillary refill less than 3 seconds. Breathing even and unlabored. Clear breath sounds. No limb restrictions. No pedal edema. Abdomen soft, non tender, non distended. Normoactive bowel sounds heard in all four quadrants. No urinary symptoms. Safety and comfort measures in place- bed in lowest position, side rails up, call bell within reach. Waiting upon further orders.

## 2025-05-03 DIAGNOSIS — Z29.9 ENCOUNTER FOR PROPHYLACTIC MEASURES, UNSPECIFIED: ICD-10-CM

## 2025-05-03 DIAGNOSIS — F72 SEVERE INTELLECTUAL DISABILITIES: ICD-10-CM

## 2025-05-03 DIAGNOSIS — K59.00 CONSTIPATION, UNSPECIFIED: ICD-10-CM

## 2025-05-03 DIAGNOSIS — R11.10 VOMITING, UNSPECIFIED: ICD-10-CM

## 2025-05-03 DIAGNOSIS — E87.1 HYPO-OSMOLALITY AND HYPONATREMIA: ICD-10-CM

## 2025-05-03 DIAGNOSIS — G40.909 EPILEPSY, UNSPECIFIED, NOT INTRACTABLE, WITHOUT STATUS EPILEPTICUS: ICD-10-CM

## 2025-05-03 DIAGNOSIS — K21.9 GASTRO-ESOPHAGEAL REFLUX DISEASE WITHOUT ESOPHAGITIS: ICD-10-CM

## 2025-05-03 DIAGNOSIS — E03.9 HYPOTHYROIDISM, UNSPECIFIED: ICD-10-CM

## 2025-05-03 LAB
ADD ON TEST-SPECIMEN IN LAB: SIGNIFICANT CHANGE UP
ANION GAP SERPL CALC-SCNC: 14 MMOL/L — SIGNIFICANT CHANGE UP (ref 7–14)
ANION GAP SERPL CALC-SCNC: 14 MMOL/L — SIGNIFICANT CHANGE UP (ref 7–14)
ANION GAP SERPL CALC-SCNC: 15 MMOL/L — HIGH (ref 7–14)
BACTERIA # UR AUTO: NEGATIVE /HPF — SIGNIFICANT CHANGE UP
BUN SERPL-MCNC: 13 MG/DL — SIGNIFICANT CHANGE UP (ref 7–23)
BUN SERPL-MCNC: 8 MG/DL — SIGNIFICANT CHANGE UP (ref 7–23)
BUN SERPL-MCNC: 9 MG/DL — SIGNIFICANT CHANGE UP (ref 7–23)
CALCIUM SERPL-MCNC: 8.6 MG/DL — SIGNIFICANT CHANGE UP (ref 8.4–10.5)
CALCIUM SERPL-MCNC: 8.9 MG/DL — SIGNIFICANT CHANGE UP (ref 8.4–10.5)
CALCIUM SERPL-MCNC: 9.4 MG/DL — SIGNIFICANT CHANGE UP (ref 8.4–10.5)
CAST: 0 /LPF — SIGNIFICANT CHANGE UP (ref 0–4)
CHLORIDE SERPL-SCNC: 86 MMOL/L — LOW (ref 98–107)
CHLORIDE SERPL-SCNC: 89 MMOL/L — LOW (ref 98–107)
CHLORIDE SERPL-SCNC: 91 MMOL/L — LOW (ref 98–107)
CO2 SERPL-SCNC: 20 MMOL/L — LOW (ref 22–31)
CO2 SERPL-SCNC: 20 MMOL/L — LOW (ref 22–31)
CO2 SERPL-SCNC: 21 MMOL/L — LOW (ref 22–31)
CREAT ?TM UR-MCNC: 53 MG/DL — SIGNIFICANT CHANGE UP
CREAT SERPL-MCNC: 0.45 MG/DL — LOW (ref 0.5–1.3)
CREAT SERPL-MCNC: 0.45 MG/DL — LOW (ref 0.5–1.3)
CREAT SERPL-MCNC: 0.64 MG/DL — SIGNIFICANT CHANGE UP (ref 0.5–1.3)
EGFR: 127 ML/MIN/1.73M2 — SIGNIFICANT CHANGE UP
EGFR: 127 ML/MIN/1.73M2 — SIGNIFICANT CHANGE UP
EGFR: 141 ML/MIN/1.73M2 — SIGNIFICANT CHANGE UP
GLUCOSE SERPL-MCNC: 113 MG/DL — HIGH (ref 70–99)
GLUCOSE SERPL-MCNC: 71 MG/DL — SIGNIFICANT CHANGE UP (ref 70–99)
GLUCOSE SERPL-MCNC: 96 MG/DL — SIGNIFICANT CHANGE UP (ref 70–99)
HCT VFR BLD CALC: 38.9 % — LOW (ref 39–50)
HGB BLD-MCNC: 14.1 G/DL — SIGNIFICANT CHANGE UP (ref 13–17)
MAGNESIUM SERPL-MCNC: 1.6 MG/DL — SIGNIFICANT CHANGE UP (ref 1.6–2.6)
MAGNESIUM SERPL-MCNC: 1.8 MG/DL — SIGNIFICANT CHANGE UP (ref 1.6–2.6)
MAGNESIUM SERPL-MCNC: 1.8 MG/DL — SIGNIFICANT CHANGE UP (ref 1.6–2.6)
MCHC RBC-ENTMCNC: 32.3 PG — SIGNIFICANT CHANGE UP (ref 27–34)
MCHC RBC-ENTMCNC: 36.2 G/DL — HIGH (ref 32–36)
MCV RBC AUTO: 89.2 FL — SIGNIFICANT CHANGE UP (ref 80–100)
NRBC # BLD AUTO: 0 K/UL — SIGNIFICANT CHANGE UP (ref 0–0)
NRBC # FLD: 0 K/UL — SIGNIFICANT CHANGE UP (ref 0–0)
NRBC BLD AUTO-RTO: 0 /100 WBCS — SIGNIFICANT CHANGE UP (ref 0–0)
PHOSPHATE SERPL-MCNC: 3.3 MG/DL — SIGNIFICANT CHANGE UP (ref 2.5–4.5)
PHOSPHATE SERPL-MCNC: 3.3 MG/DL — SIGNIFICANT CHANGE UP (ref 2.5–4.5)
PHOSPHATE SERPL-MCNC: 3.5 MG/DL — SIGNIFICANT CHANGE UP (ref 2.5–4.5)
PLATELET # BLD AUTO: 258 K/UL — SIGNIFICANT CHANGE UP (ref 150–400)
POTASSIUM SERPL-MCNC: 4.2 MMOL/L — SIGNIFICANT CHANGE UP (ref 3.5–5.3)
POTASSIUM SERPL-MCNC: 4.2 MMOL/L — SIGNIFICANT CHANGE UP (ref 3.5–5.3)
POTASSIUM SERPL-MCNC: 4.3 MMOL/L — SIGNIFICANT CHANGE UP (ref 3.5–5.3)
POTASSIUM SERPL-SCNC: 4.2 MMOL/L — SIGNIFICANT CHANGE UP (ref 3.5–5.3)
POTASSIUM SERPL-SCNC: 4.2 MMOL/L — SIGNIFICANT CHANGE UP (ref 3.5–5.3)
POTASSIUM SERPL-SCNC: 4.3 MMOL/L — SIGNIFICANT CHANGE UP (ref 3.5–5.3)
POTASSIUM UR-SCNC: 51.4 MMOL/L — SIGNIFICANT CHANGE UP
PROT ?TM UR-MCNC: 37 MG/DL — SIGNIFICANT CHANGE UP
PROT/CREAT UR-RTO: 0.7 RATIO — HIGH (ref 0–0.2)
RBC # BLD: 4.36 M/UL — SIGNIFICANT CHANGE UP (ref 4.2–5.8)
RBC # FLD: 12.2 % — SIGNIFICANT CHANGE UP (ref 10.3–14.5)
RBC CASTS # UR COMP ASSIST: 1 /HPF — SIGNIFICANT CHANGE UP (ref 0–4)
SODIUM SERPL-SCNC: 121 MMOL/L — LOW (ref 135–145)
SODIUM SERPL-SCNC: 123 MMOL/L — LOW (ref 135–145)
SODIUM SERPL-SCNC: 126 MMOL/L — LOW (ref 135–145)
SODIUM UR-SCNC: 182 MMOL/L — SIGNIFICANT CHANGE UP
SQUAMOUS # UR AUTO: 0 /HPF — SIGNIFICANT CHANGE UP (ref 0–5)
T4 FREE SERPL-MCNC: 1 NG/DL — SIGNIFICANT CHANGE UP (ref 0.9–1.8)
TSH SERPL-MCNC: 1.17 UIU/ML — SIGNIFICANT CHANGE UP (ref 0.27–4.2)
UUN UR-MCNC: 360.5 MG/DL — SIGNIFICANT CHANGE UP
VALPROATE SERPL-MCNC: 67.9 UG/ML — SIGNIFICANT CHANGE UP (ref 50–100)
WBC # BLD: 10.7 K/UL — HIGH (ref 3.8–10.5)
WBC # FLD AUTO: 10.7 K/UL — HIGH (ref 3.8–10.5)
WBC UR QL: 1 /HPF — SIGNIFICANT CHANGE UP (ref 0–5)

## 2025-05-03 PROCEDURE — 99223 1ST HOSP IP/OBS HIGH 75: CPT

## 2025-05-03 PROCEDURE — 99222 1ST HOSP IP/OBS MODERATE 55: CPT | Mod: GC

## 2025-05-03 RX ORDER — LEVOTHYROXINE SODIUM 300 MCG
50 TABLET ORAL DAILY
Refills: 0 | Status: DISCONTINUED | OUTPATIENT
Start: 2025-05-03 | End: 2025-05-06

## 2025-05-03 RX ORDER — MELATONIN 5 MG
3 TABLET ORAL AT BEDTIME
Refills: 0 | Status: DISCONTINUED | OUTPATIENT
Start: 2025-05-03 | End: 2025-05-06

## 2025-05-03 RX ORDER — PSYLLIUM SEED (WITH DEXTROSE)
1 POWDER (GRAM) ORAL
Refills: 0 | Status: DISCONTINUED | OUTPATIENT
Start: 2025-05-03 | End: 2025-05-06

## 2025-05-03 RX ORDER — CYST/ALA/Q10/PHOS.SER/DHA/BROC 100-20-50
1 POWDER (GRAM) ORAL DAILY
Refills: 0 | Status: DISCONTINUED | OUTPATIENT
Start: 2025-05-03 | End: 2025-05-03

## 2025-05-03 RX ORDER — THIOTHIXENE 1 MG
10 CAPSULE ORAL
Refills: 0 | Status: DISCONTINUED | OUTPATIENT
Start: 2025-05-03 | End: 2025-05-06

## 2025-05-03 RX ORDER — LATANOPROST PF 0.05 MG/ML
1 SOLUTION/ DROPS OPHTHALMIC
Refills: 0 | DISCHARGE

## 2025-05-03 RX ORDER — POLYETHYLENE GLYCOL 3350 17 G/17G
17 POWDER, FOR SOLUTION ORAL DAILY
Refills: 0 | Status: DISCONTINUED | OUTPATIENT
Start: 2025-05-03 | End: 2025-05-06

## 2025-05-03 RX ORDER — FLUTICASONE PROPIONATE 50 UG/1
1 SPRAY, METERED NASAL
Refills: 0 | DISCHARGE

## 2025-05-03 RX ORDER — OXCARBAZEPINE 150 MG/1
1200 TABLET, FILM COATED ORAL
Refills: 0 | Status: DISCONTINUED | OUTPATIENT
Start: 2025-05-03 | End: 2025-05-06

## 2025-05-03 RX ORDER — FLUTICASONE PROPIONATE 50 UG/1
1 SPRAY, METERED NASAL
Refills: 0 | Status: DISCONTINUED | OUTPATIENT
Start: 2025-05-03 | End: 2025-05-06

## 2025-05-03 RX ORDER — LATANOPROST PF 0.05 MG/ML
1 SOLUTION/ DROPS OPHTHALMIC AT BEDTIME
Refills: 0 | Status: DISCONTINUED | OUTPATIENT
Start: 2025-05-03 | End: 2025-05-06

## 2025-05-03 RX ORDER — MAGNESIUM, ALUMINUM HYDROXIDE 200-200 MG
30 TABLET,CHEWABLE ORAL EVERY 4 HOURS
Refills: 0 | Status: DISCONTINUED | OUTPATIENT
Start: 2025-05-03 | End: 2025-05-06

## 2025-05-03 RX ORDER — ACETAMINOPHEN 500 MG/5ML
650 LIQUID (ML) ORAL EVERY 6 HOURS
Refills: 0 | Status: DISCONTINUED | OUTPATIENT
Start: 2025-05-03 | End: 2025-05-06

## 2025-05-03 RX ORDER — B1/B2/B3/B5/B6/B12/VIT C/FOLIC 500-0.5 MG
1 TABLET ORAL DAILY
Refills: 0 | Status: DISCONTINUED | OUTPATIENT
Start: 2025-05-03 | End: 2025-05-06

## 2025-05-03 RX ORDER — ONDANSETRON HCL/PF 4 MG/2 ML
4 VIAL (ML) INJECTION EVERY 8 HOURS
Refills: 0 | Status: DISCONTINUED | OUTPATIENT
Start: 2025-05-03 | End: 2025-05-06

## 2025-05-03 RX ORDER — ENOXAPARIN SODIUM 100 MG/ML
40 INJECTION SUBCUTANEOUS EVERY 24 HOURS
Refills: 0 | Status: DISCONTINUED | OUTPATIENT
Start: 2025-05-03 | End: 2025-05-06

## 2025-05-03 RX ORDER — SODIUM CHLORIDE 0.65 %
2 AEROSOL, SPRAY (ML) NASAL DAILY
Refills: 0 | Status: DISCONTINUED | OUTPATIENT
Start: 2025-05-03 | End: 2025-05-06

## 2025-05-03 RX ORDER — OXYBUTYNIN CHLORIDE 5 MG/1
5 TABLET, FILM COATED, EXTENDED RELEASE ORAL THREE TIMES A DAY
Refills: 0 | Status: DISCONTINUED | OUTPATIENT
Start: 2025-05-03 | End: 2025-05-06

## 2025-05-03 RX ORDER — OXCARBAZEPINE 150 MG/1
600 TABLET, FILM COATED ORAL
Refills: 0 | Status: DISCONTINUED | OUTPATIENT
Start: 2025-05-03 | End: 2025-05-06

## 2025-05-03 RX ADMIN — ENOXAPARIN SODIUM 40 MILLIGRAM(S): 100 INJECTION SUBCUTANEOUS at 06:24

## 2025-05-03 RX ADMIN — FLUTICASONE PROPIONATE 1 SPRAY(S): 50 SPRAY, METERED NASAL at 06:24

## 2025-05-03 RX ADMIN — Medication 3 MILLIGRAM(S): at 21:31

## 2025-05-03 RX ADMIN — Medication 2 GRAM(S): at 21:31

## 2025-05-03 RX ADMIN — LATANOPROST PF 1 DROP(S): 0.05 SOLUTION/ DROPS OPHTHALMIC at 21:31

## 2025-05-03 RX ADMIN — Medication 1 PACKET(S): at 06:24

## 2025-05-03 RX ADMIN — OXYBUTYNIN CHLORIDE 5 MILLIGRAM(S): 5 TABLET, FILM COATED, EXTENDED RELEASE ORAL at 14:26

## 2025-05-03 RX ADMIN — OXYBUTYNIN CHLORIDE 5 MILLIGRAM(S): 5 TABLET, FILM COATED, EXTENDED RELEASE ORAL at 06:24

## 2025-05-03 RX ADMIN — POLYETHYLENE GLYCOL 3350 17 GRAM(S): 17 POWDER, FOR SOLUTION ORAL at 14:27

## 2025-05-03 RX ADMIN — Medication 50 MICROGRAM(S): at 06:24

## 2025-05-03 RX ADMIN — Medication 10 MILLIGRAM(S): at 21:31

## 2025-05-03 RX ADMIN — Medication 500 MILLIGRAM(S): at 14:26

## 2025-05-03 RX ADMIN — Medication 1000 MILLIGRAM(S): at 21:30

## 2025-05-03 RX ADMIN — OXCARBAZEPINE 1200 MILLIGRAM(S): 150 TABLET, FILM COATED ORAL at 21:30

## 2025-05-03 RX ADMIN — OXYBUTYNIN CHLORIDE 5 MILLIGRAM(S): 5 TABLET, FILM COATED, EXTENDED RELEASE ORAL at 21:31

## 2025-05-03 RX ADMIN — Medication 2 SPRAY(S): at 14:27

## 2025-05-03 RX ADMIN — FLUTICASONE PROPIONATE 1 SPRAY(S): 50 SPRAY, METERED NASAL at 18:19

## 2025-05-03 RX ADMIN — Medication 2 GRAM(S): at 14:30

## 2025-05-03 RX ADMIN — Medication 10 MILLIGRAM(S): at 14:26

## 2025-05-03 RX ADMIN — OXCARBAZEPINE 600 MILLIGRAM(S): 150 TABLET, FILM COATED ORAL at 09:09
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
## 2025-05-03 NOTE — H&P ADULT - NSHPPHYSICALEXAM_GEN_ALL_CORE
General: Patient lying in bed comfortably. Is in no acute distress.   Eyes: PERRL, EOMI  Resp: CTA b/l. No wheezing, rales or rhonchi.   CVS: RRR. S1 & S2 +. No murmurs, rubs or gallops  GI: Soft, NDNT. Bowel sounds x 4 quadrants +  Extremities: No edema, cyanosis or clubbing  Neuro: Alert and oriented x 0. Moving all 4 extremities to painful stimuli. Not following commands. Repeating "home" when questions were asked General: Patient lying in bed comfortably. Is in no acute distress.   Eyes: PERRL, EOMI  Resp: CTA b/l. No wheezing, rales or rhonchi.   CVS: RRR. S1 & S2 +. No murmurs, rubs or gallops  GI: Soft, NDNT. Bowel sounds x 4 quadrants +  : [place] catheter in place with drainage of light yellow urine.   Extremities: No edema, cyanosis or clubbing  Neuro: Alert and oriented x 0. Moving all 4 extremities to painful stimuli. Not following commands. Repeating "home" when questions were asked

## 2025-05-03 NOTE — H&P ADULT - PROBLEM SELECTOR PLAN 1
Acute on chronic hyponatremia possibly due to SIADH due to medications (Oxcarbazepine and Depakote ER) vs decrease in PO intake     Monitor Na  Avoid overcorrection - goal is to increase Na by 6-8 mEq/L in 24 hours   Fluid restriction   Nephrology recommendations appreciated Acute on chronic hyponatremia possibly due to SIADH due to medications (Oxcarbazepine and Depakote ER) vs decrease in PO intake     Monitor Na  Avoid overcorrection - goal is to increase Na by 6-8 mEq/L in 24 hours   Fluid restriction   Strict Is/Os   Nephrology recommendations appreciated Presented to the ED due to vomiting and change in mental status   On arrival had Na of 118 and received 100 cc of 3% HTS with improvement to 121  Nephrology consulted and provided recommendations  Home medications: NaCl 1 gm 4 times/day     Acute on chronic hyponatremia possibly due to SIADH due to medications (Oxcarbazepine and Depakote ER) vs decrease in PO intake     Monitor Na  Avoid overcorrection - goal is to increase Na by 6-8 mEq/L in 24 hours   Hold home dose of NaCl tablets until discussed with Nephrology   Fluid restriction   Strict Is/Os   Nephrology recommendations appreciated

## 2025-05-03 NOTE — H&P ADULT - PROBLEM/PLAN-6
Assessment/Plan:    No problem-specific Assessment & Plan notes found for this encounter  Diagnoses and all orders for this visit:    Mild intermittent asthma without complication  -     albuterol (2 5 mg/3 mL) 0 083 % nebulizer solution; Take 3 mL (2 5 mg total) by nebulization every 6 (six) hours as needed for wheezing or shortness of breath  -     Nebulizer Supplies      viral trigger  Rest, fluids, can use Tylenol or ibuprofen as needed for fever  Using a humidifier may be helpful as well  Call if not improved in next few days or gets any worse        Subjective:     History provided by: mother     Patient ID: Lavern Rey is a 11 y o  female  Cough, congestion last 3 days, barky wheezy cough  History of asthma, used nebulizer but no daily meds only when sick  No admissions for asthma and mom does not think she has been on a steroid  No smokers at home  + dog      The following portions of the patient's history were reviewed and updated as appropriate: allergies, current medications, past family history, past medical history, past social history, past surgical history and problem list     Review of Systems   Constitutional: Negative for activity change, appetite change and fever  HENT: Negative for ear pain and sore throat  Gastrointestinal: Negative for abdominal pain, diarrhea and vomiting  Neurological: Negative for headaches  Objective:      Pulse 92   Temp 98 5 °F (36 9 °C)   Ht 3' 5 5" (1 054 m)   Wt 15 6 kg (34 lb 6 4 oz)   SpO2 96%   BMI 14 04 kg/m²          Physical Exam  Vitals and nursing note reviewed  Constitutional:       General: She is active  She is not in acute distress  HENT:      Right Ear: Tympanic membrane normal       Left Ear: Tympanic membrane normal       Nose: Congestion present  Mouth/Throat:      Mouth: Mucous membranes are moist       Pharynx: Oropharynx is clear  Tonsils: No tonsillar exudate     Eyes:      Conjunctiva/sclera: Conjunctivae normal       Pupils: Pupils are equal, round, and reactive to light  Cardiovascular:      Rate and Rhythm: Regular rhythm  Heart sounds: S1 normal and S2 normal    Pulmonary:      Effort: Pulmonary effort is normal  No respiratory distress  Breath sounds: Normal breath sounds and air entry  No wheezing  Abdominal:      General: There is no distension  Palpations: Abdomen is soft  Tenderness: There is no abdominal tenderness  Musculoskeletal:         General: Normal range of motion  Cervical back: Normal range of motion  Lymphadenopathy:      Cervical: No cervical adenopathy  Skin:     General: Skin is warm  Capillary Refill: Capillary refill takes less than 2 seconds  Neurological:      Mental Status: She is alert  DISPLAY PLAN FREE TEXT

## 2025-05-03 NOTE — H&P ADULT - PROBLEM SELECTOR PLAN 6
DVT prophylaxis: Lovenox  GI prophylaxis: NI  Diet: Regular, E2C  Ethics: Full code  Disposition: Pending clinical course

## 2025-05-03 NOTE — H&P ADULT - HISTORY OF PRESENT ILLNESS
Further information obtained from Aide at bedside who has taken care of the patient intermittently for the past 3 years.   34 y/o M with PMHx of severe intellectual disability, autism c/b developmental delay, hypothyroidism, seizure disorder, lead poisoning, chronic constipation, enuresis, and chronic hyponatremia presented to the ED due to acute onset of vomiting which started around noon on 05/02/2025. In the AM patient was eating his breakfast and had multiple episodes of vomiting and felt weak. The emesis was NBNB with food particles. In the ED noted to have a sodium level of 118 and received 100 cc of 3% HTS and repeat labs demonstrated Na of 121.   Patient has a history of chronic hyponatremia and was previously admitted for similar complaint in 11/2021. He is on NaCl tablets 1 gm 4 times/day and on fluid restriction at the group home and as per aide at bedside patient has been compliant with medications. As per Aide patient can interact and converse with people at baseline. His current mental status is not his baseline and he is more confused as per Aide. At the group home he is able to ambulate with no assistance but has an unsteady gait.  Further information obtained from Aide at bedside who has taken care of the patient intermittently for the past 3 years.   34 y/o M with PMHx of severe intellectual disability, autism c/b developmental delay, hypothyroidism, seizure disorder, lead poisoning, chronic constipation, enuresis, and chronic hyponatremia presented to the ED due to acute onset of vomiting which started around noon on 05/02/2025. In the AM patient was eating his breakfast and had multiple episodes of vomiting and felt weak. The emesis was NBNB with food particles. In the ED noted to have a sodium level of 118 and received 100 cc of 3% HTS and repeat labs demonstrated Na of 121. Nephrology was consulted and recommended fluid restriction, monitor Na and to avoid overcorrection.   Patient has a history of chronic hyponatremia and was previously admitted for similar complaint in 11/2021. He is on NaCl tablets 1 gm 4 times/day and on fluid restriction at the group home and as per aide at bedside patient has been compliant with medications. As per Aide patient can interact and converse with people at baseline. His current mental status is not his baseline and he is more confused as per Aide. At the group home he is able to ambulate with no assistance but has an unsteady gait.

## 2025-05-03 NOTE — H&P ADULT - PROBLEM SELECTOR PLAN 2
Oxcarbazepine   Depakote ER  Follow oxcarbazepine and valproic acid level   Maintain seizure precautions Home medications: Oxcarbazepine 600 mg 2 tabs at bedtime, Oxcarbazepine 600 mg daily in the AM, Depakote  mg in the AM and Depakote 1000 mg at bedtime   No breakthrough seizures as per group home aide at bedside    Oxcarbazepine   Depakote ER  Follow oxcarbazepine and valproic acid level   Maintain seizure precautions

## 2025-05-03 NOTE — H&P ADULT - NSHPLABSRESULTS_GEN_ALL_CORE
14.0   13.81 )-----------( 263      ( 02 May 2025 15:47 )             39.4     05-02    121[L]  |  86[L]  |  9   ----------------------------<  113[H]  4.2   |  21[L]  |  0.45[L]    Ca    8.6      02 May 2025 23:31  Phos  3.5     05-02  Mg     1.60     05-02    TPro  7.8  /  Alb  4.4  /  TBili  0.2  /  DBili  x   /  AST  27  /  ALT  13  /  AlkPhos  111  05-02      Urinalysis Basic - ( 02 May 2025 23:31 )    Color: x / Appearance: x / SG: x / pH: x  Gluc: 113 mg/dL / Ketone: x  / Bili: x / Urobili: x   Blood: x / Protein: x / Nitrite: x   Leuk Esterase: x / RBC: x / WBC x   Sq Epi: x / Non Sq Epi: x / Bacteria: x      RADIOLOGY, EKG & ADDITIONAL TESTS: Reviewed.
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
## 2025-05-03 NOTE — H&P ADULT - ASSESSMENT
34 y/o M with PMHx of severe intellectual disability, autism c/b developmental delay, hypothyroidism, seizure disorder, lead poisoning, chronic constipation, enuresis, and chronic hyponatremia presented to the ED due to acute onset of vomiting which started around noon on 05/02/2025. In the ED noted to have a sodium level of 118 and received 100 cc of 3% HTS and repeat labs demonstrated Na of 121. Nephrology was consulted and recommended fluid restriction, monitor Na and to avoid overcorrection. He will be admitted for management of hyponatremia.

## 2025-05-03 NOTE — PATIENT PROFILE ADULT - FALL HARM RISK - HARM RISK INTERVENTIONS
Assistance with ambulation/Assistance OOB with selected safe patient handling equipment/Communicate Risk of Fall with Harm to all staff/Discuss with provider need for PT consult/Monitor for mental status changes/Monitor gait and stability/Move patient closer to nurses' station/Reinforce activity limits and safety measures with patient and family/Reorient to person, place and time as needed/Tailored Fall Risk Interventions/Toileting schedule using arm’s reach rule for commode and bathroom/Use of alarms - bed, chair and/or voice tab/Visual Cue: Yellow wristband and red socks/Bed in lowest position, wheels locked, appropriate side rails in place/Call bell, personal items and telephone in reach/Instruct patient to call for assistance before getting out of bed or chair/Non-slip footwear when patient is out of bed/[place] to call system/Physically safe environment - no spills, clutter or unnecessary equipment/Purposeful Proactive Rounding/Room/bathroom lighting operational, light cord in reach

## 2025-05-03 NOTE — H&P ADULT - PROBLEM SELECTOR PLAN 5
Psyllium powder  Miralax daily Hx of chronic constipation  Home medications: Colace 200 mg daily, Kristalose 10 g oral powder daily, Reguloid 400 mg 2 caps daily     Psyllium powder  Miralax daily

## 2025-05-03 NOTE — H&P ADULT - TIME-BASED BILLING (NON-CRITICAL CARE)
Detail Level: Detailed Quality 226: Preventive Care And Screening: Tobacco Use: Screening And Cessation Intervention: Patient screened for tobacco use and is an ex/non-smoker Quality 47: Advance Care Plan: Advance care planning not documented, reason not otherwise specified. Time-based billing (NON-critical care)

## 2025-05-04 LAB
ANION GAP SERPL CALC-SCNC: 12 MMOL/L — SIGNIFICANT CHANGE UP (ref 7–14)
BUN SERPL-MCNC: 12 MG/DL — SIGNIFICANT CHANGE UP (ref 7–23)
CALCIUM SERPL-MCNC: 8.7 MG/DL — SIGNIFICANT CHANGE UP (ref 8.4–10.5)
CHLORIDE SERPL-SCNC: 93 MMOL/L — LOW (ref 98–107)
CO2 SERPL-SCNC: 21 MMOL/L — LOW (ref 22–31)
CREAT SERPL-MCNC: 0.68 MG/DL — SIGNIFICANT CHANGE UP (ref 0.5–1.3)
EGFR: 124 ML/MIN/1.73M2 — SIGNIFICANT CHANGE UP
EGFR: 124 ML/MIN/1.73M2 — SIGNIFICANT CHANGE UP
GLUCOSE SERPL-MCNC: 83 MG/DL — SIGNIFICANT CHANGE UP (ref 70–99)
HCT VFR BLD CALC: 38.1 % — LOW (ref 39–50)
HGB BLD-MCNC: 13.4 G/DL — SIGNIFICANT CHANGE UP (ref 13–17)
MAGNESIUM SERPL-MCNC: 1.8 MG/DL — SIGNIFICANT CHANGE UP (ref 1.6–2.6)
MCHC RBC-ENTMCNC: 31.9 PG — SIGNIFICANT CHANGE UP (ref 27–34)
MCHC RBC-ENTMCNC: 35.2 G/DL — SIGNIFICANT CHANGE UP (ref 32–36)
MCV RBC AUTO: 90.7 FL — SIGNIFICANT CHANGE UP (ref 80–100)
NRBC # BLD AUTO: 0 K/UL — SIGNIFICANT CHANGE UP (ref 0–0)
NRBC # FLD: 0 K/UL — SIGNIFICANT CHANGE UP (ref 0–0)
NRBC BLD AUTO-RTO: 0 /100 WBCS — SIGNIFICANT CHANGE UP (ref 0–0)
PHOSPHATE SERPL-MCNC: 3.5 MG/DL — SIGNIFICANT CHANGE UP (ref 2.5–4.5)
PLATELET # BLD AUTO: 237 K/UL — SIGNIFICANT CHANGE UP (ref 150–400)
POTASSIUM SERPL-MCNC: 4.2 MMOL/L — SIGNIFICANT CHANGE UP (ref 3.5–5.3)
POTASSIUM SERPL-SCNC: 4.2 MMOL/L — SIGNIFICANT CHANGE UP (ref 3.5–5.3)
RBC # BLD: 4.2 M/UL — SIGNIFICANT CHANGE UP (ref 4.2–5.8)
RBC # FLD: 12.7 % — SIGNIFICANT CHANGE UP (ref 10.3–14.5)
SODIUM SERPL-SCNC: 126 MMOL/L — LOW (ref 135–145)
T4 FREE SERPL-MCNC: 1.1 NG/DL — SIGNIFICANT CHANGE UP (ref 0.9–1.8)
TSH SERPL-MCNC: 3.85 UIU/ML — SIGNIFICANT CHANGE UP (ref 0.27–4.2)
WBC # BLD: 7.41 K/UL — SIGNIFICANT CHANGE UP (ref 3.8–10.5)
WBC # FLD AUTO: 7.41 K/UL — SIGNIFICANT CHANGE UP (ref 3.8–10.5)

## 2025-05-04 PROCEDURE — 99232 SBSQ HOSP IP/OBS MODERATE 35: CPT | Mod: GC

## 2025-05-04 PROCEDURE — 99233 SBSQ HOSP IP/OBS HIGH 50: CPT

## 2025-05-04 RX ADMIN — Medication 2 GRAM(S): at 06:29

## 2025-05-04 RX ADMIN — OXYBUTYNIN CHLORIDE 5 MILLIGRAM(S): 5 TABLET, FILM COATED, EXTENDED RELEASE ORAL at 06:29

## 2025-05-04 RX ADMIN — Medication 1 GRAM(S): at 12:44

## 2025-05-04 RX ADMIN — Medication 500 MILLIGRAM(S): at 12:30

## 2025-05-04 RX ADMIN — Medication 2 GRAM(S): at 12:05

## 2025-05-04 RX ADMIN — LATANOPROST PF 1 DROP(S): 0.05 SOLUTION/ DROPS OPHTHALMIC at 21:29

## 2025-05-04 RX ADMIN — Medication 1000 MILLIGRAM(S): at 21:28

## 2025-05-04 RX ADMIN — OXYBUTYNIN CHLORIDE 5 MILLIGRAM(S): 5 TABLET, FILM COATED, EXTENDED RELEASE ORAL at 12:05

## 2025-05-04 RX ADMIN — OXYBUTYNIN CHLORIDE 5 MILLIGRAM(S): 5 TABLET, FILM COATED, EXTENDED RELEASE ORAL at 21:30

## 2025-05-04 RX ADMIN — POLYETHYLENE GLYCOL 3350 17 GRAM(S): 17 POWDER, FOR SOLUTION ORAL at 12:04

## 2025-05-04 RX ADMIN — Medication 1 TABLET(S): at 12:05

## 2025-05-04 RX ADMIN — Medication 10 MILLIGRAM(S): at 12:05

## 2025-05-04 RX ADMIN — OXCARBAZEPINE 600 MILLIGRAM(S): 150 TABLET, FILM COATED ORAL at 08:42

## 2025-05-04 RX ADMIN — FLUTICASONE PROPIONATE 1 SPRAY(S): 50 SPRAY, METERED NASAL at 17:40

## 2025-05-04 RX ADMIN — ENOXAPARIN SODIUM 40 MILLIGRAM(S): 100 INJECTION SUBCUTANEOUS at 06:30

## 2025-05-04 RX ADMIN — Medication 1 PACKET(S): at 17:40

## 2025-05-04 RX ADMIN — FLUTICASONE PROPIONATE 1 SPRAY(S): 50 SPRAY, METERED NASAL at 06:29

## 2025-05-04 RX ADMIN — Medication 1 PACKET(S): at 06:30

## 2025-05-04 RX ADMIN — Medication 10 MILLIGRAM(S): at 21:28

## 2025-05-04 RX ADMIN — Medication 50 MICROGRAM(S): at 06:29

## 2025-05-04 RX ADMIN — Medication 3 GRAM(S): at 21:30

## 2025-05-04 RX ADMIN — OXCARBAZEPINE 1200 MILLIGRAM(S): 150 TABLET, FILM COATED ORAL at 21:28

## 2025-05-04 RX ADMIN — Medication 2 SPRAY(S): at 12:08

## 2025-05-04 RX ADMIN — Medication 3 MILLIGRAM(S): at 21:29
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
## 2025-05-04 NOTE — DIETITIAN INITIAL EVALUATION ADULT - PERTINENT MEDS FT
MEDICATIONS  (STANDING):  cetirizine 10 milliGRAM(s) Oral daily  divalproex DR 1000 milliGRAM(s) Oral at bedtime  divalproex  milliGRAM(s) Oral daily  enoxaparin Injectable 40 milliGRAM(s) SubCutaneous every 24 hours  fluticasone propionate 50 MICROgram(s)/spray Nasal Spray 1 Spray(s) Both Nostrils two times a day  latanoprost 0.005% Ophthalmic Solution 1 Drop(s) Both EYES at bedtime  levothyroxine 50 MICROGram(s) Oral daily  multivitamin 1 Tablet(s) Oral daily  OXcarbazepine 1200 milliGRAM(s) Oral <User Schedule>  OXcarbazepine 600 milliGRAM(s) Oral <User Schedule>  oxybutynin 5 milliGRAM(s) Oral three times a day  polyethylene glycol 3350 17 Gram(s) Oral daily  psyllium Powder 1 Packet(s) Oral two times a day  sodium chloride 2 Gram(s) Oral three times a day  sodium chloride 0.65% Nasal 2 Spray(s) Both Nostrils daily  thiothixene 10 milliGRAM(s) Oral <User Schedule>    MEDICATIONS  (PRN):  acetaminophen     Tablet .. 650 milliGRAM(s) Oral every 6 hours PRN Temp greater or equal to 38C (100.4F), Mild Pain (1 - 3)  aluminum hydroxide/magnesium hydroxide/simethicone Suspension 30 milliLiter(s) Oral every 4 hours PRN Dyspepsia  melatonin 3 milliGRAM(s) Oral at bedtime PRN Insomnia  ondansetron Injectable 4 milliGRAM(s) IV Push every 8 hours PRN Nausea and/or Vomiting

## 2025-05-04 NOTE — DISCHARGE NOTE PROVIDER - NSDCFUADDAPPT_GEN_ALL_CORE_FT
Repeat basic metabolic panel in 3-4 days discuss with you primary care provider or nephrology regarding further salt tab adjustment  Repeat basic metabolic panel in 3-4 days discuss with you primary care provider or nephrology regarding further salt tab adjustment.

## 2025-05-04 NOTE — DIETITIAN INITIAL EVALUATION ADULT - NS FNS DIET ORDER
Diet, Regular:   Easy to Chew (EASYTOCHEW)  1200mL Fluid Restriction ([id number]) (05-03-25 @ 02:23) [Active]

## 2025-05-04 NOTE — DIETITIAN INITIAL EVALUATION ADULT - ORAL INTAKE PTA/DIET HISTORY
Patient unable to provide collateral due to decreased cognition. Group home staff at bedside to provide information. Noted lactose and caffeine allergy/intolerance per chart. Staff is unaware if patient has weight change however reports appetite has been the same. Patient requires set-up help with meal cutting. Patient has been on fluid restriction in the group home. Discourage additional amount of fluid provided as noted patient likes soda at bedside.

## 2025-05-04 NOTE — DIETITIAN INITIAL EVALUATION ADULT - PROBLEM SELECTOR PLAN 2
Home medications: Oxcarbazepine 600 mg 2 tabs at bedtime, Oxcarbazepine 600 mg daily in the AM, Depakote  mg in the AM and Depakote 1000 mg at bedtime   No breakthrough seizures as per group home aide at bedside    Oxcarbazepine   Depakote ER  Follow oxcarbazepine and valproic acid level   Maintain seizure precautions

## 2025-05-04 NOTE — DIETITIAN INITIAL EVALUATION ADULT - PERTINENT LABORATORY DATA
05-04    126[L]  |  93[L]  |  12  ----------------------------<  83  4.2   |  21[L]  |  0.68    Ca    8.7      04 May 2025 06:30  Phos  3.5     05-04  Mg     1.80     05-04    TPro  7.8  /  Alb  4.4  /  TBili  0.2  /  DBili  x   /  AST  27  /  ALT  13  /  AlkPhos  111  05-02

## 2025-05-04 NOTE — DISCHARGE NOTE PROVIDER - NSDCCPCAREPLAN_GEN_ALL_CORE_FT
PRINCIPAL DISCHARGE DIAGNOSIS  Diagnosis: Hyponatremia  Assessment and Plan of Treatment: We treated you with IV fluids and have now increased your sodium tablets. Please recheck labs in 3-4 days      SECONDARY DISCHARGE DIAGNOSES  Diagnosis: Recurrent vomiting  Assessment and Plan of Treatment: This has now resolved. we did a CT abd/pelv which was reassuring. You are now tolerating oral intake

## 2025-05-04 NOTE — DIETITIAN INITIAL EVALUATION ADULT - OTHER INFO
Patient met at bedside for nutrition consult. Noted diet order: easy to chew on fluid restriction. Patient requires food cutting per group home staff, present diet consistency remains appropriate. Reports adequate PO intake with good appetite in house. No GI distress reported since admission, vomits is likely related to hyponatremia. Patient has ordered for bowel regimen (polyethylene glycol); noted on ondansetron PRN. No last BM noted per RN flow sheets. Labs notable for hyponatremia 126 on NaCl 2gm tab TID. Dosing weight is 63.5kg (5/2), noted a questionable weight accuracy of 72.6kg (2/25/25), and 68.5kg (11/14/24)- suggestive of 5kg/7.2% loss in 6 months per [facility] record. Weight change is likely related to scale difference as patient noted frequently moving on the bed during visit with Autism. Education deferred at this time due to impaired cognition.

## 2025-05-04 NOTE — DISCHARGE NOTE PROVIDER - NSFOLLOWUPCLINICS_GEN_ALL_ED_FT
[facility]  Orthopedic Surgery  [address] [address]  Phone: [phone]  Fax:   Follow Up Time: Routine

## 2025-05-04 NOTE — DISCHARGE NOTE PROVIDER - CARE PROVIDER_API CALL
Lisandra Burger  Nephrology  [address] [place] [id number]  Phone: [phone]  Fax: [phone]  Follow Up Time:

## 2025-05-04 NOTE — DIETITIAN INITIAL EVALUATION ADULT - PROBLEM SELECTOR PLAN 5
Hx of chronic constipation  Home medications: Colace 200 mg daily, Kristalose 10 g oral powder daily, Reguloid 400 mg 2 caps daily     Psyllium powder  Miralax daily

## 2025-05-04 NOTE — DISCHARGE NOTE PROVIDER - HOSPITAL COURSE
Hyponatremia    Came in with Na of 118  Likely 2/2 to SIADH due to medications  started on salt tabs/fluid restriction  Improved to 126  Nephro following  Continue to monitor     HPI:  Further information obtained from Aide at bedside who has taken care of the patient intermittently for the past 3 years.   34 y/o M with PMHx of severe intellectual disability, autism c/b developmental delay, hypothyroidism, seizure disorder, lead poisoning, chronic constipation, enuresis, and chronic hyponatremia presented to the ED due to acute onset of vomiting which started around noon on 05/02/2025. In the AM patient was eating his breakfast and had multiple episodes of vomiting and felt weak. The emesis was NBNB with food particles. In the ED noted to have a sodium level of 118 and received 100 cc of 3% HTS and repeat labs demonstrated Na of 121.  Patient has a history of chronic hyponatremia and was previously admitted for similar complaint in 11/2021. He is on NaCl tablets 1 gm 4 times/day and on fluid restriction at the group home and as per aide at bedside patient has been compliant with medications. As per Aide patient can interact and converse with people at baseline. His current mental status is not his baseline and he is more confused as per Aide. At the group home he is able to ambulate with no assistance but has an unsteady gait.  (03 May 2025 01:25)    Hospital Course:  Nephrology was consulted-likely this is acute on chronic hyponatremia in the setting of decreased oral intake and vomiting. Chronic hyponatremia is attributed to his home medication of oxcarbazepime.  Pt was given 3% HTS with good response.  Pt's thyroid was checked WNL Pt's sodium supplementation was increased to 3g TID. Pt's sodium was monitored and trending upward. No further episodes of vomiting. CT abd/pelv was done to eval vomiting and was grossly negative with incidental fiding of sclerotic changes on L4 vertebrae. stable for discharge      Important Medication Changes and Reason:  increase salt tablets from 1gm QID to sodium chloride 3gm TID  Active or Pending Issues Requiring Follow-up:  Please recheck BMP in 3-4 days  please f/u with ortho non-urgently for sclerotic change    Advanced Directives:   [x ] Full code  [ ] DNR  [ ] Hospice    Discharge Diagnoses:  Acute on chronic hyponatremia  Autism with developmental delay  Chronic constipation  hypothyroidism  seizure disorder    IMPRESSION:  No CT findings to explain patient's vomiting.    Since 11/25/2021, new sclerotic deformity of the L4 vertebral body, may   represent sequelae of interval compression fracture with posttraumatic   advanced degenerative changes and superior endplate Schmorl's node.    Rae Cazares MD  >33 min have been spent in the care and coordination of this patient's care

## 2025-05-04 NOTE — DIETITIAN INITIAL EVALUATION ADULT - PROBLEM SELECTOR PLAN 1
Presented to the ED due to vomiting and change in mental status   On arrival had Na of 118 and received 100 cc of 3% HTS with improvement to 121  Nephrology consulted and provided recommendations  Home medications: NaCl 1 gm 4 times/day     Acute on chronic hyponatremia possibly due to SIADH due to medications (Oxcarbazepine and Depakote ER) vs decrease in PO intake     Monitor Na  Avoid overcorrection - goal is to increase Na by 6-8 mEq/L in 24 hours   Hold home dose of NaCl tablets until discussed with Nephrology   Fluid restriction   Strict Is/Os   Nephrology recommendations appreciated

## 2025-05-04 NOTE — DISCHARGE NOTE PROVIDER - NSDCMRMEDTOKEN_GEN_ALL_CORE_FT
Centrum Silver oral tablet: 1 tab(s) orally once a day  Claritin 10 mg oral tablet: 1 tab(s) orally once a day  Colace 100 mg oral capsule: 2 cap(s) orally once a day  Depakote  mg oral tablet, extended release: 1 tab(s) orally once a day (in the morning)  divalproex sodium 500 mg oral delayed release tablet: 2 tab(s) orally once a day (at bedtime)  fluticasone 50 mcg/inh nasal spray: 1 spray(s) in each nostril once a day  Kristalose 10 g oral powder for reconstitution: 1 gram(s) orally once a day  lactase 3000 units oral tablet: 3 tab(s) orally once a day  latanoprost 0.005% ophthalmic solution: 1 drop(s) in each eye once a day (at bedtime)  levothyroxine 50 mcg (0.05 mg) oral tablet: 1 tab(s) orally once a day (in the morning)  OXcarbazepine 600 mg oral tablet: 1 tab(s) orally once a day (in the morning)  OXcarbazepine 600 mg oral tablet: 2 tab(s) orally once a day (at bedtime)  oxyBUTYnin 5 mg oral tablet: 1 tab(s) orally 3 times a day  Reguloid 400 mg oral capsule: 2 cap(s) orally once a day 0.4G w/ 8oz water  Saline Mist 0.65% nasal spray: 2 spray(s) nasal once a day  sodium chloride: 1 gram(s) orally 4 times a day  thiothixene 5 mg oral capsule: 2 cap(s) orally once a day (at bedtime)  Tylenol 325 mg oral tablet: 2 tab(s) orally every 4 hours as needed for pain   Centrum Silver oral tablet: 1 tab(s) orally once a day  Claritin 10 mg oral tablet: 1 tab(s) orally once a day  Colace 100 mg oral capsule: 2 cap(s) orally once a day  Depakote  mg oral tablet, extended release: 1 tab(s) orally once a day (in the morning)  divalproex sodium 500 mg oral delayed release tablet: 2 tab(s) orally once a day (at bedtime)  fluticasone 50 mcg/inh nasal spray: 1 spray(s) in each nostril once a day  Kristalose 10 g oral powder for reconstitution: 1 gram(s) orally once a day  lactase 3000 units oral tablet: 3 tab(s) orally once a day  latanoprost 0.005% ophthalmic solution: 1 drop(s) in each eye once a day (at bedtime)  levothyroxine 50 mcg (0.05 mg) oral tablet: 1 tab(s) orally once a day (in the morning)  OXcarbazepine 600 mg oral tablet: 1 tab(s) orally once a day (in the morning)  OXcarbazepine 600 mg oral tablet: 2 tab(s) orally once a day (at bedtime)  oxyBUTYnin 5 mg oral tablet: 1 tab(s) orally 3 times a day  Reguloid 400 mg oral capsule: 2 cap(s) orally once a day 0.4G w/ 8oz water  Saline Mist 0.65% nasal spray: 2 spray(s) nasal once a day  sodium chloride 1 g oral tablet: 3 tab(s) orally 3 times a day  thiothixene 5 mg oral capsule: 2 cap(s) orally once a day (at bedtime)  Tylenol 325 mg oral tablet: 2 tab(s) orally every 4 hours as needed for pain

## 2025-05-04 NOTE — DIETITIAN INITIAL EVALUATION ADULT - REASON FOR ADMISSION
Hyponatremia  Per chart review, patient is a 34 y/o M with PMHx of severe intellectual disability, autism c/b developmental delay, hypothyroidism, seizure disorder, lead poisoning, chronic constipation, enuresis, and chronic hyponatremia presented to the ED due to acute onset of vomiting which started around noon on 05/02/2025. In the ED noted to have a sodium level of 118 and received 100 cc of 3% HTS and repeat labs demonstrated Na of 121. Nephrology was consulted and recommended fluid restriction, monitor Na and to avoid overcorrection. He will be admitted for management of hyponatremia.

## 2025-05-04 NOTE — DIETITIAN INITIAL EVALUATION ADULT - PROBLEM SELECTOR PROBLEM 1
Carac Counseling:  I discussed with the patient the risks of Carac including but not limited to erythema, scaling, itching, weeping, crusting, and pain. Hyponatremia

## 2025-05-04 NOTE — DIETITIAN INITIAL EVALUATION ADULT - ADD RECOMMEND
1. Continue current PO diet order as prescribed. Fluid per medical discretion.  2. Please consistently document % PO intake, weekly weights, BMs, skin integrity in RN flow sheets. RD to follow up per protocol.

## 2025-05-05 LAB
ANION GAP SERPL CALC-SCNC: 11 MMOL/L — SIGNIFICANT CHANGE UP (ref 7–14)
BUN SERPL-MCNC: 12 MG/DL — SIGNIFICANT CHANGE UP (ref 7–23)
CALCIUM SERPL-MCNC: 8.8 MG/DL — SIGNIFICANT CHANGE UP (ref 8.4–10.5)
CHLORIDE SERPL-SCNC: 94 MMOL/L — LOW (ref 98–107)
CO2 SERPL-SCNC: 24 MMOL/L — SIGNIFICANT CHANGE UP (ref 22–31)
CREAT SERPL-MCNC: 0.56 MG/DL — SIGNIFICANT CHANGE UP (ref 0.5–1.3)
EGFR: 132 ML/MIN/1.73M2 — SIGNIFICANT CHANGE UP
EGFR: 132 ML/MIN/1.73M2 — SIGNIFICANT CHANGE UP
GLUCOSE SERPL-MCNC: 81 MG/DL — SIGNIFICANT CHANGE UP (ref 70–99)
HCT VFR BLD CALC: 38 % — LOW (ref 39–50)
HGB BLD-MCNC: 13.4 G/DL — SIGNIFICANT CHANGE UP (ref 13–17)
MAGNESIUM SERPL-MCNC: 1.7 MG/DL — SIGNIFICANT CHANGE UP (ref 1.6–2.6)
MCHC RBC-ENTMCNC: 31.9 PG — SIGNIFICANT CHANGE UP (ref 27–34)
MCHC RBC-ENTMCNC: 35.3 G/DL — SIGNIFICANT CHANGE UP (ref 32–36)
MCV RBC AUTO: 90.5 FL — SIGNIFICANT CHANGE UP (ref 80–100)
NRBC # BLD AUTO: 0 K/UL — SIGNIFICANT CHANGE UP (ref 0–0)
NRBC # FLD: 0 K/UL — SIGNIFICANT CHANGE UP (ref 0–0)
NRBC BLD AUTO-RTO: 0 /100 WBCS — SIGNIFICANT CHANGE UP (ref 0–0)
PHOSPHATE SERPL-MCNC: 3.8 MG/DL — SIGNIFICANT CHANGE UP (ref 2.5–4.5)
PLATELET # BLD AUTO: 231 K/UL — SIGNIFICANT CHANGE UP (ref 150–400)
POTASSIUM SERPL-MCNC: 4 MMOL/L — SIGNIFICANT CHANGE UP (ref 3.5–5.3)
POTASSIUM SERPL-SCNC: 4 MMOL/L — SIGNIFICANT CHANGE UP (ref 3.5–5.3)
RBC # BLD: 4.2 M/UL — SIGNIFICANT CHANGE UP (ref 4.2–5.8)
RBC # FLD: 12.4 % — SIGNIFICANT CHANGE UP (ref 10.3–14.5)
SODIUM SERPL-SCNC: 129 MMOL/L — LOW (ref 135–145)
WBC # BLD: 6.23 K/UL — SIGNIFICANT CHANGE UP (ref 3.8–10.5)
WBC # FLD AUTO: 6.23 K/UL — SIGNIFICANT CHANGE UP (ref 3.8–10.5)

## 2025-05-05 PROCEDURE — 99239 HOSP IP/OBS DSCHRG MGMT >30: CPT

## 2025-05-05 PROCEDURE — 99232 SBSQ HOSP IP/OBS MODERATE 35: CPT

## 2025-05-05 RX ADMIN — Medication 1 PACKET(S): at 05:29

## 2025-05-05 RX ADMIN — Medication 3 GRAM(S): at 21:41

## 2025-05-05 RX ADMIN — OXCARBAZEPINE 600 MILLIGRAM(S): 150 TABLET, FILM COATED ORAL at 09:06

## 2025-05-05 RX ADMIN — ENOXAPARIN SODIUM 40 MILLIGRAM(S): 100 INJECTION SUBCUTANEOUS at 05:29

## 2025-05-05 RX ADMIN — FLUTICASONE PROPIONATE 1 SPRAY(S): 50 SPRAY, METERED NASAL at 17:11

## 2025-05-05 RX ADMIN — Medication 10 MILLIGRAM(S): at 14:36

## 2025-05-05 RX ADMIN — Medication 10 MILLIGRAM(S): at 20:45

## 2025-05-05 RX ADMIN — OXYBUTYNIN CHLORIDE 5 MILLIGRAM(S): 5 TABLET, FILM COATED, EXTENDED RELEASE ORAL at 05:29

## 2025-05-05 RX ADMIN — Medication 50 MICROGRAM(S): at 05:46

## 2025-05-05 RX ADMIN — OXCARBAZEPINE 1200 MILLIGRAM(S): 150 TABLET, FILM COATED ORAL at 20:45

## 2025-05-05 RX ADMIN — Medication 1 TABLET(S): at 14:36

## 2025-05-05 RX ADMIN — LATANOPROST PF 1 DROP(S): 0.05 SOLUTION/ DROPS OPHTHALMIC at 21:49

## 2025-05-05 RX ADMIN — OXYBUTYNIN CHLORIDE 5 MILLIGRAM(S): 5 TABLET, FILM COATED, EXTENDED RELEASE ORAL at 21:41

## 2025-05-05 RX ADMIN — Medication 2 SPRAY(S): at 14:36

## 2025-05-05 RX ADMIN — POLYETHYLENE GLYCOL 3350 17 GRAM(S): 17 POWDER, FOR SOLUTION ORAL at 14:37

## 2025-05-05 RX ADMIN — Medication 3 MILLIGRAM(S): at 21:41

## 2025-05-05 RX ADMIN — Medication 500 MILLIGRAM(S): at 14:36

## 2025-05-05 RX ADMIN — Medication 1 PACKET(S): at 17:11

## 2025-05-05 RX ADMIN — OXYBUTYNIN CHLORIDE 5 MILLIGRAM(S): 5 TABLET, FILM COATED, EXTENDED RELEASE ORAL at 14:36

## 2025-05-05 RX ADMIN — Medication 3 GRAM(S): at 14:37

## 2025-05-05 RX ADMIN — Medication 3 GRAM(S): at 05:29

## 2025-05-05 RX ADMIN — Medication 1000 MILLIGRAM(S): at 21:41

## 2025-05-05 NOTE — CHART NOTE - NSCHARTNOTEFT_GEN_A_CORE
Discussed with renal Dr Mcgrath pt can continue salt tab 3g TID repeat lab work in 3-4 days outpatient to adjust salt tab. Clear from renal standpoint

## 2025-05-06 ENCOUNTER — TRANSCRIPTION ENCOUNTER (OUTPATIENT)
Age: 35
End: 2025-05-06

## 2025-05-06 VITALS
HEART RATE: 78 BPM | DIASTOLIC BLOOD PRESSURE: 62 MMHG | TEMPERATURE: 98 F | SYSTOLIC BLOOD PRESSURE: 117 MMHG | OXYGEN SATURATION: 97 % | RESPIRATION RATE: 17 BRPM

## 2025-05-06 LAB
ANION GAP SERPL CALC-SCNC: 13 MMOL/L — SIGNIFICANT CHANGE UP (ref 7–14)
BUN SERPL-MCNC: 14 MG/DL — SIGNIFICANT CHANGE UP (ref 7–23)
CALCIUM SERPL-MCNC: 8.5 MG/DL — SIGNIFICANT CHANGE UP (ref 8.4–10.5)
CHLORIDE SERPL-SCNC: 93 MMOL/L — LOW (ref 98–107)
CO2 SERPL-SCNC: 23 MMOL/L — SIGNIFICANT CHANGE UP (ref 22–31)
CREAT SERPL-MCNC: 0.67 MG/DL — SIGNIFICANT CHANGE UP (ref 0.5–1.3)
EGFR: 125 ML/MIN/1.73M2 — SIGNIFICANT CHANGE UP
EGFR: 125 ML/MIN/1.73M2 — SIGNIFICANT CHANGE UP
GLUCOSE SERPL-MCNC: 85 MG/DL — SIGNIFICANT CHANGE UP (ref 70–99)
HCT VFR BLD CALC: 36.8 % — LOW (ref 39–50)
HGB BLD-MCNC: 12.9 G/DL — LOW (ref 13–17)
MAGNESIUM SERPL-MCNC: 1.7 MG/DL — SIGNIFICANT CHANGE UP (ref 1.6–2.6)
MCHC RBC-ENTMCNC: 31.9 PG — SIGNIFICANT CHANGE UP (ref 27–34)
MCHC RBC-ENTMCNC: 35.1 G/DL — SIGNIFICANT CHANGE UP (ref 32–36)
MCV RBC AUTO: 90.9 FL — SIGNIFICANT CHANGE UP (ref 80–100)
NRBC # BLD AUTO: 0 K/UL — SIGNIFICANT CHANGE UP (ref 0–0)
NRBC # FLD: 0 K/UL — SIGNIFICANT CHANGE UP (ref 0–0)
NRBC BLD AUTO-RTO: 0 /100 WBCS — SIGNIFICANT CHANGE UP (ref 0–0)
OXCARBAZEPINE SERPL-MCNC: 14 UG/ML — SIGNIFICANT CHANGE UP (ref 10–35)
PHOSPHATE SERPL-MCNC: 3.8 MG/DL — SIGNIFICANT CHANGE UP (ref 2.5–4.5)
PLATELET # BLD AUTO: 246 K/UL — SIGNIFICANT CHANGE UP (ref 150–400)
POTASSIUM SERPL-MCNC: 4.3 MMOL/L — SIGNIFICANT CHANGE UP (ref 3.5–5.3)
POTASSIUM SERPL-SCNC: 4.3 MMOL/L — SIGNIFICANT CHANGE UP (ref 3.5–5.3)
RBC # BLD: 4.05 M/UL — LOW (ref 4.2–5.8)
RBC # FLD: 12.4 % — SIGNIFICANT CHANGE UP (ref 10.3–14.5)
SODIUM SERPL-SCNC: 129 MMOL/L — LOW (ref 135–145)
WBC # BLD: 8.24 K/UL — SIGNIFICANT CHANGE UP (ref 3.8–10.5)
WBC # FLD AUTO: 8.24 K/UL — SIGNIFICANT CHANGE UP (ref 3.8–10.5)

## 2025-05-06 PROCEDURE — 99239 HOSP IP/OBS DSCHRG MGMT >30: CPT

## 2025-05-06 RX ADMIN — POLYETHYLENE GLYCOL 3350 17 GRAM(S): 17 POWDER, FOR SOLUTION ORAL at 14:22

## 2025-05-06 RX ADMIN — FLUTICASONE PROPIONATE 1 SPRAY(S): 50 SPRAY, METERED NASAL at 06:44

## 2025-05-06 RX ADMIN — Medication 3 GRAM(S): at 06:41

## 2025-05-06 RX ADMIN — Medication 50 MICROGRAM(S): at 06:42

## 2025-05-06 RX ADMIN — Medication 1 TABLET(S): at 14:22

## 2025-05-06 RX ADMIN — Medication 3 GRAM(S): at 14:22

## 2025-05-06 RX ADMIN — ENOXAPARIN SODIUM 40 MILLIGRAM(S): 100 INJECTION SUBCUTANEOUS at 06:40

## 2025-05-06 RX ADMIN — Medication 2 SPRAY(S): at 14:21

## 2025-05-06 RX ADMIN — Medication 10 MILLIGRAM(S): at 14:22

## 2025-05-06 RX ADMIN — OXYBUTYNIN CHLORIDE 5 MILLIGRAM(S): 5 TABLET, FILM COATED, EXTENDED RELEASE ORAL at 06:41

## 2025-05-06 RX ADMIN — OXYBUTYNIN CHLORIDE 5 MILLIGRAM(S): 5 TABLET, FILM COATED, EXTENDED RELEASE ORAL at 14:22

## 2025-05-06 RX ADMIN — OXCARBAZEPINE 600 MILLIGRAM(S): 150 TABLET, FILM COATED ORAL at 08:14

## 2025-05-06 RX ADMIN — Medication 1 PACKET(S): at 06:40

## 2025-05-06 RX ADMIN — Medication 500 MILLIGRAM(S): at 14:22
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
## 2025-05-06 NOTE — DISCHARGE NOTE NURSING/CASE MANAGEMENT/SOCIAL WORK - PATIENT PORTAL LINK FT
You can access the FollowMyHealth Patient Portal offered by [facility] by registering at the following website: [email or web address]. By joining [employer]’s FollowMyHealth portal, you will also be able to view your health information using other applications (apps) compatible with our system.

## 2025-05-06 NOTE — PROGRESS NOTE ADULT - ASSESSMENT
35 y.o. M w/ PMHx Autism c/b developmental delay, severe MR, hypothyroidism, seizures, and chronic hyponatremia who presented to the ED for acute onset of vomiting and dizziness. Nephrology consulted for severe acute on chronic hyponatremia.
36 y/o M with PMHx of severe intellectual disability, autism c/b developmental delay, hypothyroidism, seizure disorder, lead poisoning, chronic constipation, enuresis, and chronic hyponatremia presented to the ED due to acute onset of vomiting which started around noon on 05/02/2025. In the ED noted to have a sodium level of 118 and received 100 cc of 3% HTS and repeat labs demonstrated Na of 121. Nephrology was consulted and recommended fluid restriction, monitor Na and to avoid overcorrection. He will be admitted for management of hyponatremia now improving
36 y/o M with PMHx of severe intellectual disability, autism c/b developmental delay, hypothyroidism, seizure disorder, lead poisoning, chronic constipation, enuresis, and chronic hyponatremia presented to the ED due to acute onset of vomiting which started around noon on 05/02/2025. In the ED noted to have a sodium level of 118 and received 100 cc of 3% HTS and repeat labs demonstrated Na of 121. Nephrology was consulted and recommended fluid restriction, monitor Na and to avoid overcorrection. He will be admitted for management of hyponatremia now improving
36 y/o M with PMHx of severe intellectual disability, autism c/b developmental delay, hypothyroidism, seizure disorder, lead poisoning, chronic constipation, enuresis, and chronic hyponatremia presented to the ED due to acute onset of vomiting which started around noon on 05/02/2025. In the ED noted to have a sodium level of 118 and received 100 cc of 3% HTS and repeat labs demonstrated Na of 121. Nephrology was consulted and recommended fluid restriction, monitor Na and to avoid overcorrection. He will be admitted for management of hyponatremia.
34 y/o M with PMHx of severe intellectual disability, autism c/b developmental delay, hypothyroidism, seizure disorder, lead poisoning, chronic constipation, enuresis, and chronic hyponatremia presented to the ED due to acute onset of vomiting which started around noon on 05/02/2025. In the ED noted to have a sodium level of 118 and received 100 cc of 3% HTS and repeat labs demonstrated Na of 121. Nephrology was consulted and recommended fluid restriction, monitor Na and to avoid overcorrection. He will be admitted for management of hyponatremia

## 2025-05-06 NOTE — PROGRESS NOTE ADULT - PROBLEM SELECTOR PROBLEM 4
Severe intellectual disability

## 2025-05-06 NOTE — PROGRESS NOTE ADULT - PROBLEM SELECTOR PLAN 3
Levothyroxine 50 mcg daily.  TSH 1.17, Free T4 1.0
Levothyroxine 50 mcg daily.  TSH 1.17, Free T4 1.0
Levothyroxine 50 mcg daily  TSH 1.17, Free T4 1.0
Levothyroxine 50 mcg daily.  TSH 1.17, Free T4 1.0

## 2025-05-06 NOTE — DISCHARGE NOTE NURSING/CASE MANAGEMENT/SOCIAL WORK - FINANCIAL ASSISTANCE
[facility] provides services at a reduced cost to those who are determined to be eligible through [facility]’s financial assistance program. Information regarding [facility]’s financial assistance program can be found by going to [email or web address] or by calling [phone].

## 2025-05-06 NOTE — DISCHARGE NOTE NURSING/CASE MANAGEMENT/SOCIAL WORK - NSDCFUADDAPPT_GEN_ALL_CORE_FT
Repeat basic metabolic panel in 3-4 days discuss with you primary care provider or nephrology regarding further salt tab adjustment.

## 2025-05-06 NOTE — PROGRESS NOTE ADULT - PROBLEM SELECTOR PLAN 2
Home medications: Oxcarbazepine 600 mg 2 tabs at bedtime, Oxcarbazepine 600 mg daily in the AM, Depakote  mg in the AM and Depakote 1000 mg at bedtime   No breakthrough seizures as per group home aide at bedside    Oxcarbazepine   Depakote ER  Follow oxcarbazepine and valproic acid level   Maintain seizure precautions
Home medications: Oxcarbazepine 600 mg 2 tabs at bedtime, Oxcarbazepine 600 mg daily in the AM, Depakote  mg in the AM and Depakote 1000 mg at bedtime   No breakthrough seizures as per group home aide at bedside    Oxcarbazepine   Depakote ER  Follow oxcarbazepine and valproic acid level   Maintain seizure precautions.
Home medications: Oxcarbazepine 600 mg 2 tabs at bedtime, Oxcarbazepine 600 mg daily in the AM, Depakote  mg in the AM and Depakote 1000 mg at bedtime   No breakthrough seizures as per group home aide at bedside    Oxcarbazepine   Depakote ER  Follow oxcarbazepine and valproic acid level   Maintain seizure precautions
Home medications: Oxcarbazepine 600 mg 2 tabs at bedtime, Oxcarbazepine 600 mg daily in the AM, Depakote  mg in the AM and Depakote 1000 mg at bedtime   No breakthrough seizures as per group home aide at bedside    Oxcarbazepine   Depakote ER  Follow oxcarbazepine and valproic acid level   Maintain seizure precautions

## 2025-05-06 NOTE — PROGRESS NOTE ADULT - PROBLEM SELECTOR PLAN 5
Hx of chronic constipation  Home medications: Colace 200 mg daily, Kristalose 10 g oral powder daily, Reguloid 400 mg 2 caps daily     Psyllium powder  Miralax daily
Hx of chronic constipation  Home medications: Colace 200 mg daily, Kristalose 10 g oral powder daily, Reguloid 400 mg 2 caps daily     Psyllium powder  Miralax daily.
Hx of chronic constipation  Home medications: Colace 200 mg daily, Kristalose 10 g oral powder daily, Reguloid 400 mg 2 caps daily     Psyllium powder  Miralax daily
Hx of chronic constipation  Home medications: Colace 200 mg daily, Kristalose 10 g oral powder daily, Reguloid 400 mg 2 caps daily     Psyllium powder  Miralax daily

## 2025-05-06 NOTE — PROGRESS NOTE ADULT - SUBJECTIVE AND OBJECTIVE BOX
[facility] DIVISION OF KIDNEY DISEASES AND HYPERTENSION   FOLLOW UP NOTE    --------------------------------------------------------------------------------  Chief Complaint:    24 hour events/subjective: Pt. was seen and examined today.       PAST HISTORY  --------------------------------------------------------------------------------  No significant changes to PMH, PSH, FHx, SHx, unless otherwise noted    ALLERGIES & MEDICATIONS  --------------------------------------------------------------------------------  Allergies    lactose hydrous, xanthine derivatives, caffeine (Unknown)  caffeine (Other)  xanthines (Unknown)    Intolerances    lactose (Diarrhea)    Standing Inpatient Medications  cetirizine 10 milliGRAM(s) Oral daily  divalproex DR 1000 milliGRAM(s) Oral at bedtime  divalproex  milliGRAM(s) Oral daily  enoxaparin Injectable 40 milliGRAM(s) SubCutaneous every 24 hours  fluticasone propionate 50 MICROgram(s)/spray Nasal Spray 1 Spray(s) Both Nostrils two times a day  latanoprost 0.005% Ophthalmic Solution 1 Drop(s) Both EYES at bedtime  levothyroxine 50 MICROGram(s) Oral daily  multivitamin 1 Tablet(s) Oral daily  OXcarbazepine 1200 milliGRAM(s) Oral <User Schedule>  OXcarbazepine 600 milliGRAM(s) Oral <User Schedule>  oxybutynin 5 milliGRAM(s) Oral three times a day  polyethylene glycol 3350 17 Gram(s) Oral daily  psyllium Powder 1 Packet(s) Oral two times a day  sodium chloride 2 Gram(s) Oral three times a day  sodium chloride 0.65% Nasal 2 Spray(s) Both Nostrils daily  thiothixene 10 milliGRAM(s) Oral <User Schedule>    PRN Inpatient Medications  acetaminophen     Tablet .. 650 milliGRAM(s) Oral every 6 hours PRN  aluminum hydroxide/magnesium hydroxide/simethicone Suspension 30 milliLiter(s) Oral every 4 hours PRN  melatonin 3 milliGRAM(s) Oral at bedtime PRN  ondansetron Injectable 4 milliGRAM(s) IV Push every 8 hours PRN      REVIEW OF SYSTEMS  --------------------------------------------------------------------------------  unable to obtain due to clinic status      VITALS/PHYSICAL EXAM  --------------------------------------------------------------------------------  T(C): 36.2 (05-04-25 @ 11:01), Max: 36.8 (05-03-25 @ 15:18)  HR: 60 (05-04-25 @ 11:01) (60 - 74)  BP: 128/84 (05-04-25 @ 11:01) (102/55 - 128/84)  RR: 18 (05-04-25 @ 11:01) (18 - 18)  SpO2: 99% (05-04-25 @ 11:01) (97% - 100%)  Wt(kg): --  Height (cm): 165.1 (05-02-25 @ 13:33)  Weight (kg): 63.5 (05-02-25 @ 13:33)  BMI (kg/m2): 23.3 (05-02-25 @ 13:33)  BSA (m2): 1.7 (05-02-25 @ 13:33)        Physical Exam:  Gen: NAD  HEENT: MMM  Pulm: CTA B/L  CV: S1S2  Abd: Soft, +BS   Ext: No LE edema B/L  Neuro: Resting  Skin: Warm and dry      LABS/STUDIES  --------------------------------------------------------------------------------              13.4   7.41  >-----------<  237      [05-04-25 @ 06:30]              38.1     126  |  93  |  12  ----------------------------<  83      [05-04-25 @ 06:30]  4.2   |  21  |  0.68        Ca     8.7     [05-04-25 @ 06:30]      Mg     1.80     [05-04-25 @ 06:30]      Phos  3.5     [05-04-25 @ 06:30]    TPro  7.8  /  Alb  4.4  /  TBili  0.2  /  DBili  x   /  AST  27  /  ALT  13  /  AlkPhos  111  [05-02-25 @ 15:47]          Creatinine Trend:  SCr 0.68 [05-04 @ 06:30]  SCr 0.64 [05-03 @ 20:10]  SCr 0.45 [05-03 @ 10:15]  SCr 0.45 [05-02 @ 23:31]  SCr 0.43 [05-02 @ 15:47]    Urinalysis - [05-04-25 @ 06:30]      Color  / Appearance  / SG  / pH       Gluc 83 / Ketone   / Bili  / Urobili        Blood  / Protein  / Leuk Est  / Nitrite       RBC  / WBC  / Hyaline  / Gran  / Sq Epi  / Non Sq Epi  / Bacteria     Urine Creatinine 53      [05-02-25 @ 23:30]  Urine Protein 37      [05-02-25 @ 23:30]  Urine Sodium 182      [05-02-25 @ 23:30]  Urine Urea Nitrogen 360.5      [05-02-25 @ 23:30]  Urine Potassium 51.4      [05-02-25 @ 23:30]  Urine Osmolality 632      [05-02-25 @ 23:30]        Tacrolimus  Cyclosporine  Sirolimus  Mycophenolate  BK PCR  CMV PCR  Parvo PCR  EBV PCR
Rae Cazares MD   [facility] Medicine  Teams preferred  Pager: [phone]    Patient is a 35y old  Male who presents with a chief complaint of Hyponatremia  Per chart review, patient is a 36 y/o M with PMHx of severe intellectual disability, autism c/b developmental delay, hypothyroidism, seizure disorder, lead poisoning, chronic constipation, enuresis, and chronic hyponatremia presented to the ED due to acute onset of vomiting which started around noon on 05/02/2025. In the ED noted to have a sodium level of 118 and received 100 cc of 3% HTS and repeat labs demonstrated Na of 121. Nephrology was consulted and recommended fluid restriction, monitor Na and to avoid overcorrection. He will be admitted for management of hyponatremia. (04 May 2025 12:10)      INTERVAL HPI/OVERNIGHT EVENTS: No issues overnight. Pt taking sodium chloride well. sodium slowly increasing    MEDICATIONS  (STANDING):  cetirizine 10 milliGRAM(s) Oral daily  divalproex DR 1000 milliGRAM(s) Oral at bedtime  divalproex  milliGRAM(s) Oral daily  enoxaparin Injectable 40 milliGRAM(s) SubCutaneous every 24 hours  fluticasone propionate 50 MICROgram(s)/spray Nasal Spray 1 Spray(s) Both Nostrils two times a day  latanoprost 0.005% Ophthalmic Solution 1 Drop(s) Both EYES at bedtime  levothyroxine 50 MICROGram(s) Oral daily  multivitamin 1 Tablet(s) Oral daily  OXcarbazepine 1200 milliGRAM(s) Oral <User Schedule>  OXcarbazepine 600 milliGRAM(s) Oral <User Schedule>  oxybutynin 5 milliGRAM(s) Oral three times a day  polyethylene glycol 3350 17 Gram(s) Oral daily  psyllium Powder 1 Packet(s) Oral two times a day  sodium chloride 3 Gram(s) Oral three times a day  sodium chloride 0.65% Nasal 2 Spray(s) Both Nostrils daily  thiothixene 10 milliGRAM(s) Oral <User Schedule>    MEDICATIONS  (PRN):  acetaminophen     Tablet .. 650 milliGRAM(s) Oral every 6 hours PRN Temp greater or equal to 38C (100.4F), Mild Pain (1 - 3)  aluminum hydroxide/magnesium hydroxide/simethicone Suspension 30 milliLiter(s) Oral every 4 hours PRN Dyspepsia  melatonin 3 milliGRAM(s) Oral at bedtime PRN Insomnia  ondansetron Injectable 4 milliGRAM(s) IV Push every 8 hours PRN Nausea and/or Vomiting      Allergies    lactose hydrous, xanthine derivatives, caffeine (Unknown)  caffeine (Other)  xanthines (Unknown)    Intolerances    lactose (Diarrhea)      REVIEW OF SYSTEMS:  Please see interval HPI:    I&O's Detail        PHYSICAL EXAM:  Vital Signs Last 24 Hrs  T(C): 36.4 (05 May 2025 13:45), Max: 36.5 (04 May 2025 20:54)  T(F): 97.6 (05 May 2025 13:45), Max: 97.7 (04 May 2025 20:54)  HR: 82 (05 May 2025 13:45) (82 - 98)  BP: 102/50 (05 May 2025 13:45) (102/50 - 124/79)  BP(mean): --  RR: 18 (05 May 2025 13:45) (18 - 18)  SpO2: 95% (05 May 2025 13:45) (95% - 100%)    Parameters below as of 05 May 2025 13:45  Patient On (Oxygen Delivery Method): room air      CONSTITUTIONAL: NAD,   ENMT: Moist oral mucosa,   RESPIRATORY: Normal respiratory effort; lungs are clear to auscultation bilaterally  CARDIOVASCULAR: Regular rate and rhythm, normal S1 and S2, no murmur/rub/gallop; No lower extremity edema;   ABDOMEN: Nontender to palpation, normoactive bowel sounds, no rebound/guarding; No hepatosplenomegaly  EXT: no edema b/l  NEUROLOGY: alert, following commands  SKIN: No rashes; no palpable lesions      LABS:                        13.4   6.23  )-----------( 231      ( 05 May 2025 05:35 )             38.0     05 May 2025 05:35    129    |  94     |  12     ----------------------------<  81     4.0     |  24     |  0.56     Ca    8.8        05 May 2025 05:35  Phos  3.8       05 May 2025 05:35  Mg     1.70      05 May 2025 05:35        CAPILLARY BLOOD GLUCOSE        BLOOD CULTURE    RADIOLOGY & ADDITIONAL TESTS:    Imaging Personally Reviewed:  [ ] YES     Consultant(s) Notes Reviewed:      Care Discussed with Consultants/Other Providers:
Rae Cazares MD   [facility] Medicine  Teams preferred  Pager: [phone]    Patient is a 35y old  Male who presents with a chief complaint of Hyponatremia (05 May 2025 15:19)      INTERVAL HPI/OVERNIGHT EVENTS:    MEDICATIONS  (STANDING):  cetirizine 10 milliGRAM(s) Oral daily  divalproex DR 1000 milliGRAM(s) Oral at bedtime  divalproex  milliGRAM(s) Oral daily  enoxaparin Injectable 40 milliGRAM(s) SubCutaneous every 24 hours  fluticasone propionate 50 MICROgram(s)/spray Nasal Spray 1 Spray(s) Both Nostrils two times a day  latanoprost 0.005% Ophthalmic Solution 1 Drop(s) Both EYES at bedtime  levothyroxine 50 MICROGram(s) Oral daily  multivitamin 1 Tablet(s) Oral daily  OXcarbazepine 1200 milliGRAM(s) Oral <User Schedule>  OXcarbazepine 600 milliGRAM(s) Oral <User Schedule>  oxybutynin 5 milliGRAM(s) Oral three times a day  polyethylene glycol 3350 17 Gram(s) Oral daily  psyllium Powder 1 Packet(s) Oral two times a day  sodium chloride 3 Gram(s) Oral three times a day  sodium chloride 0.65% Nasal 2 Spray(s) Both Nostrils daily  thiothixene 10 milliGRAM(s) Oral <User Schedule>    MEDICATIONS  (PRN):  acetaminophen     Tablet .. 650 milliGRAM(s) Oral every 6 hours PRN Temp greater or equal to 38C (100.4F), Mild Pain (1 - 3)  aluminum hydroxide/magnesium hydroxide/simethicone Suspension 30 milliLiter(s) Oral every 4 hours PRN Dyspepsia  melatonin 3 milliGRAM(s) Oral at bedtime PRN Insomnia  ondansetron Injectable 4 milliGRAM(s) IV Push every 8 hours PRN Nausea and/or Vomiting      Allergies    lactose hydrous, xanthine derivatives, caffeine (Unknown)  caffeine (Other)  xanthines (Unknown)    Intolerances    lactose (Diarrhea)      REVIEW OF SYSTEMS:  Please see interval HPI:    I&O's Detail        PHYSICAL EXAM:  Vital Signs Last 24 Hrs  T(C): 36.5 (06 May 2025 11:26), Max: 36.9 (06 May 2025 06:50)  T(F): 97.7 (06 May 2025 11:26), Max: 98.5 (06 May 2025 06:50)  HR: 78 (06 May 2025 11:26) (69 - 82)  BP: 117/62 (06 May 2025 11:26) (102/50 - 129/84)  BP(mean): 74 (06 May 2025 11:26) (74 - 74)  RR: 17 (06 May 2025 11:26) (17 - 18)  SpO2: 97% (06 May 2025 11:26) (95% - 99%)    Parameters below as of 06 May 2025 11:26  Patient On (Oxygen Delivery Method): room air      CONSTITUTIONAL: NAD,   ENMT: Moist oral mucosa,   RESPIRATORY: Normal respiratory effort; lungs are clear to auscultation bilaterally  CARDIOVASCULAR: Regular rate and rhythm, normal S1 and S2, no murmur/rub/gallop; No lower extremity edema;   ABDOMEN: Nontender to palpation, normoactive bowel sounds, no rebound/guarding; No hepatosplenomegaly  EXT: no edema b/l  NEUROLOGY: alert, following commands, happy  SKIN: No rashes; no palpable lesions      LABS:                        12.9   8.24  )-----------( 246      ( 06 May 2025 06:25 )             36.8     06 May 2025 06:25    129    |  93     |  14     ----------------------------<  85     4.3     |  23     |  0.67     Ca    8.5        06 May 2025 06:25  Phos  3.8       06 May 2025 06:25  Mg     1.70      06 May 2025 06:25        CAPILLARY BLOOD GLUCOSE        BLOOD CULTURE    RADIOLOGY & ADDITIONAL TESTS:    Imaging Personally Reviewed:  [ ] YES     Consultant(s) Notes Reviewed:      Care Discussed with Consultants/Other Providers:
[facility] Division of Hospital Medicine  George De La Garza MD  Pager (M-F, 8A-5P)    Patient is a 35y old  Male who presents with a chief complaint of Hyponatremia (03 May 2025 12:56)      SUBJECTIVE / OVERNIGHT EVENTS:  ADDITIONAL REVIEW OF SYSTEMS:    MEDICATIONS  (STANDING):  cetirizine 10 milliGRAM(s) Oral daily  divalproex DR 1000 milliGRAM(s) Oral at bedtime  divalproex  milliGRAM(s) Oral daily  enoxaparin Injectable 40 milliGRAM(s) SubCutaneous every 24 hours  fluticasone propionate 50 MICROgram(s)/spray Nasal Spray 1 Spray(s) Both Nostrils two times a day  latanoprost 0.005% Ophthalmic Solution 1 Drop(s) Both EYES at bedtime  levothyroxine 50 MICROGram(s) Oral daily  multivitamin 1 Tablet(s) Oral daily  OXcarbazepine 1200 milliGRAM(s) Oral <User Schedule>  OXcarbazepine 600 milliGRAM(s) Oral <User Schedule>  oxybutynin 5 milliGRAM(s) Oral three times a day  polyethylene glycol 3350 17 Gram(s) Oral daily  psyllium Powder 1 Packet(s) Oral two times a day  sodium chloride 2 Gram(s) Oral three times a day  sodium chloride 0.65% Nasal 2 Spray(s) Both Nostrils daily  thiothixene 10 milliGRAM(s) Oral <User Schedule>    MEDICATIONS  (PRN):  acetaminophen     Tablet .. 650 milliGRAM(s) Oral every 6 hours PRN Temp greater or equal to 38C (100.4F), Mild Pain (1 - 3)  aluminum hydroxide/magnesium hydroxide/simethicone Suspension 30 milliLiter(s) Oral every 4 hours PRN Dyspepsia  melatonin 3 milliGRAM(s) Oral at bedtime PRN Insomnia  ondansetron Injectable 4 milliGRAM(s) IV Push every 8 hours PRN Nausea and/or Vomiting      CAPILLARY BLOOD GLUCOSE        I&O's Summary      PHYSICAL EXAM:  Vital Signs Last 24 Hrs  T(C): 36.2 (04 May 2025 11:01), Max: 36.8 (03 May 2025 15:18)  T(F): 97.2 (04 May 2025 11:01), Max: 98.2 (03 May 2025 15:18)  HR: 60 (04 May 2025 11:01) (60 - 74)  BP: 128/84 (04 May 2025 11:01) (102/55 - 128/84)  BP(mean): --  RR: 18 (04 May 2025 11:01) (18 - 18)  SpO2: 99% (04 May 2025 11:01) (97% - 100%)    Parameters below as of 04 May 2025 11:01  Patient On (Oxygen Delivery Method): room air      CONSTITUTIONAL: NAD, well-developed, well-groomed  EYES: PERRLA; conjunctiva and sclera clear  ENMT: Moist oral mucosa, no pharyngeal injection or exudates; normal dentition  NECK: Supple, no palpable masses; no thyromegaly  RESPIRATORY: Normal respiratory effort; lungs are clear to auscultation bilaterally  CARDIOVASCULAR: Regular rate and rhythm, normal S1 and S2, no murmur/rub/gallop; No lower extremity edema; Peripheral pulses are 2+ bilaterally  ABDOMEN: Nontender to palpation, normoactive bowel sounds, no rebound/guarding; No hepatosplenomegaly  MUSCLOSKELETAL:  Normal gait; no clubbing or cyanosis of digits; no joint swelling or tenderness to palpation  PSYCH: A+O to person, place, and time; affect appropriate  NEUROLOGY: CN 2-12 are intact and symmetric; no gross sensory deficits;   SKIN: No rashes; no palpable lesions    LABS:                        13.4   7.41  )-----------( 237      ( 04 May 2025 06:30 )             38.1     05-04    126[L]  |  93[L]  |  12  ----------------------------<  83  4.2   |  21[L]  |  0.68    Ca    8.7      04 May 2025 06:30  Phos  3.5     05-04  Mg     1.80     05-04    TPro  7.8  /  Alb  4.4  /  TBili  0.2  /  DBili  x   /  AST  27  /  ALT  13  /  AlkPhos  111  05-02          Urinalysis Basic - ( 04 May 2025 06:30 )    Color: x / Appearance: x / SG: x / pH: x  Gluc: 83 mg/dL / Ketone: x  / Bili: x / Urobili: x   Blood: x / Protein: x / Nitrite: x   Leuk Esterase: x / RBC: x / WBC x   Sq Epi: x / Non Sq Epi: x / Bacteria: x          RADIOLOGY & ADDITIONAL TESTS:  Results Reviewed    
[facility] Division of Hospital Medicine  George De La Garza MD  Pager (M-F, 8A-5P)    Patient is a 35y old  Male who presents with a chief complaint of Hyponatremia (03 May 2025 01:25)      MEDICATIONS  (STANDING):  cetirizine 10 milliGRAM(s) Oral daily  divalproex DR 1000 milliGRAM(s) Oral at bedtime  divalproex  milliGRAM(s) Oral daily  enoxaparin Injectable 40 milliGRAM(s) SubCutaneous every 24 hours  fluticasone propionate 50 MICROgram(s)/spray Nasal Spray 1 Spray(s) Both Nostrils two times a day  latanoprost 0.005% Ophthalmic Solution 1 Drop(s) Both EYES at bedtime  levothyroxine 50 MICROGram(s) Oral daily  multivitamin/minerals 1 Tablet(s) Oral daily  OXcarbazepine 1200 milliGRAM(s) Oral <User Schedule>  OXcarbazepine 600 milliGRAM(s) Oral <User Schedule>  oxybutynin 5 milliGRAM(s) Oral three times a day  polyethylene glycol 3350 17 Gram(s) Oral daily  psyllium Powder 1 Packet(s) Oral two times a day  sodium chloride 2 Gram(s) Oral three times a day  sodium chloride 0.65% Nasal 2 Spray(s) Both Nostrils daily  thiothixene 10 milliGRAM(s) Oral <User Schedule>    MEDICATIONS  (PRN):  acetaminophen     Tablet .. 650 milliGRAM(s) Oral every 6 hours PRN Temp greater or equal to 38C (100.4F), Mild Pain (1 - 3)  aluminum hydroxide/magnesium hydroxide/simethicone Suspension 30 milliLiter(s) Oral every 4 hours PRN Dyspepsia  melatonin 3 milliGRAM(s) Oral at bedtime PRN Insomnia  ondansetron Injectable 4 milliGRAM(s) IV Push every 8 hours PRN Nausea and/or Vomiting      CAPILLARY BLOOD GLUCOSE        I&O's Summary      PHYSICAL EXAM:  Vital Signs Last 24 Hrs  T(C): 36.6 (03 May 2025 10:40), Max: 37.1 (03 May 2025 05:38)  T(F): 97.8 (03 May 2025 10:40), Max: 98.7 (03 May 2025 05:38)  HR: 74 (03 May 2025 10:40) (68 - 90)  BP: 113/71 (03 May 2025 10:40) (113/71 - 161/94)  BP(mean): --  RR: 17 (03 May 2025 10:40) (16 - 17)  SpO2: 98% (03 May 2025 10:40) (98% - 100%)    Parameters below as of 03 May 2025 10:40  Patient On (Oxygen Delivery Method): room air      CONSTITUTIONAL: NAD, well-developed, well-groomed  EYES: PERRLA; conjunctiva and sclera clear  ENMT: Moist oral mucosa, no pharyngeal injection or exudates; normal dentition  NECK: Supple, no palpable masses; no thyromegaly  RESPIRATORY: Normal respiratory effort; lungs are clear to auscultation bilaterally  CARDIOVASCULAR: Regular rate and rhythm, normal S1 and S2, no murmur/rub/gallop; No lower extremity edema; Peripheral pulses are 2+ bilaterally  ABDOMEN: Nontender to palpation, normoactive bowel sounds, no rebound/guarding; No hepatosplenomegaly  MUSCLOSKELETAL:  Normal gait; no clubbing or cyanosis of digits; no joint swelling or tenderness to palpation  PSYCH: A+O to person, place, and time; affect appropriate  NEUROLOGY: CN 2-12 are intact and symmetric; no gross sensory deficits;   SKIN: No rashes; no palpable lesions    LABS:                        14.1   10.70 )-----------( 258      ( 03 May 2025 10:15 )             38.9     05-03    123[L]  |  89[L]  |  8   ----------------------------<  96  4.3   |  20[L]  |  0.45[L]    Ca    9.4      03 May 2025 10:15  Phos  3.3     05-03  Mg     1.80     05-03    TPro  7.8  /  Alb  4.4  /  TBili  0.2  /  DBili  x   /  AST  27  /  ALT  13  /  AlkPhos  111  05-02          Urinalysis Basic - ( 03 May 2025 10:15 )    Color: x / Appearance: x / SG: x / pH: x  Gluc: 96 mg/dL / Ketone: x  / Bili: x / Urobili: x   Blood: x / Protein: x / Nitrite: x   Leuk Esterase: x / RBC: x / WBC x   Sq Epi: x / Non Sq Epi: x / Bacteria: x          RADIOLOGY & ADDITIONAL TESTS:  Results Reviewed

## 2025-05-06 NOTE — PROGRESS NOTE ADULT - PROBLEM SELECTOR PLAN 1
Presented to the ED due to vomiting and change in mental status   On arrival had Na of 118 and received 100 cc of 3% HTS with improvement to 123  Nephrology consulted and provided recommendations  Home medications: NaCl 1 gm 4 times/day     Acute on chronic hyponatremia likely due to SIADH due to medications (Oxcarbazepine and Depakote ER) vs decrease in PO intake     Monitor Na -> 129  Avoid overcorrection - goal is to increase Na by 6-8 mEq/L in 24 hours   Fluid restriction   Strict Is/Os  Avoid HTS unless seizures or AMS  Urine studies: Urine Na 182 ^, urine osm 632 ^, urine K 51.4- Strongly supports SIADH  Nephrology following  c/w salt tabs to 3mg TID and c/w fluid restriction
Presented to the ED due to vomiting and change in mental status   On arrival had Na of 118 and received 100 cc of 3% HTS with improvement to 123  Nephrology consulted and provided recommendations  Home medications: NaCl 1 gm 4 times/day     Acute on chronic hyponatremia likely due to SIADH due to medications (Oxcarbazepine and Depakote ER) vs decrease in PO intake     Monitor Na -> 129  Avoid overcorrection - goal is to increase Na by 6-8 mEq/L in 24 hours   Hold home dose of NaCl tablets until discussed with Nephrology   Fluid restriction   Strict Is/Os  Avoid HTS unless seizures or AMS  Urine studies: Urine Na 182 ^, urine osm 632 ^, urine K 51.4- Strongly supports SIADH  f/u uric acid levels  Nephrology following  c/w salt tabs to 3mg TID and c/w fluid restriction
Pt. with acute on chronic hyponatremia possibly in setting of decreased oral intake and ADH stimulation due to nausea/emesis vs. due to ADH stimulation due to medication use (oxcarbazepine (OXC), and valproic acid). On review of [facility], last SNa slightly low at 133 on 11/29/24. No recent urine studies available for review. SNa low at 118 (120 corrected) on admission. Radha 182, UOsm 632. S/p 3% (5/2).    -SNa improved to 126 but today remains low. Increase salt tabs to 3gm TID and ensure fluid restriction of 1L/24hr.       Rashard Murrieta  Nephrology Fellow  Feel free to contact me on TEAMS  After 5 pm and on weekends please contact the on-call Fellow.
Presented to the ED due to vomiting and change in mental status   On arrival had Na of 118 and received 100 cc of 3% HTS with improvement to 123  Nephrology consulted and provided recommendations  Home medications: NaCl 1 gm 4 times/day     Acute on chronic hyponatremia likely due to SIADH due to medications (Oxcarbazepine and Depakote ER) vs decrease in PO intake     Monitor Na -> 126 now IMPROVING but slowly   Avoid overcorrection - goal is to increase Na by 6-8 mEq/L in 24 hours   Hold home dose of NaCl tablets until discussed with Nephrology   Fluid restriction   Strict Is/Os  Avoid HTS unless seizures or AMS  Urine studies: Urine Na 182 ^, urine osm 632 ^, urine K 51.4- Strongly supports SIADH  f/u uric acid levels  Nephrology following  Increase salt tabs to 3mg TID and c/w fluid restriction
Presented to the ED due to vomiting and change in mental status   On arrival had Na of 118 and received 100 cc of 3% HTS with improvement to 123  Nephrology consulted and provided recommendations  Home medications: NaCl 1 gm 4 times/day     Acute on chronic hyponatremia likely due to SIADH due to medications (Oxcarbazepine and Depakote ER) vs decrease in PO intake     Monitor Na  Avoid overcorrection - goal is to increase Na by 6-8 mEq/L in 24 hours   Hold home dose of NaCl tablets until discussed with Nephrology   Fluid restriction   Strict Is/Os  Avoid HTS unless seizures or AMS  Urine studies: Urine Na 182 ^, urine osm 632 ^, urine K 51.4- Strongly supports SIADH  f/u uric acid levels  Nephrology following

## 2025-05-06 NOTE — PROGRESS NOTE ADULT - PROBLEM SELECTOR PLAN 6
DVT prophylaxis: Lovenox  GI prophylaxis: NI  Diet: Regular, E2C  Ethics: Full code  Disposition: Pending clinical course
DVT prophylaxis: Lovenox  GI prophylaxis: NI  Diet: Regular, E2C  Ethics: Full code  Disposition: Pending clinical course cleared by renal should be dc today with f/u of BMP as outpatient in 3-4 days
DVT prophylaxis: Lovenox  GI prophylaxis: NI  Diet: Regular, E2C  Ethics: Full code  Disposition: Pending clinical course.
DVT prophylaxis: Lovenox  GI prophylaxis: NI  Diet: Regular, E2C  Ethics: Full code  Disposition: Pending clinical course cleared by renal should be dc today with f/u of BMP as outpatient in 3-4 days  discharge today

## 2025-05-06 NOTE — DISCHARGE NOTE NURSING/CASE MANAGEMENT/SOCIAL WORK - NSDCVIVACCINE_GEN_ALL_CORE_FT
Tdap; 10-Mar-2017 09:27; Rossy England (RN); [employer]; [id number]; IntraMuscular; Deltoid Right.; 0.5 milliLiter(s); VIS (VIS Published: 09-May-2013, VIS Presented: 10-Mar-2017);   Tdap; 17-May-2017 19:03; Daniella Rahman (RN); [employer]; [id number]; IntraMuscular; Deltoid Right.; 0.5 milliLiter(s); VIS (VIS Published: 09-May-2013, VIS Presented: 17-May-2017);   Tdap; 08-Aug-2018 15:01; Milton Muro (RN); [employer]; [id number]; IntraMuscular; Deltoid Right.; 0.5 milliLiter(s); VIS (VIS Published: 09-May-2013, VIS Presented: 08-Aug-2018);

## 2025-05-08 NOTE — ED PROVIDER NOTE - SKIN, MLM
Skin normal color for race, warm, dry and intact. No evidence of rash.
impairments found/functional limitations in following categories

## 2025-05-18 ENCOUNTER — EMERGENCY (EMERGENCY)
Facility: HOSPITAL | Age: 35
LOS: 1 days | End: 2025-05-18
Attending: EMERGENCY MEDICINE | Admitting: EMERGENCY MEDICINE
Payer: MEDICAID

## 2025-05-18 VITALS
SYSTOLIC BLOOD PRESSURE: 130 MMHG | RESPIRATION RATE: 15 BRPM | HEART RATE: 80 BPM | DIASTOLIC BLOOD PRESSURE: 108 MMHG | OXYGEN SATURATION: 100 %

## 2025-05-18 VITALS
DIASTOLIC BLOOD PRESSURE: 98 MMHG | HEART RATE: 91 BPM | HEIGHT: 65 IN | RESPIRATION RATE: 18 BRPM | WEIGHT: 149.91 LBS | OXYGEN SATURATION: 99 % | TEMPERATURE: 98 F | SYSTOLIC BLOOD PRESSURE: 152 MMHG

## 2025-05-18 DIAGNOSIS — Z98.811 DENTAL RESTORATION STATUS: Chronic | ICD-10-CM

## 2025-05-18 PROCEDURE — 72125 CT NECK SPINE W/O DYE: CPT | Mod: 26

## 2025-05-18 PROCEDURE — 70450 CT HEAD/BRAIN W/O DYE: CPT | Mod: 26

## 2025-05-18 PROCEDURE — 99284 EMERGENCY DEPT VISIT MOD MDM: CPT

## 2025-05-18 RX ORDER — LORAZEPAM 4 MG/ML
2 VIAL (ML) INJECTION ONCE
Refills: 0 | Status: DISCONTINUED | OUTPATIENT
Start: 2025-05-18 | End: 2025-05-18

## 2025-05-18 RX ADMIN — Medication 2 MILLIGRAM(S): at 09:52

## 2025-05-18 NOTE — ED ADULT TRIAGE NOTE - WEIGHT IN LBS
Inna Lowry, REBEL ZENG RN                
149.9

## 2025-05-18 NOTE — ED ADULT NURSE NOTE - OBJECTIVE STATEMENT
35 YOM presents awake and alert. Pt coming into ED from group home with two staff members at bedside. Pt hit head on door, denies any pain at this time. Swelling noted on forehead. No active bleeding, ecchymosis noted, or other signs of injury noted. PMHx autism, MR. Pt denies any chest pain, sob, dizziness, N/V/D. Pt appears anxious at this time. per staff at bedside, no LOC or blood thinner use. Pending MD ruelas. Bed in [facility], safety maintained.

## 2025-05-18 NOTE — ED PROVIDER NOTE - ATTENDING CONTRIBUTION TO CARE
Patient is a 35-year-old male with a history of autism, developmental delay, severe intellectual disability, hypothyroidism, seizure disorder, history of lead poisoning, chronic constipation, enuresis, chronic hyponatremia who is here from group home for evaluation of head trauma.  Patient has a history of hitting himself in the head or banging his head against the door.  He does wear helmet for protection.  Overnight, he banged his head against the door.  Staff were with him do not know the extent of what happened last night.  There was no known loss of consciousness.  There has been no vomiting.  Patient is a limited historian.  He has a hematoma/contusion to his frontal forehead and his right parietal head.  When asked about neck pain, he states yes.  Patient is otherwise moving all of his arms, with a symmetrical face.      VS noted  Gen.  no acute distress, awake, asking to leave, speaking at baseline,  HEENT: EOMI, mmm, hematoma to frontal forehead, right parietal scalp   spine: No step-offs, no apparent C–T–L-spine tenderness, no skin changes  Lungs: CTAB/L no C/ W /R   CVS: RRR   Abd; Soft non tender, non distended   Ext: no edema  Skin: no rash  Neuro: awake, alert, symmetrical face, speech at baseline, moving all extremities  a/p:  s/p self-induced head trauma–patient has a long history of hitting his head against a door or another object.  He arrives with a helmet.  He is redirectable and calm.  Staff are at bedside.  He is a poor historian.  Plan for CT head, CT C-spine.  Will give p.o. Ativan to keep patient calm for CT imaging.  Marcello Marc MD

## 2025-05-18 NOTE — ED PROVIDER NOTE - PROGRESS NOTE DETAILS
Monico RAE: CT IMPRESSION:    CT HEAD:  No acute intracranial hemorrhage, mass effect, or midline shift.  Multiple areas of bilateral gray-white junction indistinctness, most   likely due to motion artifact. Repeat if clinically indicated.  No new scalp hematoma.  Chronic fracture of anterior nasal spine.    CT CERVICAL SPINE:  No acute fracture or traumatic subluxation.    Patient has been stable in the ED.  He is calm and in no acute distress.  No indication for repeat CT.

## 2025-05-18 NOTE — ED PROVIDER NOTE - CLINICAL SUMMARY MEDICAL DECISION MAKING FREE TEXT BOX
34 y/o M with PMHx of severe intellectual disability, autism c/b developmental delay, hypothyroidism, seizure disorder, lead poisoning, chronic constipation, enuresis, and chronic hyponatremia presented to the ED brought in by staff from alf for eval of head injury s/p hitting his head on the door during behavior early this morning with palpable lump on anterior aspect of head and right side of head.   Staff members: Bertin. Stated this was witnessed by overnight staff. No vomiting since. Patient is cooperative and able to endorse pain in the head and neck.   On exam, patient is calm and cooperative. Appears in NAD. Patient A&O. Re-directable. Calm and cooperative. Lungs clear and equal breath sounds bilaterally, heart RRR, abdomen soft and non tender to palpation. Palpable contusion with surrounding swelling anterior head near hairline with surrounding redness, and right side of head. No open wounds. No bleeding or drainage.   Vitals: /98, HR 91, afebrile at 97.7F, O2 99% on RA.   Will obtain CT head and neck secondary to inability for patient to endorse pain/headache/dizziness/change in vision. Will assess for intracranial pathology and/or fracture 2/2 head trauma.   Patient A&O currently. Calm and cooperative. Re-directable.   Discussed with staff members CT non con does not require IV, but does require patient to lay still for the entirety of the exam. patient has been given ativan prior hospital visits. Will provide PO ativan for scans to be completed. Pending results will discuss dispo to home versus continued care.   No indication for labs at this time.      See progress notes for ongoing care.

## 2025-05-18 NOTE — ED ADULT NURSE NOTE - NSFALLRISKINTERV_ED_ALL_ED

## 2025-05-18 NOTE — ED PROVIDER NOTE - PATIENT PORTAL LINK FT
You can access the FollowMyHealth Patient Portal offered by [facility] by registering at the following website: [email or web address]. By joining [employer]’s FollowMyHealth portal, you will also be able to view your health information using other applications (apps) compatible with our system.

## 2025-05-18 NOTE — ED ADULT NURSE NOTE - CHIEF COMPLAINT QUOTE
pt brought in by staff from [facility] for hitting his head on the door. pt noted to have lump on head. pt calm and cooperative in triage.

## 2025-05-18 NOTE — ED ADULT NURSE REASSESSMENT NOTE - NS ED NURSE REASSESS COMMENT FT1
Received report from KAVON Ozuna, pt remains at baseline mental status, resting in stretcher. no nonverbal indicators of pain at this time. abrasion to right forehead and right lateral head noted. no active bleeding. PERRLA, no neuro deficits at this time. respirations even and unlabored on room air. pending CT results. no new orders at this time. bed at lowest position, side rails are up. group home staff are at bedside. plan of care continued.

## 2025-05-18 NOTE — ED PROVIDER NOTE - OBJECTIVE STATEMENT
36 y/o M with PMHx of severe intellectual disability, autism c/b developmental delay, hypothyroidism, seizure disorder, lead poisoning, chronic constipation, enuresis, and chronic hyponatremia presented to the ED brought in by staff from [facility] for eval of head injury s/p hitting his head on the door with palpable lump on head.   Staff members: 34 y/o M with PMHx of severe intellectual disability, autism c/b developmental delay, hypothyroidism, seizure disorder, lead poisoning, chronic constipation, enuresis, and chronic hyponatremia presented to the ED brought in by staff from [facility] for eval of head injury s/p hitting his head on the door during behavior early this morning with palpable lump on anterior aspect of head and right side of head.   Staff members: Bertin. Stated this was witnessed by overnight staff. No vomiting since. Patient is cooperative and able to endorse pain in the head and neck.

## 2025-05-18 NOTE — ED ADULT TRIAGE NOTE - CHIEF COMPLAINT QUOTE
pt brought in by staff from [facility] for hitting his head on the door. pt noted to have lump on head. pt calm and cooperative in triage.

## 2025-05-18 NOTE — ED ADULT NURSE NOTE - CAS ELECT INFOMATION PROVIDED
Day 14 post asymptomatic workplace exposure.  Serial antigens negative  TM remains asymptomatic  Active monitoring and symptom tracker discontinued.  TM remains working  EH no longer following  
DC instructions

## 2025-05-18 NOTE — ED PROVIDER NOTE - NSFOLLOWUPINSTRUCTIONS_ED_ALL_ED_FT
You were evaluated here in the emergency department for head trauma.  You had a CT head and CT cervical spine done.  There is no evidence of an acute intracranial hemorrhage or fracture.  Your CT report is as follows:    IMPRESSION:    CT HEAD:  No acute intracranial hemorrhage, mass effect, or midline shift.  Multiple areas of bilateral gray-white junction indistinctness, most   likely due to motion artifact. Repeat if clinically indicated.  No new scalp hematoma.  Chronic fracture of anterior nasal spine.    CT CERVICAL SPINE:  No acute fracture or traumatic subluxation.       At this time, there is no indication for repeat CT.  You are stable for discharge home.  Please continue to wear your helmet and do not bang your head against the door.  Watch for signs and symptoms of confusion, vomiting, severe headache.  If any of these occur, return for reassessment.  Otherwise, follow-up with your doctor as indicated.

## 2025-05-18 NOTE — ED ADULT NURSE REASSESSMENT NOTE - NS ED NURSE REASSESS COMMENT FT1
Medication administered per eMAR for pt to remain calm during CT. Pt tolerated well. Safety maintained.

## 2025-05-18 NOTE — ED PROVIDER NOTE - PHYSICAL EXAMINATION
CONSTITUTIONAL: Well appearing, awake, alert, oriented to person, place, time/situation.   HEAD:  Palpable contusion anterior head near hairline with surrounding redness, and contusion right side of head. No open wound. No bleeding or drainage. normocephalic   NECK: Supple, no tender lymphadenopathy.   EYES: Clear bilaterally, pupils equal, round and reactive to light.   ENMT: Airway patent, Nasal mucosa clear. Mouth with normal mucosa.   CARDIAC: Regular rate, regular rhythm.   RESPIRATORY: Breathing unlabored. Breath sounds clear and equal bilaterally.   ABDOMEN:  Soft, nontender, nondistended. No rebound tenderness or guarding.   SKIN: Skin warm and dry   MSK: Able to range all 4 limbs. Ambulatory.

## 2025-05-18 NOTE — ED ADULT NURSE NOTE - PRIMARY CARE PROVIDER
Called pt regarding message. Pt went to ER on 12/16. Pt was prescribed liquid Carafate. Pt stated medication is expensive.Pt wants to know if PCP can prescribe Carafate tablets?   unknown

## 2025-05-28 ENCOUNTER — RESULT REVIEW (OUTPATIENT)
Age: 35
End: 2025-05-28

## 2025-05-28 ENCOUNTER — OUTPATIENT (OUTPATIENT)
Dept: OUTPATIENT SERVICES | Facility: HOSPITAL | Age: 35
LOS: 1 days | End: 2025-05-28
Payer: MEDICAID

## 2025-05-28 ENCOUNTER — APPOINTMENT (OUTPATIENT)
Dept: ORTHOPEDIC SURGERY | Facility: HOSPITAL | Age: 35
End: 2025-05-28

## 2025-05-28 VITALS
RESPIRATION RATE: 18 BRPM | BODY MASS INDEX: 28.66 KG/M2 | DIASTOLIC BLOOD PRESSURE: 86 MMHG | WEIGHT: 172 LBS | HEART RATE: 85 BPM | HEIGHT: 65 IN | TEMPERATURE: 97.7 F | OXYGEN SATURATION: 96 % | SYSTOLIC BLOOD PRESSURE: 129 MMHG

## 2025-05-28 DIAGNOSIS — M79.9 SOFT TISSUE DISORDER, UNSPECIFIED: ICD-10-CM

## 2025-05-28 DIAGNOSIS — Z98.811 DENTAL RESTORATION STATUS: Chronic | ICD-10-CM

## 2025-05-28 DIAGNOSIS — M25.551 PAIN IN RIGHT HIP: ICD-10-CM
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
## 2025-05-28 PROCEDURE — 72170 X-RAY EXAM OF PELVIS: CPT | Mod: 26

## 2025-05-28 PROCEDURE — G0463: CPT

## 2025-05-28 PROCEDURE — 72170 X-RAY EXAM OF PELVIS: CPT

## 2025-05-28 RX ORDER — DIVALPROEX SODIUM 500 MG/1
500 TABLET, DELAYED RELEASE ORAL DAILY
Refills: 0 | Status: ACTIVE | COMMUNITY
Start: 2025-05-28

## 2025-05-28 RX ORDER — THIOTHIXENE 5 MG/1
5 CAPSULE ORAL
Refills: 0 | Status: ACTIVE | COMMUNITY
Start: 2025-05-28

## 2025-05-28 RX ORDER — DOCUSATE SODIUM 100 MG/1
100 CAPSULE, LIQUID FILLED ORAL
Refills: 0 | Status: ACTIVE | COMMUNITY
Start: 2025-05-28

## 2025-05-28 RX ORDER — OXCARBAZEPINE 600 MG/1
600 TABLET, FILM COATED ORAL
Refills: 0 | Status: ACTIVE | COMMUNITY
Start: 2025-05-28

## 2025-05-28 RX ORDER — DIVALPROEX SODIUM 500 MG/1
500 TABLET, DELAYED RELEASE ORAL
Refills: 0 | Status: ACTIVE | COMMUNITY
Start: 2025-05-28

## 2025-05-28 RX ORDER — LORATADINE 10 MG/1
10 CAPSULE, LIQUID FILLED ORAL
Refills: 0 | Status: ACTIVE | COMMUNITY
Start: 2025-05-28

## 2025-05-28 RX ORDER — FLUTICASONE PROPIONATE 50 UG/1
50 SPRAY, METERED NASAL DAILY
Refills: 0 | Status: ACTIVE | COMMUNITY
Start: 2025-05-28

## 2025-05-28 RX ORDER — ACETAMINOPHEN 325 MG/1
325 TABLET ORAL
Refills: 0 | Status: ACTIVE | COMMUNITY
Start: 2025-05-28

## 2025-05-28 RX ORDER — MULTIVIT-MIN/FA/LYCOPEN/LUTEIN .4-300-25
TABLET ORAL DAILY
Refills: 0 | Status: ACTIVE | COMMUNITY
Start: 2025-05-28

## 2025-05-28 RX ORDER — OXYBUTYNIN CHLORIDE 5 MG/1
5 TABLET ORAL
Refills: 0 | Status: ACTIVE | COMMUNITY
Start: 2025-05-28

## 2025-05-28 RX ORDER — LACTASE 3000 UNIT
3000 TABLET ORAL 3 TIMES DAILY
Refills: 0 | Status: ACTIVE | COMMUNITY
Start: 2025-05-28

## 2025-05-28 RX ORDER — LATANOPROST/PF 0.005 %
0.01 DROPS OPHTHALMIC (EYE)
Refills: 0 | Status: ACTIVE | COMMUNITY
Start: 2025-05-28

## 2025-05-28 RX ORDER — LACTULOSE 10 G/10G
10 POWDER, FOR SOLUTION ORAL
Refills: 0 | Status: ACTIVE | COMMUNITY
Start: 2025-05-28

## 2025-05-28 RX ORDER — LEVOTHYROXINE SODIUM 0.05 MG/1
50 TABLET ORAL DAILY
Refills: 0 | Status: ACTIVE | COMMUNITY
Start: 2025-05-28

## 2025-05-28 RX ORDER — NORMAL SALT TABLETS 1 G/G
1 TABLET ORAL
Refills: 0 | Status: ACTIVE | COMMUNITY
Start: 2025-05-28

## 2025-06-03 ENCOUNTER — EMERGENCY (EMERGENCY)
Facility: HOSPITAL | Age: 35
LOS: 1 days | End: 2025-06-03
Attending: EMERGENCY MEDICINE
Payer: MEDICAID

## 2025-06-03 VITALS
SYSTOLIC BLOOD PRESSURE: 147 MMHG | WEIGHT: 167.99 LBS | HEART RATE: 92 BPM | HEIGHT: 65 IN | DIASTOLIC BLOOD PRESSURE: 86 MMHG | TEMPERATURE: 98 F | RESPIRATION RATE: 20 BRPM | OXYGEN SATURATION: 98 %

## 2025-06-03 DIAGNOSIS — Z98.811 DENTAL RESTORATION STATUS: Chronic | ICD-10-CM

## 2025-06-03 PROCEDURE — 99282 EMERGENCY DEPT VISIT SF MDM: CPT

## 2025-06-03 PROCEDURE — 99283 EMERGENCY DEPT VISIT LOW MDM: CPT

## 2025-06-03 NOTE — ED PROVIDER NOTE - PATIENT PORTAL LINK FT
You can access the FollowMyHealth Patient Portal offered by [facility] by registering at the following website: [email or web address]. By joining [employer]’s FollowMyHealth portal, you will also be able to view your health information using other applications (apps) compatible with our system.

## 2025-06-03 NOTE — ED ADULT TRIAGE NOTE - CHIEF COMPLAINT QUOTE
DSP reports that pt had a aggressive behavior today around 630 am hitting his head, having nose bleed at that time. Hx  severe intellectual disability, autism c/b developmental delay

## 2025-06-03 NOTE — ED ADULT NURSE NOTE - OBJECTIVE STATEMENT
35y male, DD delay, per staff pt had behavioral episode at 630 am, reports some bloody to nose, resolved soon after even, per staff pt is acting normally, at baseline mental status, per MD DT no interventions needed, RN unable to obtain VS on patient, MD aware and ok to dc.

## 2025-06-03 NOTE — ED PROVIDER NOTE - OBJECTIVE STATEMENT
35-year-old male with past medical history of severe intellectual disability, autism with developmental delay presents emergency department after hitting his head on the floor and having a nosebleed.  According to group home staff at bedside patient's has behavioral disturbances and can get agitated and hit his head on the floor.  Patient was doing that around 6:30 AM this morning.  And then started having bleeding from the right nostril which subsided within about 30 minutes.  Patient has not had any additional bleeding since then.  Patient has otherwise been acting normally according to staff.  No difficulty walking, no nausea or vomiting.  Patient able to eat and drink normally.  Patient otherwise alert and active at his baseline.

## 2025-06-03 NOTE — ED ADULT NURSE NOTE - NSFALLRISKINTERV_ED_ALL_ED

## 2025-06-03 NOTE — ED PROVIDER NOTE - CLINICAL SUMMARY MEDICAL DECISION MAKING FREE TEXT BOX
35-year-old male with past medical history of severe intellectual disability, autism with developmental delay presents emergency department after hitting his head on the floor and having a nosebleed. Patient very well-appearing on exam, no active epistaxis.  Patient with some dried blood in the right nostril.  Patient otherwise acting at baseline according to staff at bedside.  Patient with steady gait.  No nausea or vomiting.  Patient very alert and interactive.  Stating one-word answers which is his baseline.  No hemotympanum bilaterally.  Due to normal baseline mental status without any nausea, vomiting, gait abnormalities.  Low concern for significant ICH. Most likely traumatic epistaxis which was self-limited. 35-year-old male with past medical history of severe intellectual disability, autism with developmental delay presents emergency department after hitting his head on the floor during behavioral outburst and having a nosebleed ~6AM today. Patient very well-appearing on exam, no active epistaxis.  Patient with some dried blood in the right nostril. TMI BL.  Patient otherwise acting at baseline according to 2 staff members at bedside.  Patient with steady gait.  No nausea or vomiting.  Patient very alert and interactive.  Stating one-word answers which is his baseline.  Due to normal baseline mental status without any nausea, vomiting, gait abnormalities and duration of sx from time of injury.  Low concern for significant ICH. Most likely traumatic epistaxis which was self-limited.

## 2025-06-04 ENCOUNTER — OUTPATIENT (OUTPATIENT)
Dept: OUTPATIENT SERVICES | Facility: HOSPITAL | Age: 35
LOS: 1 days | End: 2025-06-04
Payer: MEDICAID

## 2025-06-04 ENCOUNTER — RESULT REVIEW (OUTPATIENT)
Age: 35
End: 2025-06-04

## 2025-06-04 DIAGNOSIS — Z98.811 DENTAL RESTORATION STATUS: Chronic | ICD-10-CM

## 2025-06-04 DIAGNOSIS — M25.551 PAIN IN RIGHT HIP: ICD-10-CM
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
## 2025-06-04 PROCEDURE — 72100 X-RAY EXAM L-S SPINE 2/3 VWS: CPT | Mod: 26

## 2025-06-04 PROCEDURE — 73522 X-RAY EXAM HIPS BI 3-4 VIEWS: CPT

## 2025-06-04 PROCEDURE — 73522 X-RAY EXAM HIPS BI 3-4 VIEWS: CPT | Mod: 26

## 2025-06-04 PROCEDURE — 72100 X-RAY EXAM L-S SPINE 2/3 VWS: CPT

## 2025-06-10 ENCOUNTER — APPOINTMENT (OUTPATIENT)
Dept: NEPHROLOGY | Facility: CLINIC | Age: 35
End: 2025-06-10
Payer: MEDICAID

## 2025-06-10 VITALS
HEIGHT: 65 IN | TEMPERATURE: 97.7 F | SYSTOLIC BLOOD PRESSURE: 133 MMHG | OXYGEN SATURATION: 99 % | DIASTOLIC BLOOD PRESSURE: 86 MMHG | HEART RATE: 80 BPM

## 2025-06-10 PROBLEM — F72 SEVERE INTELLECTUAL DISABILITY: Status: ACTIVE | Noted: 2025-06-10

## 2025-06-10 PROBLEM — E03.9 HYPOTHYROIDISM, UNSPECIFIED TYPE: Status: ACTIVE | Noted: 2025-06-10

## 2025-06-10 PROBLEM — E87.1 CHRONIC HYPONATREMIA: Status: ACTIVE | Noted: 2025-06-10

## 2025-06-10 PROBLEM — R62.50 DEVELOPMENT DELAY: Status: ACTIVE | Noted: 2025-06-10

## 2025-06-10 PROBLEM — F84.0 AUTISM DISORDER: Status: ACTIVE | Noted: 2025-06-10

## 2025-06-10 PROBLEM — G40.909 SEIZURE DISORDER: Status: ACTIVE | Noted: 2025-06-10

## 2025-06-10 PROCEDURE — G2211 COMPLEX E/M VISIT ADD ON: CPT | Mod: NC

## 2025-06-10 PROCEDURE — 99215 OFFICE O/P EST HI 40 MIN: CPT

## 2025-06-11 ENCOUNTER — OUTPATIENT (OUTPATIENT)
Dept: OUTPATIENT SERVICES | Facility: HOSPITAL | Age: 35
LOS: 1 days | End: 2025-06-11
Payer: MEDICAID

## 2025-06-11 ENCOUNTER — APPOINTMENT (OUTPATIENT)
Dept: ORTHOPEDIC SURGERY | Facility: HOSPITAL | Age: 35
End: 2025-06-11

## 2025-06-11 VITALS
WEIGHT: 172 LBS | RESPIRATION RATE: 18 BRPM | OXYGEN SATURATION: 97 % | SYSTOLIC BLOOD PRESSURE: 135 MMHG | BODY MASS INDEX: 28.66 KG/M2 | HEART RATE: 82 BPM | DIASTOLIC BLOOD PRESSURE: 94 MMHG | HEIGHT: 65 IN | TEMPERATURE: 98.2 F

## 2025-06-11 DIAGNOSIS — M79.9 SOFT TISSUE DISORDER, UNSPECIFIED: ICD-10-CM

## 2025-06-11 DIAGNOSIS — Z98.811 DENTAL RESTORATION STATUS: Chronic | ICD-10-CM

## 2025-06-11 LAB
ANION GAP SERPL CALC-SCNC: 17 MMOL/L
BUN SERPL-MCNC: 17 MG/DL
CALCIUM SERPL-MCNC: 9.1 MG/DL
CHLORIDE SERPL-SCNC: 95 MMOL/L
CO2 SERPL-SCNC: 22 MMOL/L
CREAT SERPL-MCNC: 0.67 MG/DL
EGFRCR SERPLBLD CKD-EPI 2021: 125 ML/MIN/1.73M2
GLUCOSE SERPL-MCNC: 74 MG/DL
OSMOLALITY SERPL: 274 MOSMOL/KG
POTASSIUM SERPL-SCNC: 4.5 MMOL/L
SODIUM SERPL-SCNC: 134 MMOL/L
TSH SERPL-ACNC: 2.39 UIU/ML

## 2025-06-11 PROCEDURE — G0463: CPT

## 2025-06-12 DIAGNOSIS — M25.551 PAIN IN RIGHT HIP: ICD-10-CM

## 2025-07-21 ENCOUNTER — EMERGENCY (EMERGENCY)
Facility: HOSPITAL | Age: 35
LOS: 1 days | End: 2025-07-21
Attending: EMERGENCY MEDICINE | Admitting: EMERGENCY MEDICINE

## 2025-07-21 VITALS — HEIGHT: 65 IN

## 2025-07-21 DIAGNOSIS — Z98.811 DENTAL RESTORATION STATUS: Chronic | ICD-10-CM

## 2025-07-21 PROCEDURE — 99283 EMERGENCY DEPT VISIT LOW MDM: CPT

## 2025-07-21 NOTE — ED ADULT TRIAGE NOTE - CHIEF COMPLAINT QUOTE
pt, presents from group home after sliding off of his bed and hitting his head on the ground multiple times after being told he is on a fluid restricted diet and could not have a beverage. pt. alert and able to be redirected, Pt. non-verbal at baseline, Hx of autism. unable to obtain vital signs in triage, starts hitting writer with his head.

## 2025-07-22 NOTE — ED PROVIDER NOTE - OBJECTIVE STATEMENT
35-year-old male with past medical history of severe intellectual disability, autism with developmental delay presents to the ED after sliding off his bed and hitting his head on the ground multiple times after being told he is on fluid restriction diet and cannot for breakfast.  Aide at bedside notes no LOC.  Not on AC.  Patient nonverbal at baseline and poor historian. 35-year-old male with past medical history of severe intellectual disability, autism with developmental delay presents to the ED after sliding off his bed and hitting his head on the ground multiple times after being told he is on fluid restriction diet and cannot drink juice. Aide at bedside notes no LOC.  Not on AC.  Patient nonverbal at baseline and poor historian.

## 2025-07-22 NOTE — ED ADULT NURSE NOTE - NSFALLRISKINTERV_ED_ALL_ED

## 2025-07-22 NOTE — ED ADULT NURSE NOTE - OBJECTIVE STATEMENT
Break RN: 36yo male received in room 10. Pt A&OX4, ambulatory, hx of intellectual disability, autism, non-verbal, was sent to the ER after hitting his head on the ground during tantrum. No LOC, no AC. Pt well appearing, At baseline per aid at bedside. Side rails up, bed at lowest position, call bell within reach, patient oriented to the unit, safety maintained. MD ruelas in progress

## 2025-07-22 NOTE — ED PROVIDER NOTE - CLINICAL SUMMARY MEDICAL DECISION MAKING FREE TEXT BOX
Afebrile hemodynamically stable male presents to the ED for noted head strike.  No LOC.  Not on AC.  Physical exam normal EOM, PERRLA.  No focal deficits.  No signs of skull fracture.  No midline tenderness C/T/L..  Clear TM bilaterally.  According to aide at bedside patient at baseline.  Labs and imaging not warranted this time.  Medically clear for discharge.  Gave strict return precautions. Ruperto att: Afebrile hemodynamically stable male presents to the ED for noted head strike.  No LOC.  Not on AC.  Physical exam normal EOM, PERRLA.  No focal deficits.  No signs of skull fracture.  No midline tenderness C/T/L..  Clear TM bilaterally.  According to aide at bedside patient at baseline.  Labs and imaging not warranted this time.  Medically clear for discharge.  Gave strict return precautions.

## 2025-07-22 NOTE — ED PROVIDER NOTE - NSFOLLOWUPINSTRUCTIONS_ED_ALL_ED_FT
Please schedule follow up appointment with PCP within the week for further evaluation of symptoms.     Please take Tylenol up to 650 mg every 6 hours as needed for pain and/or Motrin up to 600 mg every 8 hours as needed for pain    Please take any prescribed medications as instructed by your PMD    Concussion, Adult  A concussion is a brain injury from a direct hit (blow) to the head or body. This blow causes the brain to shake quickly back and forth inside the skull. This can damage brain cells and cause chemical changes in the brain. A concussion may also be known as a mild traumatic brain injury (TBI).    Concussions are usually not life-threatening, but the effects of a concussion can be serious. If you have a concussion, you are more likely to experience concussion-like symptoms after a direct blow to the head in the future.    What are the causes?  This condition is caused by:    A direct blow to the head, such as from running into another player during a game, being hit in a fight, or hitting your head on a hard surface.  A jolt of the head or neck that causes the brain to move back and forth inside the skull, such as in a car crash.    What are the signs or symptoms?  The signs of a concussion can be hard to notice. Early on, they may be missed by you, family members, and health care providers. You may look fine but act or feel differently.    Symptoms are usually temporary, but they may last for days, weeks, or even longer. Some symptoms may appear right away but other symptoms may not show up for hours or days. Every head injury is different. Symptoms may include:    Headaches. This can include a feeling of pressure in the head.  Memory problems.  Trouble concentrating, organizing, or making decisions.  Slowness in thinking, acting or reacting, speaking, or reading.  Confusion.  Fatigue.  Changes in eating or sleeping patterns.  Problems with coordination or balance.  Nausea or vomiting.  Numbness or tingling.  Sensitivity to light or noise.  Vision or hearing problems.  Reduced sense of smell.  Irritability or mood changes.  Dizziness.  Lack of motivation.  Seeing or hearing things that other people do not see or hear (hallucinations).    How is this diagnosed?  This condition is diagnosed based on:    Your symptoms.  A description of your injury.    You may also have tests, including:    Imaging tests, such as a CT scan or MRI. These are done to look for signs of brain injury.  Neuropsychological tests. These measure your thinking, understanding, learning, and remembering abilities.    How is this treated?  This condition is treated with physical and mental rest and careful observation, usually at home. If the concussion is severe, you may need to stay home from work for a while. You may be referred to a concussion clinic or to other health care providers for management. It is important that you tell your health care provider if:    You are taking any medicines, including prescription medicines, over-the-counter medicines, and natural remedies. Some medicines, such as blood thinners (anticoagulants) and aspirin, may increase the chance of complications, such as bleeding.  You are taking or have taken alcohol or illegal drugs. Alcohol and certain other drugs may slow your recovery and can put you at risk of further injury.    How fast you will recover from a concussion depends on many factors, such as how severe your concussion is, what part of your brain was injured, how old you are, and how healthy you were before the concussion. Recovery can take time. It is important to wait to return to activity until a health care provider says it is safe to do that and your symptoms are completely gone.    Follow these instructions at home:  Activity     Limit activities that require a lot of thought or concentration. These may include:    Doing homework or job-related work.  Watching TV.  Working on the computer.  Playing memory games and puzzles.    Rest. Rest helps the brain to heal. Make sure you:    Get plenty of sleep at night. Avoid staying up late at night.  Keep the same bedtime hours on weekends and weekdays.  Rest during the day. Take naps or rest breaks when you feel tired.    Having another concussion before the first one has healed can be dangerous. Do not do high-risk activities that could cause a second concussion, such as riding a bicycle or playing sports.  Ask your health care provider when you can return to your normal activities, such as school, work, athletics, driving, riding a bicycle, or using heavy machinery. Your ability to react may be slower after a brain injury. Never do these activities if you are dizzy. Your health care provider will likely give you a plan for gradually returning to activities.  General instructions     Take over-the-counter and prescription medicines only as told by your health care provider.  Do not drink alcohol until your health care provider says you can.  If it is harder than usual to remember things, write them down.  If you are easily distracted, try to do one thing at a time. For example, do not try to watch TV while fixing dinner.  Talk with family members or close friends when making important decisions.  Watch your symptoms and tell others to do the same. Complications sometimes occur after a concussion. Older adults with a brain injury may have a higher risk of serious complications, such as a blood clot in the brain.  Tell your teachers, school nurse, school counselor, , , or  about your injury, symptoms, and restrictions. Tell them about what you can or cannot do. They should watch for:    Increased problems with attention or concentration.  Increased difficulty remembering or learning new information.  Increased time needed to complete tasks or assignments.  Increased irritability or decreased ability to cope with stress.  Increased symptoms.    Keep all follow-up visits as told by your health care provider. This is important.  How is this prevented?  It is very important to avoid another brain injury, especially as you recover. In rare cases, another injury can lead to permanent brain damage, brain swelling, or death. The risk of this is greatest during the first 7–10 days after a head injury. Avoid injuries by:    Wearing a seat belt when riding in a car.  Wearing a helmet when biking, skiing, skateboarding, skating, or doing similar activities.  Avoiding activities that could lead to a second concussion, such as contact or recreational sports, until your health care provider says it is okay.  Taking safety measures in your home, such as:    Removing clutter and tripping hazards from floors and stairways.  Using grab bars in bathrooms and handrails by stairs.  Placing non-slip mats on floors and in bathtubs.  Improving lighting in dim areas.      Contact a health care provider if:  Your symptoms get worse.  You have new symptoms.  You continue to have symptoms for more than 2 weeks.  Get help right away if:  You have severe or worsening headaches.  You have weakness or numbness in any part of your body.  Your coordination gets worse.  You vomit repeatedly.  You are sleepier.  The pupil of one eye is larger than the other.  You have convulsions or a seizure.  Your speech is slurred.  Your fatigue, confusion, or irritability gets worse.  You cannot recognize people or places.  You have neck pain.  It is difficult to wake you up.  You have unusual behavior changes.  You lose consciousness.  Summary  A concussion is a brain injury from a direct hit (blow) to the head or body.  A concussion may also be called a mild traumatic brain injury (TBI).  You may have imaging tests and neuropsychological tests to diagnose a concussion.  This condition is treated with physical and mental rest and careful observation.  Ask your health care provider when you can return to your normal activities, such as school, work, athletics, driving, riding a bicycle, or using heavy machinery. Follow safety instructions as told by your health care provider.

## 2025-07-22 NOTE — ED PROVIDER NOTE - PHYSICAL EXAMINATION
Gen: NAD, non-toxic appearing  Head: normal appearing  HEENT: normal conjunctiva, oral mucosa moist, normal EOM, PERRLA.  No focal deficits.  No signs of skull fracture.  No midline tenderness C/T/L..  Clear TM bilaterally.    Lung: no respiratory distress, CTA b/l     CV: regular rate and rhythm, no murmurs  Abd: soft, non distended, non tender   MSK: no visible deformities  Neuro: No focal deficits

## 2025-07-22 NOTE — ED PROVIDER NOTE - PROGRESS NOTE DETAILS
Dameon Bae DO (PGY3)  VSS. Time was taken to answer all of patients questions and concerns. Return precaution instructions were given and patient understands and feels comfortable with disposition.

## 2025-07-22 NOTE — ED PROVIDER NOTE - PATIENT PORTAL LINK FT
You can access the FollowMyHealth Patient Portal offered by [facility] by registering at the following website: [email or web address]. By joining [employer]’s FollowMyHealth portal, you will also be able to view your health information using other applications (apps) compatible with our system.

## 2025-07-28 ENCOUNTER — EMERGENCY (EMERGENCY)
Facility: HOSPITAL | Age: 35
LOS: 1 days | End: 2025-07-28
Attending: STUDENT IN AN ORGANIZED HEALTH CARE EDUCATION/TRAINING PROGRAM
Payer: MEDICAID

## 2025-07-28 VITALS
OXYGEN SATURATION: 97 % | HEART RATE: 87 BPM | DIASTOLIC BLOOD PRESSURE: 83 MMHG | SYSTOLIC BLOOD PRESSURE: 116 MMHG | HEIGHT: 65 IN | RESPIRATION RATE: 18 BRPM | WEIGHT: 166.01 LBS | TEMPERATURE: 98 F

## 2025-07-28 DIAGNOSIS — Z98.811 DENTAL RESTORATION STATUS: Chronic | ICD-10-CM

## 2025-07-28 PROCEDURE — 99283 EMERGENCY DEPT VISIT LOW MDM: CPT

## 2025-07-28 PROCEDURE — 99282 EMERGENCY DEPT VISIT SF MDM: CPT

## 2025-07-28 NOTE — ED PROVIDER NOTE - HIV OFFER
Previously Declined (within the last year) Orbicularis Oris Muscle Flap Text: The defect edges were debeveled with a #15 scalpel blade.  Given that the defect affected the competency of the oral sphincter an obicularis oris muscle flap was deemed most appropriate to restore this competency and normal muscle function.  Using a sterile surgical marker, an appropriate flap was drawn incorporating the defect. The area thus outlined was incised with a #15 scalpel blade.

## 2025-07-28 NOTE — ED PROVIDER NOTE - OBJECTIVE STATEMENT
35-year-old male PMH autism hypothyroidism seizure disorder intellectual delay presenting with self-harm.  Aide at bedside states patient struck his head on a windowsill x 1 no LOC patient is at baseline mental status.  Aide denies any nausea vomiting any weakness any focal deficits since the event.  Patient unable to participate in encounter and unwilling

## 2025-07-28 NOTE — ED PROVIDER NOTE - PHYSICAL EXAMINATION
GENERAL: NAD, lying in bed comfortably  HEAD:  Hematoma forehead, normocephalic  EYES: EOMI, PERRLA, conjunctiva and sclera clear  NECK: Supple, trachea midline, no JVD  HEART: Regular rate and rhythm, no murmurs, rubs, or gallops  LUNGS: Unlabored respirations.  Clear to auscultation bilaterally, no crackles, wheezing, or rhonchi  ABDOMEN: Soft, nontender, nondistended, +BS  MSK: no midline tenderness, moving all extremities   EXTREMITIES: 2+ peripheral pulses bilaterally. No clubbing, cyanosis, or edema  NERVOUS SYSTEM:  A&Ox3, moving all extremities, no focal deficits, coordination intact, gait intact, 5/5 muscle strength b/l  SKIN: No rashes or lesions

## 2025-07-28 NOTE — ED ADULT NURSE NOTE - OBJECTIVE STATEMENT
35y male w/ pmh of autism presents with aggressive behavior. Pt accompanied by two staff members from group [facility]. Staff members state pt hit his head against window due to agitation and as per protocol, must bring him to hospital for evaluation. Pt is acting at baseline mental status as per staff, does not take anticoagulants and has no signs of trauma. Pt is moving all extremities and is ambulatory.

## 2025-07-28 NOTE — ED PROVIDER NOTE - ATTENDING CONTRIBUTION TO CARE
I, Dustin Garcia, have performed a history and physical exam on this patient, and discussed their management with the resident. I have fully participated in the care of this patient. I agree with the history, physical exam, and plan as documented by the resident, unless reported as otherwise below:    34 yo M hx severe intellectual disability/autism, hypothyroidism, presenting to ED for head injury occurring at 6 Am today. Hit midline forehead on window sill. Group home staff report hx of behavioral disturbances w/ self injury in past - state incident this AM was consistent with this hx. No LOC. No seizure activity. Immediately back to baseline for pt per staff. No vomiting. No difficulty walking. Not on blood thinners/anti platelets. On exam pt ambulatory, NAD, compliant with examination. Following commands. Pupils ERRL, 3 mm bilaterally. Moving extremities x 4 w/o pain/weakness - strength intact throughout. No midline C spine ttp. Examination of face w/ midline hematoma, minimally ttp over hematoma, surrounding scalp non ttp. No open wound. Facial bones stable. Overall mechanism low risk for ICH, low concern for TBI. Appears to be neurologically at baseline. No red flags such as weakness or vomiting. No other findings of traumatic injury throughout remainder of extremity and torso exam. VSS. Tentative plan for period of obs. Will reassess for stability, PO tolerance. Likely plan for DC to group home with staff w/ return precautions if pt remains well appearing without changes in neurological exam.     Please refer to progress notes/disposition for additional decision making in patient's care.

## 2025-07-28 NOTE — ED PROVIDER NOTE - NSFOLLOWUPINSTRUCTIONS_ED_ALL_ED_FT
You were seen in the emergency department for head injury you are observed in the emergency department and with no neurologic deficits were seen.  Y your physical exam was normal.  Ou were able to tolerate oral intake.  We would like for you to follow-up with your primary care provider    Please follow up with your doctor within 1 week. Bring copies of your results with you (provided in your discharge paperwork).     You may take 500-1000 mg acetaminophen (tylenol) every 6 hours, as needed for pain.  You may take 600 mg ibuprofen every 8 hours, with food, as needed for pain.  You can take tylenol and ibuprofen at the same time.     PLEASE RETURN TO ED FOR NEW OR WORSENING SYMPTOMS (intractable nausea/vomiting, severe bleeding, fever over 100.4F, loss of movement of one or more limbs, seizure)

## 2025-07-28 NOTE — ED ADULT NURSE NOTE - NSFALLUNIVINTERV_ED_ALL_ED
Bed/Stretcher in lowest position, wheels locked, appropriate side rails in place/Call bell, personal items and telephone in reach/Instruct patient to call for assistance before getting out of bed/chair/stretcher/Non-slip footwear applied when patient is off stretcher/[place] to call system/Physically safe environment - no spills, clutter or unnecessary equipment/Purposeful proactive rounding/Room/bathroom lighting operational, light cord in reach

## 2025-07-28 NOTE — ED PROVIDER NOTE - PATIENT PORTAL LINK FT
You can access the FollowMyHealth Patient Portal offered by [facility] by registering at the following website: [email or web address]. By joining [facility]’s FollowMyHealth portal, you will also be able to view your health information using other applications (apps) compatible with our system.

## 2025-07-28 NOTE — ED PROVIDER NOTE - CLINICAL SUMMARY MEDICAL DECISION MAKING FREE TEXT BOX
Patient 5 out of 5 muscle strength able to ambulate able to express his needs with normal pupil sizes symmetrical bilaterally no concern for intracranial bleed due to location of injury on forehead and lack of neurologic symptoms.

## 2025-08-02 ENCOUNTER — EMERGENCY (EMERGENCY)
Facility: HOSPITAL | Age: 35
LOS: 1 days | End: 2025-08-02
Admitting: EMERGENCY MEDICINE
Payer: MEDICAID

## 2025-08-02 VITALS
WEIGHT: 160.06 LBS | RESPIRATION RATE: 16 BRPM | SYSTOLIC BLOOD PRESSURE: 129 MMHG | TEMPERATURE: 98 F | OXYGEN SATURATION: 96 % | HEIGHT: 65 IN | DIASTOLIC BLOOD PRESSURE: 72 MMHG | HEART RATE: 71 BPM

## 2025-08-02 DIAGNOSIS — Z98.811 DENTAL RESTORATION STATUS: Chronic | ICD-10-CM

## 2025-08-02 PROCEDURE — 99283 EMERGENCY DEPT VISIT LOW MDM: CPT

## 2025-08-03 NOTE — PROGRESS NOTE ADULT - PROBLEM/PLAN-5
Noted  - BP stable at present  
DISPLAY PLAN FREE TEXT

## 2025-08-13 ENCOUNTER — APPOINTMENT (OUTPATIENT)
Dept: ORTHOPEDIC SURGERY | Facility: HOSPITAL | Age: 35
End: 2025-08-13

## 2025-08-13 ENCOUNTER — OUTPATIENT (OUTPATIENT)
Dept: OUTPATIENT SERVICES | Facility: HOSPITAL | Age: 35
LOS: 1 days | End: 2025-08-13
Payer: MEDICAID

## 2025-08-13 VITALS
OXYGEN SATURATION: 97 % | BODY MASS INDEX: 28.66 KG/M2 | HEART RATE: 87 BPM | DIASTOLIC BLOOD PRESSURE: 79 MMHG | RESPIRATION RATE: 18 BRPM | WEIGHT: 172 LBS | TEMPERATURE: 98 F | SYSTOLIC BLOOD PRESSURE: 119 MMHG | HEIGHT: 65 IN

## 2025-08-13 DIAGNOSIS — M79.609 PAIN IN UNSPECIFIED LIMB: ICD-10-CM

## 2025-08-13 DIAGNOSIS — M25.551 PAIN IN RIGHT HIP: ICD-10-CM

## 2025-08-13 DIAGNOSIS — Z98.811 DENTAL RESTORATION STATUS: Chronic | ICD-10-CM

## 2025-08-13 PROCEDURE — G0463: CPT

## 2025-08-14 DIAGNOSIS — M25.551 PAIN IN RIGHT HIP: ICD-10-CM

## 2025-08-18 ENCOUNTER — EMERGENCY (EMERGENCY)
Facility: HOSPITAL | Age: 35
LOS: 1 days | End: 2025-08-18
Attending: STUDENT IN AN ORGANIZED HEALTH CARE EDUCATION/TRAINING PROGRAM | Admitting: STUDENT IN AN ORGANIZED HEALTH CARE EDUCATION/TRAINING PROGRAM
Payer: MEDICAID

## 2025-08-18 ENCOUNTER — EMERGENCY (EMERGENCY)
Facility: HOSPITAL | Age: 35
LOS: 1 days | End: 2025-08-18
Attending: EMERGENCY MEDICINE
Payer: MEDICAID

## 2025-08-18 VITALS
HEIGHT: 65 IN | SYSTOLIC BLOOD PRESSURE: 135 MMHG | OXYGEN SATURATION: 98 % | DIASTOLIC BLOOD PRESSURE: 69 MMHG | RESPIRATION RATE: 16 BRPM | TEMPERATURE: 98 F | HEART RATE: 85 BPM | WEIGHT: 119.93 LBS

## 2025-08-18 VITALS
HEART RATE: 80 BPM | RESPIRATION RATE: 17 BRPM | DIASTOLIC BLOOD PRESSURE: 73 MMHG | WEIGHT: 154.98 LBS | TEMPERATURE: 97 F | SYSTOLIC BLOOD PRESSURE: 110 MMHG | HEIGHT: 65 IN | OXYGEN SATURATION: 95 %

## 2025-08-18 VITALS
TEMPERATURE: 98 F | HEART RATE: 73 BPM | OXYGEN SATURATION: 100 % | SYSTOLIC BLOOD PRESSURE: 117 MMHG | DIASTOLIC BLOOD PRESSURE: 71 MMHG | RESPIRATION RATE: 17 BRPM

## 2025-08-18 VITALS
OXYGEN SATURATION: 96 % | RESPIRATION RATE: 18 BRPM | DIASTOLIC BLOOD PRESSURE: 74 MMHG | SYSTOLIC BLOOD PRESSURE: 115 MMHG | HEART RATE: 74 BPM

## 2025-08-18 DIAGNOSIS — Z98.811 DENTAL RESTORATION STATUS: Chronic | ICD-10-CM

## 2025-08-18 LAB
ALBUMIN SERPL ELPH-MCNC: 3.8 G/DL — SIGNIFICANT CHANGE UP (ref 3.3–5)
ALP SERPL-CCNC: 90 U/L — SIGNIFICANT CHANGE UP (ref 40–120)
ALT FLD-CCNC: 23 U/L — SIGNIFICANT CHANGE UP (ref 10–45)
ANION GAP SERPL CALC-SCNC: 12 MMOL/L — SIGNIFICANT CHANGE UP (ref 5–17)
AST SERPL-CCNC: 25 U/L — SIGNIFICANT CHANGE UP (ref 10–40)
BASOPHILS # BLD AUTO: 0.05 K/UL — SIGNIFICANT CHANGE UP (ref 0–0.2)
BASOPHILS NFR BLD AUTO: 0.8 % — SIGNIFICANT CHANGE UP (ref 0–2)
BILIRUB SERPL-MCNC: 0.3 MG/DL — SIGNIFICANT CHANGE UP (ref 0.2–1.2)
BUN SERPL-MCNC: 20 MG/DL — SIGNIFICANT CHANGE UP (ref 7–23)
CALCIUM SERPL-MCNC: 9.1 MG/DL — SIGNIFICANT CHANGE UP (ref 8.4–10.5)
CHLORIDE SERPL-SCNC: 107 MMOL/L — SIGNIFICANT CHANGE UP (ref 96–108)
CO2 SERPL-SCNC: 21 MMOL/L — LOW (ref 22–31)
CREAT SERPL-MCNC: 0.78 MG/DL — SIGNIFICANT CHANGE UP (ref 0.5–1.3)
EGFR: 119 ML/MIN/1.73M2 — SIGNIFICANT CHANGE UP
EGFR: 119 ML/MIN/1.73M2 — SIGNIFICANT CHANGE UP
EOSINOPHIL # BLD AUTO: 0.14 K/UL — SIGNIFICANT CHANGE UP (ref 0–0.5)
EOSINOPHIL NFR BLD AUTO: 2.2 % — SIGNIFICANT CHANGE UP (ref 0–6)
GLUCOSE SERPL-MCNC: 84 MG/DL — SIGNIFICANT CHANGE UP (ref 70–99)
HBV SURFACE AG SER-ACNC: SIGNIFICANT CHANGE UP
HCT VFR BLD CALC: 39.2 % — SIGNIFICANT CHANGE UP (ref 39–50)
HGB BLD-MCNC: 12.9 G/DL — LOW (ref 13–17)
HIV 1 & 2 AB SERPL IA.RAPID: SIGNIFICANT CHANGE UP
IMM GRANULOCYTES # BLD AUTO: 0.02 K/UL — SIGNIFICANT CHANGE UP (ref 0–0.07)
IMM GRANULOCYTES NFR BLD AUTO: 0.3 % — SIGNIFICANT CHANGE UP (ref 0–0.9)
LYMPHOCYTES # BLD AUTO: 1.87 K/UL — SIGNIFICANT CHANGE UP (ref 1–3.3)
LYMPHOCYTES NFR BLD AUTO: 29.9 % — SIGNIFICANT CHANGE UP (ref 13–44)
MCHC RBC-ENTMCNC: 32.7 PG — SIGNIFICANT CHANGE UP (ref 27–34)
MCHC RBC-ENTMCNC: 32.9 G/DL — SIGNIFICANT CHANGE UP (ref 32–36)
MCV RBC AUTO: 99.2 FL — SIGNIFICANT CHANGE UP (ref 80–100)
MONOCYTES # BLD AUTO: 0.61 K/UL — SIGNIFICANT CHANGE UP (ref 0–0.9)
MONOCYTES NFR BLD AUTO: 9.8 % — SIGNIFICANT CHANGE UP (ref 2–14)
NEUTROPHILS # BLD AUTO: 3.56 K/UL — SIGNIFICANT CHANGE UP (ref 1.8–7.4)
NEUTROPHILS NFR BLD AUTO: 57 % — SIGNIFICANT CHANGE UP (ref 43–77)
NRBC # BLD AUTO: 0 K/UL — SIGNIFICANT CHANGE UP (ref 0–0)
NRBC # FLD: 0 K/UL — SIGNIFICANT CHANGE UP (ref 0–0)
NRBC BLD AUTO-RTO: 0 /100 WBCS — SIGNIFICANT CHANGE UP (ref 0–0)
PLATELET # BLD AUTO: 157 K/UL — SIGNIFICANT CHANGE UP (ref 150–400)
PMV BLD: 11.7 FL — SIGNIFICANT CHANGE UP (ref 7–13)
POTASSIUM SERPL-MCNC: 4.6 MMOL/L — SIGNIFICANT CHANGE UP (ref 3.5–5.3)
POTASSIUM SERPL-SCNC: 4.6 MMOL/L — SIGNIFICANT CHANGE UP (ref 3.5–5.3)
PROT SERPL-MCNC: 6.2 G/DL — SIGNIFICANT CHANGE UP (ref 6–8.3)
RBC # BLD: 3.95 M/UL — LOW (ref 4.2–5.8)
RBC # FLD: 12.9 % — SIGNIFICANT CHANGE UP (ref 10.3–14.5)
SODIUM SERPL-SCNC: 140 MMOL/L — SIGNIFICANT CHANGE UP (ref 135–145)
WBC # BLD: 6.25 K/UL — SIGNIFICANT CHANGE UP (ref 3.8–10.5)
WBC # FLD AUTO: 6.25 K/UL — SIGNIFICANT CHANGE UP (ref 3.8–10.5)

## 2025-08-18 PROCEDURE — 80053 COMPREHEN METABOLIC PANEL: CPT

## 2025-08-18 PROCEDURE — 99284 EMERGENCY DEPT VISIT MOD MDM: CPT

## 2025-08-18 PROCEDURE — 80074 ACUTE HEPATITIS PANEL: CPT

## 2025-08-18 PROCEDURE — 99283 EMERGENCY DEPT VISIT LOW MDM: CPT

## 2025-08-18 PROCEDURE — 85025 COMPLETE CBC W/AUTO DIFF WBC: CPT

## 2025-08-18 PROCEDURE — 87340 HEPATITIS B SURFACE AG IA: CPT

## 2025-08-18 PROCEDURE — 86706 HEP B SURFACE ANTIBODY: CPT

## 2025-08-18 PROCEDURE — 86780 TREPONEMA PALLIDUM: CPT

## 2025-08-18 PROCEDURE — 86703 HIV-1/HIV-2 1 RESULT ANTBDY: CPT

## 2025-08-19 LAB
HAV IGM SER-ACNC: SIGNIFICANT CHANGE UP
HBV CORE IGM SER-ACNC: SIGNIFICANT CHANGE UP
HBV SURFACE AB SER-ACNC: REACTIVE — SIGNIFICANT CHANGE UP
HBV SURFACE AG SER-ACNC: SIGNIFICANT CHANGE UP
HCV AB S/CO SERPL IA: 0.06 S/CO — SIGNIFICANT CHANGE UP (ref 0–0.79)
HCV AB SERPL-IMP: SIGNIFICANT CHANGE UP
T PALLIDUM AB TITR SER: NEGATIVE — SIGNIFICANT CHANGE UP

## (undated) DEVICE — POSITIONER FOAM EGG CRATE ULNAR 2PCS (PINK)

## (undated) DEVICE — SPONGE END-STRUNG CYLINDRICAL .5X1.5"

## (undated) DEVICE — DRAPE TOWEL BLUE 17" X 24"

## (undated) DEVICE — DRAPE INSTRUMENT POUCH 6.75" X 11"

## (undated) DEVICE — ELCTR BOVIE TIP NEEDLE INSULATED 2.8" EDGE

## (undated) DEVICE — VENODYNE/SCD SLEEVE CALF MEDIUM

## (undated) DEVICE — ELCTR BOVIE PENCIL SMOKE EVACUATION

## (undated) DEVICE — MEDICATION LABELS W MARKER

## (undated) DEVICE — GLV 6 PROTEXIS (BLUE)

## (undated) DEVICE — SUT CHROMIC 3-0 30" C-13

## (undated) DEVICE — SOL IRR POUR H2O 250ML

## (undated) DEVICE — PACK DENTAL

## (undated) DEVICE — WARMING BLANKET LOWER ADULT

## (undated) DEVICE — DRAPE MAGNETIC INSTRUMENT MEDIUM

## (undated) DEVICE — DRAPE SURGICAL #1010

## (undated) DEVICE — ELCTR BOVIE PENCIL HANDPIECE

## (undated) DEVICE — SOL IRR POUR NS 0.9% 500ML